# Patient Record
Sex: MALE | Race: WHITE | NOT HISPANIC OR LATINO | Employment: OTHER | URBAN - METROPOLITAN AREA
[De-identification: names, ages, dates, MRNs, and addresses within clinical notes are randomized per-mention and may not be internally consistent; named-entity substitution may affect disease eponyms.]

---

## 2023-08-08 DIAGNOSIS — I25.10 CORONARY ARTERY DISEASE INVOLVING NATIVE CORONARY ARTERY OF NATIVE HEART, UNSPECIFIED WHETHER ANGINA PRESENT: Primary | ICD-10-CM

## 2023-08-09 ENCOUNTER — HOSPITAL ENCOUNTER (INPATIENT)
Facility: HOSPITAL | Age: 73
LOS: 7 days | Discharge: NURSING, REHAB OR LONG-TERM CARE | DRG: 236 | End: 2023-08-17
Attending: THORACIC SURGERY (CARDIOTHORACIC VASCULAR SURGERY) | Admitting: THORACIC SURGERY (CARDIOTHORACIC VASCULAR SURGERY)
Payer: OTHER GOVERNMENT

## 2023-08-09 ENCOUNTER — HOSPITAL ENCOUNTER (OUTPATIENT)
Dept: CARDIOLOGY | Facility: HOSPITAL | Age: 73
Discharge: HOME | End: 2023-08-09
Payer: MEDICARE

## 2023-08-09 DIAGNOSIS — Z00.6 ENCOUNTER FOR EXAMINATION FOR NORMAL COMPARISON AND CONTROL IN CLINICAL RESEARCH PROGRAM: ICD-10-CM

## 2023-08-09 DIAGNOSIS — I25.118 CORONARY ARTERY DISEASE OF NATIVE ARTERY OF NATIVE HEART WITH STABLE ANGINA PECTORIS: Primary | ICD-10-CM

## 2023-08-10 ENCOUNTER — APPOINTMENT (OUTPATIENT)
Dept: CARDIOLOGY | Facility: HOSPITAL | Age: 73
DRG: 236 | End: 2023-08-10
Payer: OTHER GOVERNMENT

## 2023-08-10 ENCOUNTER — APPOINTMENT (OUTPATIENT)
Dept: GENERAL RADIOLOGY | Facility: HOSPITAL | Age: 73
DRG: 236 | End: 2023-08-10
Payer: OTHER GOVERNMENT

## 2023-08-10 ENCOUNTER — APPOINTMENT (OUTPATIENT)
Dept: PULMONOLOGY | Facility: HOSPITAL | Age: 73
DRG: 236 | End: 2023-08-10
Payer: OTHER GOVERNMENT

## 2023-08-10 PROBLEM — I25.10 CORONARY ARTERY DISEASE: Status: ACTIVE | Noted: 2023-08-10

## 2023-08-10 LAB
ABO GROUP BLD: NORMAL
ABO GROUP BLD: NORMAL
ALBUMIN SERPL-MCNC: 4.1 G/DL (ref 3.5–5.2)
ALBUMIN/GLOB SERPL: 1.6 G/DL
ALP SERPL-CCNC: 58 U/L (ref 39–117)
ALT SERPL W P-5'-P-CCNC: 40 U/L (ref 1–41)
AMPHET+METHAMPHET UR QL: NEGATIVE
AMPHETAMINES UR QL: NEGATIVE
ANION GAP SERPL CALCULATED.3IONS-SCNC: 9 MMOL/L (ref 5–15)
APTT PPP: 44.2 SECONDS (ref 60–90)
ASCENDING AORTA: 3 CM
AST SERPL-CCNC: 27 U/L (ref 1–40)
BARBITURATES UR QL SCN: NEGATIVE
BASOPHILS # BLD AUTO: 0.06 10*3/MM3 (ref 0–0.2)
BASOPHILS NFR BLD AUTO: 0.8 % (ref 0–1.5)
BENZODIAZ UR QL SCN: NEGATIVE
BH CV ECHO MEAS - AO MAX PG: 5.2 MMHG
BH CV ECHO MEAS - AO MEAN PG: 2.6 MMHG
BH CV ECHO MEAS - AO ROOT DIAM: 3.3 CM
BH CV ECHO MEAS - AO V2 MAX: 114.5 CM/SEC
BH CV ECHO MEAS - AO V2 VTI: 25.4 CM
BH CV ECHO MEAS - AVA(I,D): 3.2 CM2
BH CV ECHO MEAS - EDV(CUBED): 103.5 ML
BH CV ECHO MEAS - EDV(MOD-SP2): 43.8 ML
BH CV ECHO MEAS - EDV(MOD-SP4): 31.9 ML
BH CV ECHO MEAS - EF(MOD-BP): 62 %
BH CV ECHO MEAS - EF(MOD-SP2): 64 %
BH CV ECHO MEAS - EF(MOD-SP4): 64.8 %
BH CV ECHO MEAS - ESV(CUBED): 52.9 ML
BH CV ECHO MEAS - ESV(MOD-SP2): 15.8 ML
BH CV ECHO MEAS - ESV(MOD-SP4): 11.2 ML
BH CV ECHO MEAS - FS: 20 %
BH CV ECHO MEAS - IVS/LVPW: 1.06 CM
BH CV ECHO MEAS - IVSD: 1.15 CM
BH CV ECHO MEAS - LA DIMENSION: 3.9 CM
BH CV ECHO MEAS - LAT PEAK E' VEL: 10 CM/SEC
BH CV ECHO MEAS - LV DIASTOLIC VOL/BSA (35-75): 15.2 CM2
BH CV ECHO MEAS - LV MASS(C)D: 190.8 GRAMS
BH CV ECHO MEAS - LV MAX PG: 4.2 MMHG
BH CV ECHO MEAS - LV MEAN PG: 2.38 MMHG
BH CV ECHO MEAS - LV SYSTOLIC VOL/BSA (12-30): 5.3 CM2
BH CV ECHO MEAS - LV V1 MAX: 102.8 CM/SEC
BH CV ECHO MEAS - LV V1 VTI: 23 CM
BH CV ECHO MEAS - LVIDD: 4.7 CM
BH CV ECHO MEAS - LVIDS: 3.8 CM
BH CV ECHO MEAS - LVOT AREA: 3.5 CM2
BH CV ECHO MEAS - LVOT DIAM: 2.12 CM
BH CV ECHO MEAS - LVPWD: 1.08 CM
BH CV ECHO MEAS - MED PEAK E' VEL: 5 CM/SEC
BH CV ECHO MEAS - MV A MAX VEL: 47.6 CM/SEC
BH CV ECHO MEAS - MV DEC SLOPE: 178.4 CM/SEC2
BH CV ECHO MEAS - MV DEC TIME: 0.25 MSEC
BH CV ECHO MEAS - MV E MAX VEL: 44.9 CM/SEC
BH CV ECHO MEAS - MV E/A: 0.94
BH CV ECHO MEAS - MV MAX PG: 2.26 MMHG
BH CV ECHO MEAS - MV MEAN PG: 0.85 MMHG
BH CV ECHO MEAS - MV P1/2T: 55 MSEC
BH CV ECHO MEAS - MV V2 VTI: 21 CM
BH CV ECHO MEAS - MVA(VTI): 3.9 CM2
BH CV ECHO MEAS - PA ACC TIME: 0.16 SEC
BH CV ECHO MEAS - PA V2 MAX: 97.6 CM/SEC
BH CV ECHO MEAS - RAP SYSTOLE: 3 MMHG
BH CV ECHO MEAS - RVSP: 18.2 MMHG
BH CV ECHO MEAS - SI(MOD-SP2): 13.4 ML/M2
BH CV ECHO MEAS - SI(MOD-SP4): 9.9 ML/M2
BH CV ECHO MEAS - SV(LVOT): 81.3 ML
BH CV ECHO MEAS - SV(MOD-SP2): 28 ML
BH CV ECHO MEAS - SV(MOD-SP4): 20.7 ML
BH CV ECHO MEAS - TAPSE (>1.6): 2.6 CM
BH CV ECHO MEAS - TR MAX PG: 15.2 MMHG
BH CV ECHO MEAS - TR MAX VEL: 194.8 CM/SEC
BH CV ECHO MEASUREMENTS AVERAGE E/E' RATIO: 5.99
BH CV VAS BP RIGHT ARM: NORMAL MMHG
BH CV XLRA - RV BASE: 3.7 CM
BH CV XLRA - RV LENGTH: 6.5 CM
BH CV XLRA - RV MID: 2.9 CM
BH CV XLRA - TDI S': 15 CM/SEC
BH CV XLRA MEAS LEFT DIST CCA EDV: 13.7 CM/SEC
BH CV XLRA MEAS LEFT DIST CCA PSV: 85.8 CM/SEC
BH CV XLRA MEAS LEFT DIST ICA EDV: 26 CM/SEC
BH CV XLRA MEAS LEFT DIST ICA PSV: 95 CM/SEC
BH CV XLRA MEAS LEFT ICA/CCA RATIO: 1.32
BH CV XLRA MEAS LEFT MID CCA EDV: 17.2 CM/SEC
BH CV XLRA MEAS LEFT MID CCA PSV: 103.3 CM/SEC
BH CV XLRA MEAS LEFT MID ICA EDV: 26 CM/SEC
BH CV XLRA MEAS LEFT MID ICA PSV: 115.3 CM/SEC
BH CV XLRA MEAS LEFT PROX CCA EDV: 16.5 CM/SEC
BH CV XLRA MEAS LEFT PROX CCA PSV: 111.4 CM/SEC
BH CV XLRA MEAS LEFT PROX ECA EDV: 9.7 CM/SEC
BH CV XLRA MEAS LEFT PROX ECA PSV: 168.1 CM/SEC
BH CV XLRA MEAS LEFT PROX ICA EDV: 37.7 CM/SEC
BH CV XLRA MEAS LEFT PROX ICA PSV: 136.2 CM/SEC
BH CV XLRA MEAS LEFT PROX SCLA EDV: 0 CM/SEC
BH CV XLRA MEAS LEFT PROX SCLA PSV: 203.4 CM/SEC
BH CV XLRA MEAS LEFT VERTEBRAL A EDV: 9 CM/SEC
BH CV XLRA MEAS LEFT VERTEBRAL A PSV: 34 CM/SEC
BH CV XLRA MEAS RIGHT DIST CCA EDV: 18.3 CM/SEC
BH CV XLRA MEAS RIGHT DIST CCA PSV: 96.4 CM/SEC
BH CV XLRA MEAS RIGHT DIST ICA EDV: 26 CM/SEC
BH CV XLRA MEAS RIGHT DIST ICA PSV: 65.3 CM/SEC
BH CV XLRA MEAS RIGHT ICA/CCA RATIO: 0.85
BH CV XLRA MEAS RIGHT MID CCA EDV: 16.6 CM/SEC
BH CV XLRA MEAS RIGHT MID CCA PSV: 96.3 CM/SEC
BH CV XLRA MEAS RIGHT MID ICA EDV: 20.3 CM/SEC
BH CV XLRA MEAS RIGHT MID ICA PSV: 59.7 CM/SEC
BH CV XLRA MEAS RIGHT PROX CCA EDV: 14.3 CM/SEC
BH CV XLRA MEAS RIGHT PROX CCA PSV: 119.8 CM/SEC
BH CV XLRA MEAS RIGHT PROX ECA EDV: 10.3 CM/SEC
BH CV XLRA MEAS RIGHT PROX ECA PSV: 141.3 CM/SEC
BH CV XLRA MEAS RIGHT PROX ICA EDV: 21.4 CM/SEC
BH CV XLRA MEAS RIGHT PROX ICA PSV: 82.3 CM/SEC
BH CV XLRA MEAS RIGHT PROX SCLA EDV: 0 CM/SEC
BH CV XLRA MEAS RIGHT PROX SCLA PSV: 212.7 CM/SEC
BH CV XLRA MEAS RIGHT VERTEBRAL A EDV: 16.2 CM/SEC
BH CV XLRA MEAS RIGHT VERTEBRAL A PSV: 43.6 CM/SEC
BILIRUB SERPL-MCNC: 0.3 MG/DL (ref 0–1.2)
BILIRUB UR QL STRIP: NEGATIVE
BLD GP AB SCN SERPL QL: NEGATIVE
BUN SERPL-MCNC: 16 MG/DL (ref 8–23)
BUN/CREAT SERPL: 16.3 (ref 7–25)
BUPRENORPHINE SERPL-MCNC: NEGATIVE NG/ML
CALCIUM SPEC-SCNC: 9.6 MG/DL (ref 8.6–10.5)
CANNABINOIDS SERPL QL: NEGATIVE
CHLORIDE SERPL-SCNC: 102 MMOL/L (ref 98–107)
CHOLEST SERPL-MCNC: 192 MG/DL (ref 0–200)
CLARITY UR: CLEAR
CO2 SERPL-SCNC: 29 MMOL/L (ref 22–29)
COCAINE UR QL: NEGATIVE
COLOR UR: YELLOW
CREAT SERPL-MCNC: 0.98 MG/DL (ref 0.76–1.27)
DEPRECATED RDW RBC AUTO: 41.4 FL (ref 37–54)
EGFRCR SERPLBLD CKD-EPI 2021: 81.9 ML/MIN/1.73
EOSINOPHIL # BLD AUTO: 0.25 10*3/MM3 (ref 0–0.4)
EOSINOPHIL NFR BLD AUTO: 3.4 % (ref 0.3–6.2)
ERYTHROCYTE [DISTWIDTH] IN BLOOD BY AUTOMATED COUNT: 12.6 % (ref 12.3–15.4)
FENTANYL UR-MCNC: NEGATIVE NG/ML
GLOBULIN UR ELPH-MCNC: 2.6 GM/DL
GLUCOSE BLDC GLUCOMTR-MCNC: 119 MG/DL (ref 70–130)
GLUCOSE BLDC GLUCOMTR-MCNC: 146 MG/DL (ref 70–130)
GLUCOSE BLDC GLUCOMTR-MCNC: 162 MG/DL (ref 70–130)
GLUCOSE BLDC GLUCOMTR-MCNC: 172 MG/DL (ref 70–130)
GLUCOSE BLDC GLUCOMTR-MCNC: 183 MG/DL (ref 70–130)
GLUCOSE SERPL-MCNC: 171 MG/DL (ref 65–99)
GLUCOSE UR STRIP-MCNC: NEGATIVE MG/DL
HBA1C MFR BLD: 7.1 % (ref 4.8–5.6)
HCT VFR BLD AUTO: 39.7 % (ref 37.5–51)
HDLC SERPL-MCNC: 29 MG/DL (ref 40–60)
HGB BLD-MCNC: 14.1 G/DL (ref 13–17.7)
HGB UR QL STRIP.AUTO: NEGATIVE
IMM GRANULOCYTES # BLD AUTO: 0.04 10*3/MM3 (ref 0–0.05)
IMM GRANULOCYTES NFR BLD AUTO: 0.5 % (ref 0–0.5)
INR PPP: 1.06 (ref 0.89–1.12)
IVRT: 101 MSEC
KETONES UR QL STRIP: NEGATIVE
LDLC SERPL CALC-MCNC: 86 MG/DL (ref 0–100)
LDLC/HDLC SERPL: 2.35 {RATIO}
LEFT ATRIUM VOLUME INDEX: 18.8 ML/M2
LEUKOCYTE ESTERASE UR QL STRIP.AUTO: NEGATIVE
LV EF 2D ECHO EST: 70 %
LYMPHOCYTES # BLD AUTO: 2.45 10*3/MM3 (ref 0.7–3.1)
LYMPHOCYTES NFR BLD AUTO: 33.3 % (ref 19.6–45.3)
MCH RBC QN AUTO: 32.2 PG (ref 26.6–33)
MCHC RBC AUTO-ENTMCNC: 35.5 G/DL (ref 31.5–35.7)
MCV RBC AUTO: 90.6 FL (ref 79–97)
METHADONE UR QL SCN: NEGATIVE
MONOCYTES # BLD AUTO: 0.42 10*3/MM3 (ref 0.1–0.9)
MONOCYTES NFR BLD AUTO: 5.7 % (ref 5–12)
NEUTROPHILS NFR BLD AUTO: 4.13 10*3/MM3 (ref 1.7–7)
NEUTROPHILS NFR BLD AUTO: 56.3 % (ref 42.7–76)
NITRITE UR QL STRIP: NEGATIVE
NRBC BLD AUTO-RTO: 0 /100 WBC (ref 0–0.2)
OPIATES UR QL: POSITIVE
OXYCODONE UR QL SCN: NEGATIVE
PA ADP PRP-ACNC: 227 PRU
PCP UR QL SCN: NEGATIVE
PH UR STRIP.AUTO: 6 [PH] (ref 5–8)
PLATELET # BLD AUTO: 184 10*3/MM3 (ref 140–450)
PMV BLD AUTO: 9.7 FL (ref 6–12)
POTASSIUM SERPL-SCNC: 4.1 MMOL/L (ref 3.5–5.2)
PROPOXYPH UR QL: NEGATIVE
PROT SERPL-MCNC: 6.7 G/DL (ref 6–8.5)
PROT UR QL STRIP: NEGATIVE
PROTHROMBIN TIME: 13.9 SECONDS (ref 12.2–14.5)
QT INTERVAL: 454 MS
QTC INTERVAL: 426 MS
RBC # BLD AUTO: 4.38 10*6/MM3 (ref 4.14–5.8)
RH BLD: POSITIVE
RH BLD: POSITIVE
RIGHT ARM BP: NORMAL MMHG
SODIUM SERPL-SCNC: 140 MMOL/L (ref 136–145)
SP GR UR STRIP: 1.01 (ref 1–1.03)
T&S EXPIRATION DATE: NORMAL
TRICYCLICS UR QL SCN: NEGATIVE
TRIGL SERPL-MCNC: 474 MG/DL (ref 0–150)
UFH PPP CHRO-ACNC: 0.1 IU/ML (ref 0.3–0.7)
UFH PPP CHRO-ACNC: 0.18 IU/ML (ref 0.3–0.7)
UFH PPP CHRO-ACNC: 0.22 IU/ML (ref 0.3–0.7)
UROBILINOGEN UR QL STRIP: NORMAL
VLDLC SERPL-MCNC: 77 MG/DL (ref 5–40)
WBC NRBC COR # BLD: 7.35 10*3/MM3 (ref 3.4–10.8)

## 2023-08-10 PROCEDURE — 81003 URINALYSIS AUTO W/O SCOPE: CPT | Performed by: PHYSICIAN ASSISTANT

## 2023-08-10 PROCEDURE — 80061 LIPID PANEL: CPT | Performed by: PHYSICIAN ASSISTANT

## 2023-08-10 PROCEDURE — 93306 TTE W/DOPPLER COMPLETE: CPT

## 2023-08-10 PROCEDURE — 93880 EXTRACRANIAL BILAT STUDY: CPT

## 2023-08-10 PROCEDURE — 83036 HEMOGLOBIN GLYCOSYLATED A1C: CPT | Performed by: PHYSICIAN ASSISTANT

## 2023-08-10 PROCEDURE — 86923 COMPATIBILITY TEST ELECTRIC: CPT

## 2023-08-10 PROCEDURE — 86850 RBC ANTIBODY SCREEN: CPT | Performed by: PHYSICIAN ASSISTANT

## 2023-08-10 PROCEDURE — 85610 PROTHROMBIN TIME: CPT | Performed by: PHYSICIAN ASSISTANT

## 2023-08-10 PROCEDURE — 80307 DRUG TEST PRSMV CHEM ANLYZR: CPT | Performed by: PHYSICIAN ASSISTANT

## 2023-08-10 PROCEDURE — 99222 1ST HOSP IP/OBS MODERATE 55: CPT

## 2023-08-10 PROCEDURE — 25010000002 HEPARIN (PORCINE) 25000-0.45 UT/250ML-% SOLUTION: Performed by: PHYSICIAN ASSISTANT

## 2023-08-10 PROCEDURE — 85520 HEPARIN ASSAY: CPT | Performed by: PHYSICIAN ASSISTANT

## 2023-08-10 PROCEDURE — 85520 HEPARIN ASSAY: CPT

## 2023-08-10 PROCEDURE — 86901 BLOOD TYPING SEROLOGIC RH(D): CPT

## 2023-08-10 PROCEDURE — 85025 COMPLETE CBC W/AUTO DIFF WBC: CPT | Performed by: PHYSICIAN ASSISTANT

## 2023-08-10 PROCEDURE — 25010000002 NITROGLYCERIN 200 MCG/ML SOLUTION: Performed by: PHYSICIAN ASSISTANT

## 2023-08-10 PROCEDURE — 80053 COMPREHEN METABOLIC PANEL: CPT | Performed by: PHYSICIAN ASSISTANT

## 2023-08-10 PROCEDURE — 94010 BREATHING CAPACITY TEST: CPT | Performed by: INTERNAL MEDICINE

## 2023-08-10 PROCEDURE — 25010000002 HEPARIN (PORCINE) 25000-0.45 UT/250ML-% SOLUTION

## 2023-08-10 PROCEDURE — 93010 ELECTROCARDIOGRAM REPORT: CPT | Performed by: STUDENT IN AN ORGANIZED HEALTH CARE EDUCATION/TRAINING PROGRAM

## 2023-08-10 PROCEDURE — 86901 BLOOD TYPING SEROLOGIC RH(D): CPT | Performed by: PHYSICIAN ASSISTANT

## 2023-08-10 PROCEDURE — 86900 BLOOD TYPING SEROLOGIC ABO: CPT | Performed by: PHYSICIAN ASSISTANT

## 2023-08-10 PROCEDURE — 86900 BLOOD TYPING SEROLOGIC ABO: CPT

## 2023-08-10 PROCEDURE — 94010 BREATHING CAPACITY TEST: CPT

## 2023-08-10 PROCEDURE — 93005 ELECTROCARDIOGRAM TRACING: CPT | Performed by: PHYSICIAN ASSISTANT

## 2023-08-10 PROCEDURE — 93306 TTE W/DOPPLER COMPLETE: CPT | Performed by: INTERNAL MEDICINE

## 2023-08-10 PROCEDURE — 82948 REAGENT STRIP/BLOOD GLUCOSE: CPT

## 2023-08-10 PROCEDURE — 93880 EXTRACRANIAL BILAT STUDY: CPT | Performed by: INTERNAL MEDICINE

## 2023-08-10 PROCEDURE — 85730 THROMBOPLASTIN TIME PARTIAL: CPT | Performed by: PHYSICIAN ASSISTANT

## 2023-08-10 PROCEDURE — 85576 BLOOD PLATELET AGGREGATION: CPT | Performed by: PHYSICIAN ASSISTANT

## 2023-08-10 PROCEDURE — 71045 X-RAY EXAM CHEST 1 VIEW: CPT

## 2023-08-10 RX ORDER — ALLOPURINOL 100 MG/1
100 TABLET ORAL DAILY
COMMUNITY

## 2023-08-10 RX ORDER — SODIUM CHLORIDE 0.9 % (FLUSH) 0.9 %
10 SYRINGE (ML) INJECTION AS NEEDED
Status: DISCONTINUED | OUTPATIENT
Start: 2023-08-10 | End: 2023-08-17 | Stop reason: HOSPADM

## 2023-08-10 RX ORDER — SODIUM CHLORIDE 0.9 % (FLUSH) 0.9 %
10 SYRINGE (ML) INJECTION EVERY 12 HOURS SCHEDULED
Status: DISCONTINUED | OUTPATIENT
Start: 2023-08-10 | End: 2023-08-14

## 2023-08-10 RX ORDER — OMEPRAZOLE 20 MG/1
20 CAPSULE, DELAYED RELEASE ORAL DAILY
COMMUNITY
End: 2023-08-17 | Stop reason: HOSPADM

## 2023-08-10 RX ORDER — ASPIRIN 81 MG/1
81 TABLET ORAL DAILY
COMMUNITY

## 2023-08-10 RX ORDER — HEPARIN SODIUM 10000 [USP'U]/100ML
15 INJECTION, SOLUTION INTRAVENOUS
Status: DISPENSED | OUTPATIENT
Start: 2023-08-10 | End: 2023-08-10

## 2023-08-10 RX ORDER — GABAPENTIN 600 MG/1
600 TABLET ORAL 2 TIMES DAILY
COMMUNITY

## 2023-08-10 RX ORDER — NITROGLYCERIN 20 MG/100ML
10-50 INJECTION INTRAVENOUS
Status: DISCONTINUED | OUTPATIENT
Start: 2023-08-10 | End: 2023-08-14

## 2023-08-10 RX ORDER — HYDROCHLOROTHIAZIDE 12.5 MG/1
37.5 CAPSULE, GELATIN COATED ORAL DAILY
Status: DISCONTINUED | OUTPATIENT
Start: 2023-08-10 | End: 2023-08-11

## 2023-08-10 RX ORDER — TRIAMTERENE AND HYDROCHLOROTHIAZIDE 37.5; 25 MG/1; MG/1
1 CAPSULE ORAL EVERY MORNING
COMMUNITY
End: 2023-08-17 | Stop reason: HOSPADM

## 2023-08-10 RX ORDER — IPRATROPIUM BROMIDE AND ALBUTEROL SULFATE 2.5; .5 MG/3ML; MG/3ML
3 SOLUTION RESPIRATORY (INHALATION) EVERY 4 HOURS PRN
Status: DISCONTINUED | OUTPATIENT
Start: 2023-08-10 | End: 2023-08-17 | Stop reason: HOSPADM

## 2023-08-10 RX ORDER — SODIUM CHLORIDE 9 MG/ML
40 INJECTION, SOLUTION INTRAVENOUS AS NEEDED
Status: DISCONTINUED | OUTPATIENT
Start: 2023-08-10 | End: 2023-08-17 | Stop reason: HOSPADM

## 2023-08-10 RX ORDER — HYDROCHLOROTHIAZIDE 12.5 MG/1
37.5 CAPSULE, GELATIN COATED ORAL EVERY MORNING
Status: ON HOLD | COMMUNITY
End: 2023-08-10

## 2023-08-10 RX ORDER — CARVEDILOL 12.5 MG/1
12.5 TABLET ORAL 2 TIMES DAILY WITH MEALS
Status: DISCONTINUED | OUTPATIENT
Start: 2023-08-10 | End: 2023-08-11

## 2023-08-10 RX ORDER — CARVEDILOL 25 MG/1
25 TABLET ORAL 2 TIMES DAILY WITH MEALS
Status: ON HOLD | COMMUNITY
End: 2023-08-16 | Stop reason: SDUPTHER

## 2023-08-10 RX ORDER — CHLORHEXIDINE GLUCONATE 0.12 MG/ML
15 RINSE ORAL 2 TIMES DAILY
Status: COMPLETED | OUTPATIENT
Start: 2023-08-10 | End: 2023-08-11

## 2023-08-10 RX ORDER — GABAPENTIN 300 MG/1
300 CAPSULE ORAL EVERY 12 HOURS SCHEDULED
Status: DISCONTINUED | OUTPATIENT
Start: 2023-08-10 | End: 2023-08-17 | Stop reason: HOSPADM

## 2023-08-10 RX ORDER — PANTOPRAZOLE SODIUM 40 MG/1
40 TABLET, DELAYED RELEASE ORAL
Status: DISCONTINUED | OUTPATIENT
Start: 2023-08-10 | End: 2023-08-17 | Stop reason: HOSPADM

## 2023-08-10 RX ORDER — ASPIRIN 81 MG/1
81 TABLET ORAL ONCE
Status: COMPLETED | OUTPATIENT
Start: 2023-08-10 | End: 2023-08-10

## 2023-08-10 RX ORDER — CHLORAL HYDRATE 500 MG
2000 CAPSULE ORAL 2 TIMES DAILY WITH MEALS
COMMUNITY

## 2023-08-10 RX ORDER — CHLORHEXIDINE GLUCONATE 500 MG/1
CLOTH TOPICAL EVERY 12 HOURS PRN
Status: DISCONTINUED | OUTPATIENT
Start: 2023-08-10 | End: 2023-08-11

## 2023-08-10 RX ORDER — ISOSORBIDE MONONITRATE 30 MG/1
30 TABLET, EXTENDED RELEASE ORAL DAILY
COMMUNITY
End: 2023-08-17 | Stop reason: HOSPADM

## 2023-08-10 RX ORDER — ALLOPURINOL 100 MG/1
100 TABLET ORAL DAILY
Status: DISCONTINUED | OUTPATIENT
Start: 2023-08-10 | End: 2023-08-11

## 2023-08-10 RX ADMIN — ASPIRIN 81 MG: 81 TABLET, COATED ORAL at 21:09

## 2023-08-10 RX ADMIN — ALLOPURINOL 100 MG: 100 TABLET ORAL at 08:27

## 2023-08-10 RX ADMIN — GABAPENTIN 300 MG: 300 CAPSULE ORAL at 08:27

## 2023-08-10 RX ADMIN — PANTOPRAZOLE SODIUM 40 MG: 40 TABLET, DELAYED RELEASE ORAL at 05:42

## 2023-08-10 RX ADMIN — HYDROCHLOROTHIAZIDE 37.5 MG: 12.5 CAPSULE ORAL at 08:27

## 2023-08-10 RX ADMIN — Medication 10 ML: at 08:27

## 2023-08-10 RX ADMIN — HEPARIN SODIUM 11 UNITS/KG/HR: 10000 INJECTION, SOLUTION INTRAVENOUS at 01:26

## 2023-08-10 RX ADMIN — GABAPENTIN 300 MG: 300 CAPSULE ORAL at 21:09

## 2023-08-10 RX ADMIN — CHLORHEXIDINE GLUCONATE 15 ML: 1.2 SOLUTION ORAL at 21:09

## 2023-08-10 RX ADMIN — CARVEDILOL 12.5 MG: 12.5 TABLET, FILM COATED ORAL at 17:02

## 2023-08-10 RX ADMIN — MUPIROCIN 1 APPLICATION: 20 OINTMENT TOPICAL at 17:02

## 2023-08-10 RX ADMIN — CARVEDILOL 12.5 MG: 12.5 TABLET, FILM COATED ORAL at 08:27

## 2023-08-10 RX ADMIN — HEPARIN SODIUM 15 UNITS/KG/HR: 10000 INJECTION, SOLUTION INTRAVENOUS at 22:36

## 2023-08-10 RX ADMIN — NITROGLYCERIN 50 MCG/MIN: 20 INJECTION INTRAVENOUS at 01:29

## 2023-08-10 RX ADMIN — Medication 10 ML: at 01:31

## 2023-08-10 NOTE — PROGRESS NOTES
HEPARIN INFUSION  Dutch Arellano is a  73 y.o. male receiving heparin infusion.     Therapy for (VTE/Cardiac): Cardiac  Patient Weight: 89.5 kg  Initial Bolus (Y/N): No  Any Bolus (Y/N): No        Signs or Symptoms of Bleeding: No    Cardiac or Other (Not VTE)   Initial Bolus: 60 units/kg (Max 4,000 units)  Initial rate: 12 units/kg/hr (Max 1,000 units/hr)   Anti Xa Infusion Hold time Change infusion Dose (Units/kg/hr) Next Anti Xa or aPTT Level Due   < 0.11 None Increase by  3 Units/kg/hr 6 hours   0.11- 0.19 None Increase by  2 Units/kg/hr 6 hours   0.2 - 0.29 None Increase by  1 Units/kg/hr 6 hours   0.3 - 0.5 None No Change 6 hours (after 2 consecutive levels in range check qAM)   0.51 - 0.6 None Decrease by  1 Units/kg/hr 6 hours   0.61 - 0.8 30 Minutes Decrease by  2 Units/kg/hr 6 hours   0.81 - 1 60 Minutes Decrease by  3 Units/kg/hr 6 hours   >1 Hold  After Anti Xa less than 0.5 decrease previous rate by  4 Units/kg/hr  Every 2 hours until Anti Xa  less than 0.5 then when infusion restarts in 6 hours     Recommend Xa every 6 hours.              Date   Time   Anti-Xa Current Rate (Unit/kg/hr) Bolus   (Units) Rate Change   (Unit/kg/hr) New Rate (Unit/kg/hr) Next   Anti-Xa Comments  Pump Check Daily   8/10 0104 -- New  -- +11 11 0800 D/w JOHN Solis       --           --           --           --           --           --           --           --           --           --           --           --           --           --           --           --           --           --           --           --         Christy Sharif [facility]  8/10/2023  01:12 EDT

## 2023-08-10 NOTE — H&P
[facility] Cardiothoracic Surgery                            History & Physical    Name:  Dutch Arellano  MRN Number:  [id number]  Date of Admission:  2023    PCP: Provider, No Known    History of Present Illness:    Dutch Arellano is a 73 y.o. male with a history of former smoker (does vape), type 2 diabetes-A1c 7.1, liposarcoma s/p right AKA, HTN, CAD s/p 5 stents, and family history of heart disease in his father who  of MI at age 79.  Patient initially presented to [facility] on  for chest pain. He underwent cardiac catheterization which revealed multivessel coronary artery disease and was transferred to our facility on  for higher level of care and surgical revascularization.  He reports a 2-week history of chest pain and fatigue.  He denies any history of chest radiation, vein stripping or lasering procedures, or kidney disease.  He is currently chest pain-free.  On heparin and nitroglycerin drips.    Review of Systems:  Review of Systems   Constitutional: Positive for malaise/fatigue.   HENT: Negative.     Eyes: Negative.    Cardiovascular:  Positive for chest pain.   Respiratory: Negative.     Endocrine: Negative.    Hematologic/Lymphatic: Negative.    Skin: Negative.    Musculoskeletal: Negative.    Gastrointestinal: Negative.    Genitourinary: Negative.    Neurological: Negative.    Psychiatric/Behavioral: Negative.     Allergic/Immunologic: Negative.      Past Medical History:  History reviewed. No pertinent past medical history.    Past Surgical History:  History reviewed. No pertinent surgical history.    Family History:  History reviewed. No pertinent family history.    Social History:    Social History     Socioeconomic History    Marital status:    Tobacco Use    Smoking status: Never    Smokeless tobacco: Never   Vaping Use    Vaping Use: Every day   Substance and Sexual Activity    Alcohol use: Never    Drug use: Never       Allergies:  No Known  Allergies    Medications:      Current Facility-Administered Medications:     allopurinol (ZYLOPRIM) tablet 100 mg, 100 mg, Oral, Daily, Danica Honeycutt PA-C    aspirin EC tablet 81 mg, 81 mg, Oral, Once, Danica Honeycutt PA-C    carvedilol (COREG) tablet 12.5 mg, 12.5 mg, Oral, BID With Meals, Danica Honeycutt PA-C    chlorhexidine (PERIDEX) 0.12 % solution 15 mL, 15 mL, Mouth/Throat, BID, Danica Honeycutt PA-C    Chlorhexidine Gluconate Cloth 2 % pads, , Topical, Q12H PRN, Danica Honeycutt PA-C    gabapentin (NEURONTIN) capsule 300 mg, 300 mg, Oral, Q12H, Spenser Louise MD    heparin 31386 units/250 mL (100 units/mL) in 0.45 % NaCl infusion, 11 Units/kg/hr, Intravenous, Titrated, Danica Honeycutt PA-C, Last Rate: 9.84 mL/hr at 08/10/23 0126, 11 Units/kg/hr at 08/10/23 0126    hydroCHLOROthiazide (MICROZIDE) capsule 37.5 mg, 37.5 mg, Oral, Daily, Danica Honeycutt PA-C    ipratropium-albuterol (DUO-NEB) nebulizer solution 3 mL, 3 mL, Nebulization, Q4H PRN, Danica Honeycutt PA-C    mupirocin (BACTROBAN) 2 % nasal ointment 1 application , 1 application , Each Nare, BID, Danica Honeycutt PA-C    nitroglycerin (TRIDIL) 200 mcg/ml infusion, 10-50 mcg/min, Intravenous, Titrated, Danica Honeycutt PA-C, Last Rate: 15 mL/hr at 08/10/23 0129, 50 mcg/min at 08/10/23 0129    pantoprazole (PROTONIX) EC tablet 40 mg, 40 mg, Oral, Q AM, Danica Honeycutt PA-C, 40 mg at 08/10/23 0542    Pharmacy to Dose Heparin, , Does not apply, Continuous PRN, Danica Honeycutt PA-C    sodium chloride 0.9 % flush 10 mL, 10 mL, Intravenous, Q12H, [place]inka, Danica, PA-C, 10 mL at 08/10/23 0131    sodium chloride 0.9 % flush 10 mL, 10 mL, Intravenous, PRN, Yinka, Danica, PA-C    sodium chloride 0.9 % infusion 40 mL, 40 mL, Intravenous, PRN, [place], Danica, PA-C    Physical Exam:  Vital Signs:    Vitals:    08/10/23 0003 08/10/23 0300 08/10/23 0500 08/10/23 0544   BP: 143/90   103/64   BP Location: Left arm   Left arm   Patient Position: Lying   " Lying   Pulse: 68 59 58 69   Resp: 18   18   Temp: 98.2 øF (36.8 øC)   98.1 øF (36.7 øC)   TempSrc: Oral   Oral   SpO2: 92% 93% 91% 92%   Weight: 89.5 kg (197 lb 6.4 oz)      Height: 180.3 cm (71\")        Body mass index is 27.53 kg/mý.     Physical Exam  Constitutional:       Appearance: Normal appearance.   HENT:      Head: Normocephalic.      Mouth/Throat:      Pharynx: Oropharynx is clear.   Eyes:      Pupils: Pupils are equal, round, and reactive to light.   Cardiovascular:      Rate and Rhythm: Normal rate.      Pulses: Normal pulses.   Pulmonary:      Effort: Pulmonary effort is normal.   Abdominal:      General: Bowel sounds are normal.   Musculoskeletal:      Cervical back: Normal range of motion.      Right Lower Extremity: Right leg is amputated above knee.   Skin:     General: Skin is warm.   Neurological:      General: No focal deficit present.      Mental Status: He is alert and oriented to person, place, and time.   Psychiatric:         Mood and Affect: Mood normal.         Behavior: Behavior normal.       Labs/Imaging/Procedures:   Results from last 7 days   Lab Units 08/10/23  0104   SODIUM mmol/L 140   POTASSIUM mmol/L 4.1   CHLORIDE mmol/L 102   CO2 mmol/L 29.0   BUN mg/dL 16   CREATININE mg/dL 0.98   CALCIUM mg/dL 9.6   BILIRUBIN mg/dL 0.3   ALK PHOS U/L 58   ALT (SGPT) U/L 40   AST (SGOT) U/L 27   GLUCOSE mg/dL 171*         Results from last 7 days   Lab Units 08/10/23  0104   WBC 10*3/mm3 7.35   HEMOGLOBIN g/dL 14.1   HEMATOCRIT % 39.7   PLATELETS 10*3/mm3 184     Results from last 7 days   Lab Units 08/10/23  0104   INR  1.06   APTT seconds 44.2*         Results from last 7 days   Lab Units 08/10/23  0104   CHOLESTEROL mg/dL 192   TRIGLYCERIDES mg/dL 474*   HDL CHOL mg/dL 29*   LDL CHOL mg/dL 86     No radiology results for the last 30 days.       Assessment:    Coronary artery disease      Plan:  Preop for CABG tomorrow with Dr. Louise  NNiyapanne-marie after midnight    Joyce Paz, " JANIE  08/10/23  07:01 EDT

## 2023-08-10 NOTE — PLAN OF CARE
Goal Outcome Evaluation:  Plan of Care Reviewed With: patient, spouse, daughter, grandchild(junior)        Progress: no change          Patient's heparin gtt continued @ 15u/kg/hr and nitro gtt continued @ 25mcg/min currently.Pt did have brief episode of chest pain, 7/10 after sitting on edge of bed for breakfast. Pt's SBP did increase to 190s but now currently maintaining in low 100s-130s. No chest pain or dyspnea currently. NSR to Sinus rober (low 50s) on monitor with ST depression & T wave inversion. Consents obtained. Family at bedside and updated.

## 2023-08-10 NOTE — PLAN OF CARE
Goal Outcome Evaluation:  Plan of Care Reviewed With: patient           Outcome Evaluation: New admit from OSH  foe CABG on 8.11.23. Pt. alert and conscious, breathes on room , 2 L applied once pt fell asleep. Heparin drip and Nitro drip progressing. No chest pain. vs stable. NSR on tele. To be seen by CT sx today. NPO status maintained.

## 2023-08-10 NOTE — CASE MANAGEMENT/SOCIAL WORK
Discharge Planning Assessment  [facility]     Patient Name: Dutch Arellano  MRN: [id number]  Today's Date: 8/10/2023    Admit Date: 8/9/2023    Plan: Home with spouse   Discharge Needs Assessment       Row Name 08/10/23 0823       Living Environment    People in Home spouse    Name(s) of People in Home Julianna Arellano (spouse) [phone]    Current Living Arrangements home    Potentially Unsafe Housing Conditions none    Primary Care Provided by self    Provides Primary Care For no one    Family Caregiver if Needed spouse    Able to Return to Prior Arrangements yes       Resource/Environmental Concerns    Resource/Environmental Concerns none    Transportation Concerns none       Transition Planning    Patient/Family Anticipates Transition to home with family    Patient/Family Anticipated Services at Transition none    Transportation Anticipated family or friend will provide       Discharge Needs Assessment    Readmission Within the Last 30 Days no previous admission in last 30 days    Equipment Currently Used at Home walker, rolling;prosthesis    Concerns to be Addressed denies needs/concerns at this time    Anticipated Changes Related to Illness none    Equipment Needed After Discharge none                   Discharge Plan       Row Name 08/10/23 0824       Plan    Plan Home with spouse    Patient/Family in Agreement with Plan yes    Plan Comments Spoke with patient at bedside. Lives with Julianna Arellano (spouse) [phone] in [facility].. Is independent with ADL's. No problems with Medicare/VA or medications. Uses a prothesis and rolling walker at home. Has no advanced directives. PSP [facility] Dr. Ying [facility]. Plan is home with spouse. CM will continue to follow.    Final Discharge Disposition Code 01 - home or self-care                  Continued Care and Services - Admitted Since 8/9/2023    Coordination has not been started for this encounter.          Demographic Summary       Row Name 08/10/23 0823        General Information    Admission Type inpatient    Arrived From hospital    Referral Source admission list    Reason for Consult discharge planning    Preferred Language English       Contact Information    Permission Granted to Share Info With     Contact Information Obtained for                    Functional Status       Row Name 08/10/23 0823       Functional Status    Usual Activity Tolerance good    Current Activity Tolerance good       Functional Status, IADL    Medications independent    Meal Preparation independent    Housekeeping independent    Laundry independent    Shopping independent       Mental Status    General Appearance WDL WDL       Mental Status Summary    Recent Changes in Mental Status/Cognitive Functioning no changes       Employment/    Employment Status retired                   Psychosocial    No documentation.                  Abuse/Neglect    No documentation.                  Legal    No documentation.                  Substance Abuse    No documentation.                  Patient Forms    No documentation.                     Corey Dominique RN

## 2023-08-11 ENCOUNTER — ANESTHESIA EVENT (OUTPATIENT)
Dept: PERIOP | Facility: HOSPITAL | Age: 73
DRG: 236 | End: 2023-08-11
Payer: OTHER GOVERNMENT

## 2023-08-11 ENCOUNTER — APPOINTMENT (OUTPATIENT)
Dept: GENERAL RADIOLOGY | Facility: HOSPITAL | Age: 73
DRG: 236 | End: 2023-08-11
Payer: OTHER GOVERNMENT

## 2023-08-11 ENCOUNTER — ANESTHESIA (OUTPATIENT)
Dept: PERIOP | Facility: HOSPITAL | Age: 73
DRG: 236 | End: 2023-08-11
Payer: OTHER GOVERNMENT

## 2023-08-11 ENCOUNTER — ANESTHESIA EVENT CONVERTED (OUTPATIENT)
Dept: ANESTHESIOLOGY | Facility: HOSPITAL | Age: 73
DRG: 236 | End: 2023-08-11
Payer: OTHER GOVERNMENT

## 2023-08-11 ENCOUNTER — ANCILLARY PROCEDURE (OUTPATIENT)
Dept: PERIOP | Facility: HOSPITAL | Age: 73
DRG: 236 | End: 2023-08-11
Payer: OTHER GOVERNMENT

## 2023-08-11 PROBLEM — E11.9 T2DM (TYPE 2 DIABETES MELLITUS): Chronic | Status: ACTIVE | Noted: 2023-08-11

## 2023-08-11 PROBLEM — I10 HTN (HYPERTENSION): Status: ACTIVE | Noted: 2023-08-11

## 2023-08-11 PROBLEM — Z72.0 VAPES NICOTINE CONTAINING SUBSTANCE: Status: ACTIVE | Noted: 2023-08-11

## 2023-08-11 PROBLEM — Z72.0 VAPES NICOTINE CONTAINING SUBSTANCE: Chronic | Status: ACTIVE | Noted: 2023-08-11

## 2023-08-11 PROBLEM — K21.9 GERD (GASTROESOPHAGEAL REFLUX DISEASE): Status: ACTIVE | Noted: 2023-08-11

## 2023-08-11 PROBLEM — K21.9 GERD (GASTROESOPHAGEAL REFLUX DISEASE): Chronic | Status: ACTIVE | Noted: 2023-08-11

## 2023-08-11 PROBLEM — E11.9 T2DM (TYPE 2 DIABETES MELLITUS): Status: ACTIVE | Noted: 2023-08-11

## 2023-08-11 PROBLEM — Z87.891 FORMER SMOKER: Status: ACTIVE | Noted: 2023-08-11

## 2023-08-11 PROBLEM — I10 HTN (HYPERTENSION): Chronic | Status: ACTIVE | Noted: 2023-08-11

## 2023-08-11 LAB
ACT BLD: 131 SECONDS (ref 82–152)
ACT BLD: 137 SECONDS (ref 82–152)
ACT BLD: 582 SECONDS (ref 82–152)
ALBUMIN SERPL-MCNC: 4.5 G/DL (ref 3.5–5.2)
ALBUMIN SERPL-MCNC: 4.7 G/DL (ref 3.5–5.2)
ANION GAP SERPL CALCULATED.3IONS-SCNC: 13 MMOL/L (ref 5–15)
ANION GAP SERPL CALCULATED.3IONS-SCNC: 13 MMOL/L (ref 5–15)
ANION GAP SERPL CALCULATED.3IONS-SCNC: 14 MMOL/L (ref 5–15)
APTT PPP: 31.6 SECONDS (ref 22–39)
ARTERIAL PATENCY WRIST A: ABNORMAL
ARTERIAL PATENCY WRIST A: ABNORMAL
ATMOSPHERIC PRESS: ABNORMAL MM[HG]
ATMOSPHERIC PRESS: ABNORMAL MM[HG]
BASE EXCESS BLDA CALC-SCNC: -1.4 MMOL/L (ref 0–2)
BASE EXCESS BLDA CALC-SCNC: 0.6 MMOL/L (ref 0–2)
BDY SITE: ABNORMAL
BDY SITE: ABNORMAL
BODY TEMPERATURE: 37 C
BODY TEMPERATURE: 37 C
BUN SERPL-MCNC: 12 MG/DL (ref 8–23)
BUN SERPL-MCNC: 13 MG/DL (ref 8–23)
BUN SERPL-MCNC: 14 MG/DL (ref 8–23)
BUN/CREAT SERPL: 11.2 (ref 7–25)
BUN/CREAT SERPL: 13.1 (ref 7–25)
BUN/CREAT SERPL: 13.3 (ref 7–25)
CA-I SERPL ISE-MCNC: 1.38 MMOL/L (ref 1.12–1.32)
CALCIUM SPEC-SCNC: 9.3 MG/DL (ref 8.6–10.5)
CALCIUM SPEC-SCNC: 9.6 MG/DL (ref 8.6–10.5)
CALCIUM SPEC-SCNC: 9.8 MG/DL (ref 8.6–10.5)
CHLORIDE SERPL-SCNC: 101 MMOL/L (ref 98–107)
CHLORIDE SERPL-SCNC: 103 MMOL/L (ref 98–107)
CHLORIDE SERPL-SCNC: 104 MMOL/L (ref 98–107)
CO2 BLDA-SCNC: 25.3 MMOL/L (ref 22–33)
CO2 BLDA-SCNC: 26.4 MMOL/L (ref 22–33)
CO2 SERPL-SCNC: 23 MMOL/L (ref 22–29)
CO2 SERPL-SCNC: 25 MMOL/L (ref 22–29)
CO2 SERPL-SCNC: 26 MMOL/L (ref 22–29)
COHGB MFR BLD: 1.5 % (ref 0–2)
COHGB MFR BLD: 1.5 % (ref 0–2)
CREAT SERPL-MCNC: 0.98 MG/DL (ref 0.76–1.27)
CREAT SERPL-MCNC: 1.07 MG/DL (ref 0.76–1.27)
CREAT SERPL-MCNC: 1.07 MG/DL (ref 0.76–1.27)
DEPRECATED RDW RBC AUTO: 40.8 FL (ref 37–54)
DEPRECATED RDW RBC AUTO: 41.9 FL (ref 37–54)
DEPRECATED RDW RBC AUTO: 43.7 FL (ref 37–54)
EGFRCR SERPLBLD CKD-EPI 2021: 73.7 ML/MIN/1.73
EGFRCR SERPLBLD CKD-EPI 2021: 73.7 ML/MIN/1.73
EGFRCR SERPLBLD CKD-EPI 2021: 81.9 ML/MIN/1.73
EPAP: 0
EPAP: 0
ERYTHROCYTE [DISTWIDTH] IN BLOOD BY AUTOMATED COUNT: 12.5 % (ref 12.3–15.4)
ERYTHROCYTE [DISTWIDTH] IN BLOOD BY AUTOMATED COUNT: 12.9 % (ref 12.3–15.4)
ERYTHROCYTE [DISTWIDTH] IN BLOOD BY AUTOMATED COUNT: 13.2 % (ref 12.3–15.4)
GLUCOSE BLDC GLUCOMTR-MCNC: 131 MG/DL (ref 70–130)
GLUCOSE BLDC GLUCOMTR-MCNC: 131 MG/DL (ref 70–130)
GLUCOSE BLDC GLUCOMTR-MCNC: 143 MG/DL (ref 70–130)
GLUCOSE BLDC GLUCOMTR-MCNC: 148 MG/DL (ref 70–130)
GLUCOSE BLDC GLUCOMTR-MCNC: 148 MG/DL (ref 70–130)
GLUCOSE BLDC GLUCOMTR-MCNC: 153 MG/DL (ref 70–130)
GLUCOSE BLDC GLUCOMTR-MCNC: 171 MG/DL (ref 70–130)
GLUCOSE BLDC GLUCOMTR-MCNC: 172 MG/DL (ref 70–130)
GLUCOSE BLDC GLUCOMTR-MCNC: 172 MG/DL (ref 70–130)
GLUCOSE BLDC GLUCOMTR-MCNC: 175 MG/DL (ref 70–130)
GLUCOSE BLDC GLUCOMTR-MCNC: 179 MG/DL (ref 70–130)
GLUCOSE BLDC GLUCOMTR-MCNC: 183 MG/DL (ref 70–130)
GLUCOSE BLDC GLUCOMTR-MCNC: 190 MG/DL (ref 70–130)
GLUCOSE BLDC GLUCOMTR-MCNC: 195 MG/DL (ref 70–130)
GLUCOSE SERPL-MCNC: 136 MG/DL (ref 65–99)
GLUCOSE SERPL-MCNC: 142 MG/DL (ref 65–99)
GLUCOSE SERPL-MCNC: 146 MG/DL (ref 65–99)
HCO3 BLDA-SCNC: 24 MMOL/L (ref 20–26)
HCO3 BLDA-SCNC: 25.2 MMOL/L (ref 20–26)
HCT VFR BLD AUTO: 30.1 % (ref 37.5–51)
HCT VFR BLD AUTO: 31.4 % (ref 37.5–51)
HCT VFR BLD AUTO: 41.8 % (ref 37.5–51)
HCT VFR BLD CALC: 32 % (ref 38–51)
HCT VFR BLD CALC: 35.7 % (ref 38–51)
HGB BLD-MCNC: 10.7 G/DL (ref 13–17.7)
HGB BLD-MCNC: 11 G/DL (ref 13–17.7)
HGB BLD-MCNC: 14.8 G/DL (ref 13–17.7)
HGB BLDA-MCNC: 10.4 G/DL (ref 13.5–17.5)
HGB BLDA-MCNC: 11.7 G/DL (ref 13.5–17.5)
INHALED O2 CONCENTRATION: 100 %
INHALED O2 CONCENTRATION: 40 %
INR PPP: 1.5 (ref 0.89–1.12)
IPAP: 0
IPAP: 0
MAGNESIUM SERPL-MCNC: 2.3 MG/DL (ref 1.6–2.4)
MAGNESIUM SERPL-MCNC: 2.4 MG/DL (ref 1.6–2.4)
MCH RBC QN AUTO: 32 PG (ref 26.6–33)
MCH RBC QN AUTO: 32.2 PG (ref 26.6–33)
MCH RBC QN AUTO: 32.7 PG (ref 26.6–33)
MCHC RBC AUTO-ENTMCNC: 35 G/DL (ref 31.5–35.7)
MCHC RBC AUTO-ENTMCNC: 35.4 G/DL (ref 31.5–35.7)
MCHC RBC AUTO-ENTMCNC: 35.5 G/DL (ref 31.5–35.7)
MCV RBC AUTO: 91.1 FL (ref 79–97)
MCV RBC AUTO: 91.3 FL (ref 79–97)
MCV RBC AUTO: 92 FL (ref 79–97)
METHGB BLD QL: 0.6 % (ref 0–1.5)
METHGB BLD QL: 0.9 % (ref 0–1.5)
MODALITY: ABNORMAL
MODALITY: ABNORMAL
NOTE: ABNORMAL
NOTE: ABNORMAL
OXYHGB MFR BLDV: 95.6 % (ref 94–99)
OXYHGB MFR BLDV: 96 % (ref 94–99)
PA ADP PRP-ACNC: 194 PRU
PAW @ PEAK INSP FLOW SETTING VENT: 0 CMH2O
PAW @ PEAK INSP FLOW SETTING VENT: 0 CMH2O
PCO2 BLDA: 39.4 MM HG (ref 35–45)
PCO2 BLDA: 42.6 MM HG (ref 35–45)
PCO2 TEMP ADJ BLD: 39.4 MM HG (ref 35–48)
PCO2 TEMP ADJ BLD: 42.6 MM HG (ref 35–48)
PH BLDA: 7.36 PH UNITS (ref 7.35–7.45)
PH BLDA: 7.42 PH UNITS (ref 7.35–7.45)
PH, TEMP CORRECTED: 7.36 PH UNITS
PH, TEMP CORRECTED: 7.42 PH UNITS
PHOSPHATE SERPL-MCNC: 2.2 MG/DL (ref 2.5–4.5)
PHOSPHATE SERPL-MCNC: 2.3 MG/DL (ref 2.5–4.5)
PLATELET # BLD AUTO: 141 10*3/MM3 (ref 140–450)
PLATELET # BLD AUTO: 160 10*3/MM3 (ref 140–450)
PLATELET # BLD AUTO: 184 10*3/MM3 (ref 140–450)
PMV BLD AUTO: 9.6 FL (ref 6–12)
PMV BLD AUTO: 9.7 FL (ref 6–12)
PMV BLD AUTO: 9.9 FL (ref 6–12)
PO2 BLDA: 91.4 MM HG (ref 83–108)
PO2 BLDA: 93.6 MM HG (ref 83–108)
PO2 TEMP ADJ BLD: 91.4 MM HG (ref 83–108)
PO2 TEMP ADJ BLD: 93.6 MM HG (ref 83–108)
POTASSIUM SERPL-SCNC: 3.7 MMOL/L (ref 3.5–5.2)
POTASSIUM SERPL-SCNC: 3.8 MMOL/L (ref 3.5–5.2)
POTASSIUM SERPL-SCNC: 3.8 MMOL/L (ref 3.5–5.2)
POTASSIUM SERPL-SCNC: 4 MMOL/L (ref 3.5–5.2)
PROTHROMBIN TIME: 18.3 SECONDS (ref 12.2–14.5)
QT INTERVAL: 314 MS
QT INTERVAL: 442 MS
QTC INTERVAL: 373 MS
QTC INTERVAL: 497 MS
RBC # BLD AUTO: 3.27 10*6/MM3 (ref 4.14–5.8)
RBC # BLD AUTO: 3.44 10*6/MM3 (ref 4.14–5.8)
RBC # BLD AUTO: 4.59 10*6/MM3 (ref 4.14–5.8)
SODIUM SERPL-SCNC: 140 MMOL/L (ref 136–145)
SODIUM SERPL-SCNC: 140 MMOL/L (ref 136–145)
SODIUM SERPL-SCNC: 142 MMOL/L (ref 136–145)
TOTAL RATE: 0 BREATHS/MINUTE
TOTAL RATE: 0 BREATHS/MINUTE
WBC NRBC COR # BLD: 10.2 10*3/MM3 (ref 3.4–10.8)
WBC NRBC COR # BLD: 12.1 10*3/MM3 (ref 3.4–10.8)
WBC NRBC COR # BLD: 6.48 10*3/MM3 (ref 3.4–10.8)

## 2023-08-11 PROCEDURE — 71045 X-RAY EXAM CHEST 1 VIEW: CPT

## 2023-08-11 PROCEDURE — 94002 VENT MGMT INPAT INIT DAY: CPT

## 2023-08-11 PROCEDURE — 25010000002 ACETAMINOPHEN 10 MG/ML SOLUTION: Performed by: PHYSICIAN ASSISTANT

## 2023-08-11 PROCEDURE — 25010000002 ALBUMIN HUMAN 5% PER 50 ML: Performed by: PHYSICIAN ASSISTANT

## 2023-08-11 PROCEDURE — 85014 HEMATOCRIT: CPT

## 2023-08-11 PROCEDURE — 99024 POSTOP FOLLOW-UP VISIT: CPT | Performed by: THORACIC SURGERY (CARDIOTHORACIC VASCULAR SURGERY)

## 2023-08-11 PROCEDURE — 82947 ASSAY GLUCOSE BLOOD QUANT: CPT

## 2023-08-11 PROCEDURE — P9041 ALBUMIN (HUMAN),5%, 50ML: HCPCS | Performed by: ANESTHESIOLOGY

## 2023-08-11 PROCEDURE — 25010000002 NITROGLYCERIN 100-5 MCG/ML-% SOLUTION: Performed by: ANESTHESIOLOGY

## 2023-08-11 PROCEDURE — 82803 BLOOD GASES ANY COMBINATION: CPT

## 2023-08-11 PROCEDURE — 84132 ASSAY OF SERUM POTASSIUM: CPT | Performed by: THORACIC SURGERY (CARDIOTHORACIC VASCULAR SURGERY)

## 2023-08-11 PROCEDURE — 93010 ELECTROCARDIOGRAM REPORT: CPT | Performed by: INTERNAL MEDICINE

## 2023-08-11 PROCEDURE — P9041 ALBUMIN (HUMAN),5%, 50ML: HCPCS

## 2023-08-11 PROCEDURE — 93005 ELECTROCARDIOGRAM TRACING: CPT | Performed by: PHYSICIAN ASSISTANT

## 2023-08-11 PROCEDURE — 85027 COMPLETE CBC AUTOMATED: CPT | Performed by: PHYSICIAN ASSISTANT

## 2023-08-11 PROCEDURE — 82330 ASSAY OF CALCIUM: CPT

## 2023-08-11 PROCEDURE — 33519 CABG ARTERY-VEIN THREE: CPT | Performed by: THORACIC SURGERY (CARDIOTHORACIC VASCULAR SURGERY)

## 2023-08-11 PROCEDURE — 25010000002 FENTANYL CITRATE (PF) 50 MCG/ML SOLUTION: Performed by: ANESTHESIOLOGY

## 2023-08-11 PROCEDURE — 80069 RENAL FUNCTION PANEL: CPT | Performed by: PHYSICIAN ASSISTANT

## 2023-08-11 PROCEDURE — 25810000003 DEXTROSE 5 % WITH KCL 20 MEQ 20 MEQ/L SOLUTION: Performed by: PHYSICIAN ASSISTANT

## 2023-08-11 PROCEDURE — 83050 HGB METHEMOGLOBIN QUAN: CPT

## 2023-08-11 PROCEDURE — 82375 ASSAY CARBOXYHB QUANT: CPT

## 2023-08-11 PROCEDURE — 85347 COAGULATION TIME ACTIVATED: CPT

## 2023-08-11 PROCEDURE — 36430 TRANSFUSION BLD/BLD COMPNT: CPT

## 2023-08-11 PROCEDURE — 93318 ECHO TRANSESOPHAGEAL INTRAOP: CPT | Performed by: ANESTHESIOLOGY

## 2023-08-11 PROCEDURE — 25010000002 PROPOFOL 10 MG/ML EMULSION: Performed by: PHYSICIAN ASSISTANT

## 2023-08-11 PROCEDURE — 25010000002 NITROGLYCERIN 200 MCG/ML SOLUTION: Performed by: PHYSICIAN ASSISTANT

## 2023-08-11 PROCEDURE — 25010000002 HEPARIN (PORCINE) PER 1000 UNITS: Performed by: THORACIC SURGERY (CARDIOTHORACIC VASCULAR SURGERY)

## 2023-08-11 PROCEDURE — 25010000002 ACETAMINOPHEN 10 MG/ML SOLUTION: Performed by: ANESTHESIOLOGY

## 2023-08-11 PROCEDURE — 5A1221Z PERFORMANCE OF CARDIAC OUTPUT, CONTINUOUS: ICD-10-PCS | Performed by: THORACIC SURGERY (CARDIOTHORACIC VASCULAR SURGERY)

## 2023-08-11 PROCEDURE — 82805 BLOOD GASES W/O2 SATURATION: CPT

## 2023-08-11 PROCEDURE — P9016 RBC LEUKOCYTES REDUCED: HCPCS

## 2023-08-11 PROCEDURE — P9041 ALBUMIN (HUMAN),5%, 50ML: HCPCS | Performed by: PHYSICIAN ASSISTANT

## 2023-08-11 PROCEDURE — 84295 ASSAY OF SERUM SODIUM: CPT

## 2023-08-11 PROCEDURE — 33268 EXCL LAA OPN OTH PX ANY METH: CPT | Performed by: PHYSICIAN ASSISTANT

## 2023-08-11 PROCEDURE — 85730 THROMBOPLASTIN TIME PARTIAL: CPT | Performed by: PHYSICIAN ASSISTANT

## 2023-08-11 PROCEDURE — 25010000002 PROPOFOL 10 MG/ML EMULSION: Performed by: ANESTHESIOLOGY

## 2023-08-11 PROCEDURE — 25010000002 VANCOMYCIN 1 G RECONSTITUTED SOLUTION 1 EACH VIAL: Performed by: THORACIC SURGERY (CARDIOTHORACIC VASCULAR SURGERY)

## 2023-08-11 PROCEDURE — 80048 BASIC METABOLIC PNL TOTAL CA: CPT | Performed by: PHYSICIAN ASSISTANT

## 2023-08-11 PROCEDURE — 0 POTASSIUM CHLORIDE PER 2 MEQ: Performed by: THORACIC SURGERY (CARDIOTHORACIC VASCULAR SURGERY)

## 2023-08-11 PROCEDURE — 94799 UNLISTED PULMONARY SVC/PX: CPT

## 2023-08-11 PROCEDURE — 33268 EXCL LAA OPN OTH PX ANY METH: CPT | Performed by: THORACIC SURGERY (CARDIOTHORACIC VASCULAR SURGERY)

## 2023-08-11 PROCEDURE — 02100Z9 BYPASS CORONARY ARTERY, ONE ARTERY FROM LEFT INTERNAL MAMMARY, OPEN APPROACH: ICD-10-PCS | Performed by: THORACIC SURGERY (CARDIOTHORACIC VASCULAR SURGERY)

## 2023-08-11 PROCEDURE — P9035 PLATELET PHERES LEUKOREDUCED: HCPCS

## 2023-08-11 PROCEDURE — 25010000002 ALBUMIN HUMAN 5% PER 50 ML: Performed by: ANESTHESIOLOGY

## 2023-08-11 PROCEDURE — 84132 ASSAY OF SERUM POTASSIUM: CPT

## 2023-08-11 PROCEDURE — 99232 SBSQ HOSP IP/OBS MODERATE 35: CPT | Performed by: PHYSICIAN ASSISTANT

## 2023-08-11 PROCEDURE — 06BQ4ZZ EXCISION OF LEFT SAPHENOUS VEIN, PERCUTANEOUS ENDOSCOPIC APPROACH: ICD-10-PCS | Performed by: THORACIC SURGERY (CARDIOTHORACIC VASCULAR SURGERY)

## 2023-08-11 PROCEDURE — 25010000002 MORPHINE PER 10 MG: Performed by: THORACIC SURGERY (CARDIOTHORACIC VASCULAR SURGERY)

## 2023-08-11 PROCEDURE — 86900 BLOOD TYPING SEROLOGIC ABO: CPT

## 2023-08-11 PROCEDURE — P9100 PATHOGEN TEST FOR PLATELETS: HCPCS

## 2023-08-11 PROCEDURE — 02L70CK OCCLUSION OF LEFT ATRIAL APPENDAGE WITH EXTRALUMINAL DEVICE, OPEN APPROACH: ICD-10-PCS | Performed by: THORACIC SURGERY (CARDIOTHORACIC VASCULAR SURGERY)

## 2023-08-11 PROCEDURE — 33508 ENDOSCOPIC VEIN HARVEST: CPT | Performed by: PHYSICIAN ASSISTANT

## 2023-08-11 PROCEDURE — 99233 SBSQ HOSP IP/OBS HIGH 50: CPT | Performed by: INTERNAL MEDICINE

## 2023-08-11 PROCEDURE — 25010000002 CEFAZOLIN IN DEXTROSE 2000 MG/ 100 ML SOLUTION: Performed by: THORACIC SURGERY (CARDIOTHORACIC VASCULAR SURGERY)

## 2023-08-11 PROCEDURE — C1751 CATH, INF, PER/CENT/MIDLINE: HCPCS | Performed by: ANESTHESIOLOGY

## 2023-08-11 PROCEDURE — 33519 CABG ARTERY-VEIN THREE: CPT | Performed by: PHYSICIAN ASSISTANT

## 2023-08-11 PROCEDURE — C1894 INTRO/SHEATH, NON-LASER: HCPCS | Performed by: THORACIC SURGERY (CARDIOTHORACIC VASCULAR SURGERY)

## 2023-08-11 PROCEDURE — 25010000002 MORPHINE PER 10 MG: Performed by: PHYSICIAN ASSISTANT

## 2023-08-11 PROCEDURE — 25010000002 HEPARIN (PORCINE) PER 1000 UNITS: Performed by: ANESTHESIOLOGY

## 2023-08-11 PROCEDURE — 25010000002 PAPAVERINE PER 60 MG: Performed by: THORACIC SURGERY (CARDIOTHORACIC VASCULAR SURGERY)

## 2023-08-11 PROCEDURE — 25010000002 CEFAZOLIN PER 500 MG: Performed by: THORACIC SURGERY (CARDIOTHORACIC VASCULAR SURGERY)

## 2023-08-11 PROCEDURE — 33533 CABG ARTERIAL SINGLE: CPT | Performed by: THORACIC SURGERY (CARDIOTHORACIC VASCULAR SURGERY)

## 2023-08-11 PROCEDURE — 83735 ASSAY OF MAGNESIUM: CPT | Performed by: PHYSICIAN ASSISTANT

## 2023-08-11 PROCEDURE — 021209W BYPASS CORONARY ARTERY, THREE ARTERIES FROM AORTA WITH AUTOLOGOUS VENOUS TISSUE, OPEN APPROACH: ICD-10-PCS | Performed by: THORACIC SURGERY (CARDIOTHORACIC VASCULAR SURGERY)

## 2023-08-11 PROCEDURE — 33508 ENDOSCOPIC VEIN HARVEST: CPT | Performed by: THORACIC SURGERY (CARDIOTHORACIC VASCULAR SURGERY)

## 2023-08-11 PROCEDURE — 25010000002 GENTAMICIN PER 80 MG: Performed by: THORACIC SURGERY (CARDIOTHORACIC VASCULAR SURGERY)

## 2023-08-11 PROCEDURE — 85610 PROTHROMBIN TIME: CPT | Performed by: PHYSICIAN ASSISTANT

## 2023-08-11 PROCEDURE — 25010000002 PROTAMINE SULFATE PER 10 MG

## 2023-08-11 PROCEDURE — 33533 CABG ARTERIAL SINGLE: CPT | Performed by: PHYSICIAN ASSISTANT

## 2023-08-11 PROCEDURE — 85576 BLOOD PLATELET AGGREGATION: CPT | Performed by: PHYSICIAN ASSISTANT

## 2023-08-11 PROCEDURE — 82330 ASSAY OF CALCIUM: CPT | Performed by: PHYSICIAN ASSISTANT

## 2023-08-11 PROCEDURE — 25010000002 ALBUMIN HUMAN 5% PER 50 ML

## 2023-08-11 PROCEDURE — 82948 REAGENT STRIP/BLOOD GLUCOSE: CPT

## 2023-08-11 PROCEDURE — 25010000002 PROTAMINE SULFATE PER 10 MG: Performed by: ANESTHESIOLOGY

## 2023-08-11 DEVICE — TEMP PACING WIRE
Type: IMPLANTABLE DEVICE | Site: CHEST | Status: FUNCTIONAL
Brand: MYO/WIRE

## 2023-08-11 DEVICE — LIGACLIP MCA MULTIPLE CLIP APPLIERS, 20 SMALL CLIPS
Type: IMPLANTABLE DEVICE | Site: CHEST | Status: FUNCTIONAL
Brand: LIGACLIP

## 2023-08-11 DEVICE — ATRICLIP® FLEX GILINOV-COSGROVE LAA EXCLUSION SYSTEM 35
Type: IMPLANTABLE DEVICE | Site: HEART | Status: FUNCTIONAL
Brand: ATRICLIP™ LAA EXCLUSION SYSTEM

## 2023-08-11 DEVICE — SEALANT HEMO TACHOSIL FIBRIN PTCH 9.5X4.8CM: Type: IMPLANTABLE DEVICE | Site: CHEST | Status: FUNCTIONAL

## 2023-08-11 DEVICE — IMPLANTABLE DEVICE
Type: IMPLANTABLE DEVICE | Site: CHEST | Status: FUNCTIONAL
Brand: SURGIFOAM® ABSORBABLE GELATIN POWDER

## 2023-08-11 DEVICE — PROXIMATE RELOADABLE LINEAR STAPLER, 30MM
Type: IMPLANTABLE DEVICE | Site: CHEST | Status: FUNCTIONAL
Brand: PROXIMATE

## 2023-08-11 RX ORDER — ROCURONIUM BROMIDE 10 MG/ML
INJECTION, SOLUTION INTRAVENOUS AS NEEDED
Status: DISCONTINUED | OUTPATIENT
Start: 2023-08-11 | End: 2023-08-11 | Stop reason: SURG

## 2023-08-11 RX ORDER — NITROGLYCERIN 0.4 MG/1
0.4 TABLET SUBLINGUAL
Status: DISCONTINUED | OUTPATIENT
Start: 2023-08-11 | End: 2023-08-17 | Stop reason: HOSPADM

## 2023-08-11 RX ORDER — ATORVASTATIN CALCIUM 40 MG/1
40 TABLET, FILM COATED ORAL NIGHTLY
Status: DISCONTINUED | OUTPATIENT
Start: 2023-08-11 | End: 2023-08-17 | Stop reason: HOSPADM

## 2023-08-11 RX ORDER — ALBUMIN, HUMAN INJ 5% 5 %
250 SOLUTION INTRAVENOUS AS NEEDED
Status: DISCONTINUED | OUTPATIENT
Start: 2023-08-11 | End: 2023-08-17 | Stop reason: HOSPADM

## 2023-08-11 RX ORDER — NICARDIPINE HYDROCHLORIDE 2.5 MG/ML
INJECTION INTRAVENOUS AS NEEDED
Status: DISCONTINUED | OUTPATIENT
Start: 2023-08-11 | End: 2023-08-11 | Stop reason: SURG

## 2023-08-11 RX ORDER — NICOTINE POLACRILEX 4 MG
15 LOZENGE BUCCAL
Status: DISCONTINUED | OUTPATIENT
Start: 2023-08-11 | End: 2023-08-12

## 2023-08-11 RX ORDER — ASPIRIN 325 MG
325 TABLET, DELAYED RELEASE (ENTERIC COATED) ORAL DAILY
Status: DISCONTINUED | OUTPATIENT
Start: 2023-08-12 | End: 2023-08-14

## 2023-08-11 RX ORDER — METOPROLOL TARTRATE 5 MG/5ML
2.5 INJECTION INTRAVENOUS EVERY 6 HOURS SCHEDULED
Status: DISPENSED | OUTPATIENT
Start: 2023-08-11 | End: 2023-08-12

## 2023-08-11 RX ORDER — ALBUMIN, HUMAN INJ 5% 5 %
SOLUTION INTRAVENOUS
Status: COMPLETED
Start: 2023-08-11 | End: 2023-08-11

## 2023-08-11 RX ORDER — NALOXONE HCL 0.4 MG/ML
0.4 VIAL (ML) INJECTION
Status: DISCONTINUED | OUTPATIENT
Start: 2023-08-11 | End: 2023-08-16

## 2023-08-11 RX ORDER — NALOXONE HCL 0.4 MG/ML
0.4 VIAL (ML) INJECTION
Status: DISCONTINUED | OUTPATIENT
Start: 2023-08-11 | End: 2023-08-14

## 2023-08-11 RX ORDER — CEFAZOLIN SODIUM 2 G/100ML
2 INJECTION, SOLUTION INTRAVENOUS EVERY 8 HOURS
Status: COMPLETED | OUTPATIENT
Start: 2023-08-11 | End: 2023-08-13

## 2023-08-11 RX ORDER — MORPHINE SULFATE 2 MG/ML
2 INJECTION, SOLUTION INTRAMUSCULAR; INTRAVENOUS
Status: DISCONTINUED | OUTPATIENT
Start: 2023-08-11 | End: 2023-08-14

## 2023-08-11 RX ORDER — IBUPROFEN 600 MG/1
1 TABLET ORAL
Status: DISCONTINUED | OUTPATIENT
Start: 2023-08-11 | End: 2023-08-12

## 2023-08-11 RX ORDER — LABETALOL HYDROCHLORIDE 5 MG/ML
10 INJECTION, SOLUTION INTRAVENOUS
Status: DISPENSED | OUTPATIENT
Start: 2023-08-11 | End: 2023-08-11

## 2023-08-11 RX ORDER — CHLORHEXIDINE GLUCONATE 0.12 MG/ML
15 RINSE ORAL EVERY 12 HOURS SCHEDULED
Status: DISPENSED | OUTPATIENT
Start: 2023-08-11 | End: 2023-08-13

## 2023-08-11 RX ORDER — LABETALOL HYDROCHLORIDE 5 MG/ML
10 INJECTION, SOLUTION INTRAVENOUS
Status: ACTIVE | OUTPATIENT
Start: 2023-08-11 | End: 2023-08-11

## 2023-08-11 RX ORDER — MORPHINE SULFATE 2 MG/ML
2 INJECTION, SOLUTION INTRAMUSCULAR; INTRAVENOUS
Status: DISCONTINUED | OUTPATIENT
Start: 2023-08-11 | End: 2023-08-11

## 2023-08-11 RX ORDER — DOPAMINE HYDROCHLORIDE 160 MG/100ML
2-20 INJECTION, SOLUTION INTRAVENOUS CONTINUOUS PRN
Status: DISCONTINUED | OUTPATIENT
Start: 2023-08-11 | End: 2023-08-14

## 2023-08-11 RX ORDER — DEXTROSE MONOHYDRATE 25 G/50ML
10-50 INJECTION, SOLUTION INTRAVENOUS
Status: DISCONTINUED | OUTPATIENT
Start: 2023-08-11 | End: 2023-08-12

## 2023-08-11 RX ORDER — POTASSIUM CHLORIDE 29.8 MG/ML
20 INJECTION INTRAVENOUS ONCE
Status: COMPLETED | OUTPATIENT
Start: 2023-08-11 | End: 2023-08-11

## 2023-08-11 RX ORDER — ALBUMIN, HUMAN INJ 5% 5 %
SOLUTION INTRAVENOUS CONTINUOUS PRN
Status: DISCONTINUED | OUTPATIENT
Start: 2023-08-11 | End: 2023-08-11 | Stop reason: SURG

## 2023-08-11 RX ORDER — THROMBIN HUMAN AND FIBRINOGEN 2; 5.5 [USP'U]/1; MG/1
PATCH TOPICAL AS NEEDED
Status: DISCONTINUED | OUTPATIENT
Start: 2023-08-11 | End: 2023-08-11 | Stop reason: HOSPADM

## 2023-08-11 RX ORDER — ASPIRIN 325 MG
325 TABLET ORAL ONCE
Status: COMPLETED | OUTPATIENT
Start: 2023-08-11 | End: 2023-08-11

## 2023-08-11 RX ORDER — ALBUTEROL SULFATE 2.5 MG/3ML
2.5 SOLUTION RESPIRATORY (INHALATION) EVERY 4 HOURS PRN
Status: ACTIVE | OUTPATIENT
Start: 2023-08-11 | End: 2023-08-12

## 2023-08-11 RX ORDER — POTASSIUM CHLORIDE, DEXTROSE MONOHYDRATE 150; 5 MG/100ML; G/100ML
30 INJECTION, SOLUTION INTRAVENOUS CONTINUOUS
Status: DISCONTINUED | OUTPATIENT
Start: 2023-08-11 | End: 2023-08-14

## 2023-08-11 RX ORDER — MORPHINE SULFATE 2 MG/ML
2 INJECTION, SOLUTION INTRAMUSCULAR; INTRAVENOUS
Status: DISCONTINUED | OUTPATIENT
Start: 2023-08-11 | End: 2023-08-13

## 2023-08-11 RX ORDER — NOREPINEPHRINE BITARTRATE 0.03 MG/ML
.02-.3 INJECTION, SOLUTION INTRAVENOUS CONTINUOUS PRN
Status: DISCONTINUED | OUTPATIENT
Start: 2023-08-11 | End: 2023-08-14

## 2023-08-11 RX ORDER — OXYCODONE HYDROCHLORIDE 10 MG/1
10 TABLET ORAL EVERY 4 HOURS PRN
Status: DISCONTINUED | OUTPATIENT
Start: 2023-08-11 | End: 2023-08-16

## 2023-08-11 RX ORDER — PROTAMINE SULFATE 10 MG/ML
INJECTION, SOLUTION INTRAVENOUS AS NEEDED
Status: DISCONTINUED | OUTPATIENT
Start: 2023-08-11 | End: 2023-08-11 | Stop reason: SURG

## 2023-08-11 RX ORDER — ACETAMINOPHEN 325 MG/1
650 TABLET ORAL EVERY 8 HOURS SCHEDULED
Status: DISCONTINUED | OUTPATIENT
Start: 2023-08-12 | End: 2023-08-17 | Stop reason: HOSPADM

## 2023-08-11 RX ORDER — CEFAZOLIN SODIUM 2 G/100ML
2000 INJECTION, SOLUTION INTRAVENOUS ONCE
Status: COMPLETED | OUTPATIENT
Start: 2023-08-11 | End: 2023-08-11

## 2023-08-11 RX ORDER — ACETAMINOPHEN 10 MG/ML
INJECTION, SOLUTION INTRAVENOUS AS NEEDED
Status: DISCONTINUED | OUTPATIENT
Start: 2023-08-11 | End: 2023-08-11 | Stop reason: SURG

## 2023-08-11 RX ORDER — HEPARIN SODIUM 1000 [USP'U]/ML
INJECTION, SOLUTION INTRAVENOUS; SUBCUTANEOUS AS NEEDED
Status: DISCONTINUED | OUTPATIENT
Start: 2023-08-11 | End: 2023-08-11 | Stop reason: SURG

## 2023-08-11 RX ORDER — CARVEDILOL 12.5 MG/1
12.5 TABLET ORAL 2 TIMES DAILY WITH MEALS
Status: DISCONTINUED | OUTPATIENT
Start: 2023-08-12 | End: 2023-08-17 | Stop reason: HOSPADM

## 2023-08-11 RX ORDER — FENTANYL CITRATE 50 UG/ML
25 INJECTION, SOLUTION INTRAMUSCULAR; INTRAVENOUS
Status: DISCONTINUED | OUTPATIENT
Start: 2023-08-11 | End: 2023-08-12

## 2023-08-11 RX ORDER — SODIUM CHLORIDE 9 MG/ML
INJECTION, SOLUTION INTRAVENOUS AS NEEDED
Status: DISCONTINUED | OUTPATIENT
Start: 2023-08-11 | End: 2023-08-11 | Stop reason: HOSPADM

## 2023-08-11 RX ORDER — ACETAMINOPHEN 10 MG/ML
1000 INJECTION, SOLUTION INTRAVENOUS ONCE
Status: COMPLETED | OUTPATIENT
Start: 2023-08-11 | End: 2023-08-11

## 2023-08-11 RX ORDER — AMOXICILLIN 250 MG
2 CAPSULE ORAL 2 TIMES DAILY
Status: DISCONTINUED | OUTPATIENT
Start: 2023-08-11 | End: 2023-08-16

## 2023-08-11 RX ORDER — EPHEDRINE SULFATE 50 MG/ML
INJECTION, SOLUTION INTRAVENOUS AS NEEDED
Status: DISCONTINUED | OUTPATIENT
Start: 2023-08-11 | End: 2023-08-11 | Stop reason: SURG

## 2023-08-11 RX ORDER — MEPERIDINE HYDROCHLORIDE 50 MG/ML
25 INJECTION INTRAMUSCULAR; INTRAVENOUS; SUBCUTANEOUS EVERY 4 HOURS PRN
Status: ACTIVE | OUTPATIENT
Start: 2023-08-11 | End: 2023-08-11

## 2023-08-11 RX ORDER — LIDOCAINE HYDROCHLORIDE 20 MG/ML
INJECTION, SOLUTION INFILTRATION; PERINEURAL AS NEEDED
Status: DISCONTINUED | OUTPATIENT
Start: 2023-08-11 | End: 2023-08-11 | Stop reason: SURG

## 2023-08-11 RX ORDER — CEFAZOLIN SODIUM 2 G/100ML
2 INJECTION, SOLUTION INTRAVENOUS EVERY 8 HOURS
Status: DISCONTINUED | OUTPATIENT
Start: 2023-08-11 | End: 2023-08-11 | Stop reason: SDUPTHER

## 2023-08-11 RX ORDER — AMINOCAPROIC ACID 250 MG/ML
INJECTION, SOLUTION INTRAVENOUS AS NEEDED
Status: DISCONTINUED | OUTPATIENT
Start: 2023-08-11 | End: 2023-08-11 | Stop reason: SURG

## 2023-08-11 RX ORDER — NITROGLYCERIN 20 MG/100ML
5-200 INJECTION INTRAVENOUS CONTINUOUS PRN
Status: DISCONTINUED | OUTPATIENT
Start: 2023-08-11 | End: 2023-08-14

## 2023-08-11 RX ORDER — PROTAMINE SULFATE 10 MG/ML
50 INJECTION, SOLUTION INTRAVENOUS ONCE
Status: COMPLETED | OUTPATIENT
Start: 2023-08-11 | End: 2023-08-11

## 2023-08-11 RX ORDER — ONDANSETRON 2 MG/ML
4 INJECTION INTRAMUSCULAR; INTRAVENOUS EVERY 6 HOURS PRN
Status: DISCONTINUED | OUTPATIENT
Start: 2023-08-11 | End: 2023-08-17 | Stop reason: HOSPADM

## 2023-08-11 RX ORDER — NITROGLYCERIN 10 MG/100ML
INJECTION INTRAVENOUS CONTINUOUS PRN
Status: DISCONTINUED | OUTPATIENT
Start: 2023-08-11 | End: 2023-08-11 | Stop reason: SURG

## 2023-08-11 RX ORDER — ETOMIDATE 2 MG/ML
INJECTION INTRAVENOUS AS NEEDED
Status: DISCONTINUED | OUTPATIENT
Start: 2023-08-11 | End: 2023-08-11 | Stop reason: SURG

## 2023-08-11 RX ORDER — FENTANYL CITRATE 50 UG/ML
INJECTION, SOLUTION INTRAMUSCULAR; INTRAVENOUS AS NEEDED
Status: DISCONTINUED | OUTPATIENT
Start: 2023-08-11 | End: 2023-08-11 | Stop reason: SURG

## 2023-08-11 RX ORDER — PAPAVERINE HYDROCHLORIDE 30 MG/ML
INJECTION INTRAMUSCULAR; INTRAVENOUS AS NEEDED
Status: DISCONTINUED | OUTPATIENT
Start: 2023-08-11 | End: 2023-08-11 | Stop reason: HOSPADM

## 2023-08-11 RX ORDER — DOBUTAMINE HYDROCHLORIDE 100 MG/100ML
2-20 INJECTION INTRAVENOUS CONTINUOUS PRN
Status: DISCONTINUED | OUTPATIENT
Start: 2023-08-11 | End: 2023-08-14

## 2023-08-11 RX ORDER — HYDROCODONE BITARTRATE AND ACETAMINOPHEN 7.5; 325 MG/1; MG/1
1 TABLET ORAL EVERY 4 HOURS PRN
Status: DISCONTINUED | OUTPATIENT
Start: 2023-08-11 | End: 2023-08-17 | Stop reason: HOSPADM

## 2023-08-11 RX ORDER — PROTAMINE SULFATE 10 MG/ML
INJECTION, SOLUTION INTRAVENOUS
Status: COMPLETED
Start: 2023-08-11 | End: 2023-08-11

## 2023-08-11 RX ORDER — DEXMEDETOMIDINE HYDROCHLORIDE 4 UG/ML
.2-1.5 INJECTION, SOLUTION INTRAVENOUS CONTINUOUS PRN
Status: DISCONTINUED | OUTPATIENT
Start: 2023-08-11 | End: 2023-08-14

## 2023-08-11 RX ADMIN — ROCURONIUM BROMIDE 40 MG: 10 SOLUTION INTRAVENOUS at 08:50

## 2023-08-11 RX ADMIN — ALBUMIN (HUMAN) 250 ML: 12.5 INJECTION, SOLUTION INTRAVENOUS at 14:15

## 2023-08-11 RX ADMIN — EPHEDRINE SULFATE 10 MG: 50 INJECTION INTRAVENOUS at 07:55

## 2023-08-11 RX ADMIN — CEFAZOLIN SODIUM 2 G: 2 INJECTION, SOLUTION INTRAVENOUS at 07:20

## 2023-08-11 RX ADMIN — ACETAMINOPHEN 1000 MG: 10 INJECTION, SOLUTION INTRAVENOUS at 10:16

## 2023-08-11 RX ADMIN — ACETAMINOPHEN 1000 MG: 10 INJECTION INTRAVENOUS at 17:04

## 2023-08-11 RX ADMIN — OXYCODONE HYDROCHLORIDE 10 MG: 10 TABLET ORAL at 13:56

## 2023-08-11 RX ADMIN — NICARDIPINE HYDROCHLORIDE 1 MG: 25 INJECTION, SOLUTION INTRAVENOUS at 08:10

## 2023-08-11 RX ADMIN — PROPOFOL 25 MCG/KG/MIN: 10 INJECTION, EMULSION INTRAVENOUS at 10:40

## 2023-08-11 RX ADMIN — Medication 2 G: at 17:05

## 2023-08-11 RX ADMIN — NICARDIPINE HYDROCHLORIDE 1 MG: 25 INJECTION, SOLUTION INTRAVENOUS at 08:15

## 2023-08-11 RX ADMIN — FENTANYL CITRATE 250 MCG: 50 INJECTION, SOLUTION INTRAMUSCULAR; INTRAVENOUS at 08:01

## 2023-08-11 RX ADMIN — FENTANYL CITRATE 250 MCG: 50 INJECTION, SOLUTION INTRAMUSCULAR; INTRAVENOUS at 11:00

## 2023-08-11 RX ADMIN — ASPIRIN 325 MG: 325 TABLET ORAL at 13:43

## 2023-08-11 RX ADMIN — ETOMIDATE 16 MG: 20 INJECTION, SOLUTION INTRAVENOUS at 07:08

## 2023-08-11 RX ADMIN — PROPOFOL 50 MCG/KG/MIN: 10 INJECTION, EMULSION INTRAVENOUS at 13:38

## 2023-08-11 RX ADMIN — SENNOSIDES AND DOCUSATE SODIUM 2 TABLET: 50; 8.6 TABLET ORAL at 20:26

## 2023-08-11 RX ADMIN — Medication 10 ML: at 20:26

## 2023-08-11 RX ADMIN — POTASSIUM CHLORIDE AND DEXTROSE MONOHYDRATE 30 ML/HR: 150; 5 INJECTION, SOLUTION INTRAVENOUS at 11:28

## 2023-08-11 RX ADMIN — FENTANYL CITRATE 250 MCG: 50 INJECTION, SOLUTION INTRAMUSCULAR; INTRAVENOUS at 07:08

## 2023-08-11 RX ADMIN — NITROGLYCERIN 25 MCG/MIN: 10 INJECTION INTRAVENOUS at 07:55

## 2023-08-11 RX ADMIN — AMINOCAPROIC ACID 10 G: 250 INJECTION, SOLUTION INTRAVENOUS at 10:45

## 2023-08-11 RX ADMIN — ROCURONIUM BROMIDE 90 MG: 10 SOLUTION INTRAVENOUS at 07:08

## 2023-08-11 RX ADMIN — ALBUMIN (HUMAN) 250 ML: 12.5 INJECTION, SOLUTION INTRAVENOUS at 15:09

## 2023-08-11 RX ADMIN — ALBUMIN (HUMAN) 250 ML: 12.5 INJECTION, SOLUTION INTRAVENOUS at 11:45

## 2023-08-11 RX ADMIN — NICARDIPINE HYDROCHLORIDE 1 MG: 25 INJECTION, SOLUTION INTRAVENOUS at 08:05

## 2023-08-11 RX ADMIN — ATORVASTATIN CALCIUM 40 MG: 40 TABLET, FILM COATED ORAL at 20:26

## 2023-08-11 RX ADMIN — CEFAZOLIN SODIUM 2 G: 2 INJECTION, SOLUTION INTRAVENOUS at 10:26

## 2023-08-11 RX ADMIN — CHLORHEXIDINE GLUCONATE 15 ML: 1.2 SOLUTION ORAL at 20:24

## 2023-08-11 RX ADMIN — POTASSIUM CHLORIDE 20 MEQ: 29.8 INJECTION, SOLUTION INTRAVENOUS at 13:45

## 2023-08-11 RX ADMIN — LIDOCAINE HYDROCHLORIDE 100 MG: 20 INJECTION, SOLUTION INFILTRATION; PERINEURAL at 07:08

## 2023-08-11 RX ADMIN — Medication 10 MG: at 03:03

## 2023-08-11 RX ADMIN — NICARDIPINE HYDROCHLORIDE 1 MG: 25 INJECTION, SOLUTION INTRAVENOUS at 08:00

## 2023-08-11 RX ADMIN — OXYCODONE HYDROCHLORIDE 10 MG: 10 TABLET ORAL at 22:17

## 2023-08-11 RX ADMIN — MORPHINE SULFATE 4 MG: 4 INJECTION, SOLUTION INTRAMUSCULAR; INTRAVENOUS at 02:56

## 2023-08-11 RX ADMIN — EPHEDRINE SULFATE 10 MG: 50 INJECTION INTRAVENOUS at 07:25

## 2023-08-11 RX ADMIN — FENTANYL CITRATE 250 MCG: 50 INJECTION, SOLUTION INTRAMUSCULAR; INTRAVENOUS at 10:04

## 2023-08-11 RX ADMIN — EPHEDRINE SULFATE 5 MG: 50 INJECTION INTRAVENOUS at 10:10

## 2023-08-11 RX ADMIN — ALBUMIN (HUMAN): 12.5 INJECTION, SOLUTION INTRAVENOUS at 10:10

## 2023-08-11 RX ADMIN — POTASSIUM PHOSPHATE, MONOBASIC POTASSIUM PHOSPHATE, DIBASIC 15 MMOL: 224; 236 INJECTION, SOLUTION, CONCENTRATE INTRAVENOUS at 16:59

## 2023-08-11 RX ADMIN — AMINOCAPROIC ACID 10 G: 250 INJECTION, SOLUTION INTRAVENOUS at 08:00

## 2023-08-11 RX ADMIN — PROTAMINE SULFATE 50 MG: 10 INJECTION, SOLUTION INTRAVENOUS at 12:01

## 2023-08-11 RX ADMIN — MORPHINE SULFATE 2 MG: 2 INJECTION, SOLUTION INTRAMUSCULAR; INTRAVENOUS at 15:51

## 2023-08-11 RX ADMIN — GABAPENTIN 300 MG: 300 CAPSULE ORAL at 20:26

## 2023-08-11 RX ADMIN — HYDROCODONE BITARTRATE AND ACETAMINOPHEN 1 TABLET: 7.5; 325 TABLET ORAL at 20:24

## 2023-08-11 RX ADMIN — ROCURONIUM BROMIDE 30 MG: 10 SOLUTION INTRAVENOUS at 10:08

## 2023-08-11 RX ADMIN — PROTAMINE SULFATE 500 MG: 10 INJECTION, SOLUTION INTRAVENOUS at 10:10

## 2023-08-11 RX ADMIN — DEXMEDETOMIDINE HYDROCHLORIDE 0.2 MCG/KG/HR: 4 INJECTION, SOLUTION INTRAVENOUS at 13:56

## 2023-08-11 RX ADMIN — INSULIN HUMAN 3.5 UNITS/HR: 1 INJECTION, SOLUTION INTRAVENOUS at 08:30

## 2023-08-11 RX ADMIN — CHLORHEXIDINE GLUCONATE 15 ML: 1.2 SOLUTION ORAL at 13:43

## 2023-08-11 RX ADMIN — MUPIROCIN 1 APPLICATION: 20 OINTMENT TOPICAL at 05:19

## 2023-08-11 RX ADMIN — NICARDIPINE HYDROCHLORIDE 1 MG: 25 INJECTION, SOLUTION INTRAVENOUS at 07:45

## 2023-08-11 RX ADMIN — CHLORHEXIDINE GLUCONATE 15 ML: 1.2 SOLUTION ORAL at 05:19

## 2023-08-11 RX ADMIN — ROCURONIUM BROMIDE 40 MG: 10 SOLUTION INTRAVENOUS at 10:47

## 2023-08-11 RX ADMIN — HEPARIN SODIUM 32000 UNITS: 1000 INJECTION, SOLUTION INTRAVENOUS; SUBCUTANEOUS at 08:21

## 2023-08-11 RX ADMIN — PANTOPRAZOLE SODIUM 40 MG: 40 TABLET, DELAYED RELEASE ORAL at 05:18

## 2023-08-11 RX ADMIN — NITROGLYCERIN 20 MCG/MIN: 20 INJECTION INTRAVENOUS at 00:46

## 2023-08-11 NOTE — ANESTHESIA PREPROCEDURE EVALUATION
Anesthesia Evaluation     Patient summary reviewed and Nursing notes reviewed   NPO Solid Status: > 8 hours  NPO Liquid Status: > 2 hours           Airway   Mallampati: II  TM distance: >3 FB  Neck ROM: full  Possible difficult intubation  Dental      Pulmonary    (+) ,decreased breath sounds  Cardiovascular   Exercise tolerance: poor (<4 METS)    ECG reviewed  Patient on routine beta blocker and Beta blocker given within 24 hours of surgery  Rhythm: regular  Rate: normal    (+) hypertension well controlled 2 medications or greater, CAD    ROS comment: Interpretation Summary       ú  Left ventricular systolic function is normal. Estimated left ventricular EF = 70%  ú  The cardiac valves are anatomically and functionally normal.  ú  Estimated right ventricular systolic pressure from tricuspid regurgitation is normal (<35 mmHg).         Neuro/Psych  GI/Hepatic/Renal/Endo    (+) diabetes mellitus type 2 poorly controlled    Musculoskeletal     Abdominal   (+) obese    Abdomen: soft.   Substance History      OB/GYN          Other   arthritis,                     Anesthesia Plan    ASA 4     general     (CVL, A-LINE)  intravenous induction     Anesthetic plan, risks, benefits, and alternatives have been provided, discussed and informed consent has been obtained with: patient.    CODE STATUS:    Code Status (Patient has no pulse and is not breathing): CPR (Attempt to Resuscitate)  Medical Interventions (Patient has pulse or is breathing): Full Support

## 2023-08-11 NOTE — ANESTHESIA PROCEDURE NOTES
Airway  Urgency: elective    Date/Time: 8/11/2023 7:10 AM  Airway not difficult    General Information and Staff    Patient location during procedure: OR  Anesthesiologist: Jerry Bolaños MD    Indications and Patient Condition  Indications for airway management: airway protection    Preoxygenated: yes  MILS not maintained throughout  Mask difficulty assessment: 1 - vent by mask    Final Airway Details  Final airway type: endotracheal airway      Successful airway: ETT  Cuffed: yes   Successful intubation technique: direct laryngoscopy  Endotracheal tube insertion site: oral  Blade: Abril  Blade size: 3  ETT size (mm): 8.0  Cormack-Lehane Classification: grade I - full view of glottis  Placement verified by: chest auscultation and capnometry   Measured from: lips  ETT/EBT  to lips (cm): 20  Number of attempts at approach: 1  Assessment: lips, teeth, and gum same as pre-op and atraumatic intubation    Additional Comments  Negative epigastric sounds, Breath sound equal bilaterally with symmetric chest rise and fall

## 2023-08-11 NOTE — ANESTHESIA POSTPROCEDURE EVALUATION
Patient: Dutch Arellano    Procedure Summary       Date: 08/11/23 Room / Location:  [facility] [address] [place] [address] [facility]    Anesthesia Start: 0702 Anesthesia Stop: 1116    Procedures:       MEDIAN STERNOTOMY, CORONARY ARTERY BYPASS GRAFTING X UTILIZING LEFT INTERNAL MAMMARY ARTERY (Chest)      ENDOSCOPIC VEIN HARVEST OF LEFT GREATER SAPHENOUS VEIN, LEFT ATRIAL APPENDAGE LIGATION, TRANSESOPHAGEAL ECHOCARDIOGRAM PER ANESTHESIA Diagnosis:       Coronary artery disease involving native coronary artery of native heart, unspecified whether angina present      (Coronary artery disease involving native coronary artery of native heart, unspecified whether angina present [I25.10])    Surgeons: Spenser Louise MD Provider: Jerry Bolaños MD    Anesthesia Type: general ASA Status: 4            Anesthesia Type: general    Vitals  No vitals data found for the desired time range.          Post Anesthesia Care and Evaluation    Patient participation: complete - patient cannot participate  Level of consciousness: obtunded/minimal responses  Pain score: 0  Pain management: adequate    Airway patency: patent  Anesthetic complications: No anesthetic complications  PONV Status: none  Cardiovascular status: acceptable  Respiratory status: acceptable  Hydration status: acceptable  No anesthesia care post op

## 2023-08-11 NOTE — OP NOTE
"Operative Report    Preop Diagnosis: Coronary disease.  Questionable history of atrial fibrillation.  Previous coronary stents.  Previous leg amputation above the knee    Results of STS risk score discussed with patient and family    Postoperative Diagnosis: Same      Procedure: Coronary artery bypass grafting x 4 with endoscopic harvesting of the left great saphenous vein.  And left atrial appendage ligation with a size 35 atrial clip.  Left internal mammary artery to the left anterior descending.  Saphenous vein graft to the diagonal branch and LAD.  Saphenous vein graft to the obtuse marginal branch of the circumflex and saphenous vein graft to the right coronary artery        Surgeons: Spenser Louise MD      Assistant: Danica Honeycutt PA-C    The Assistant provided services of suctioning, irrigation and retraction.  Also, assisted in suture closure of parts of the skin incision.      Indication and description\"      patient referred to an axillary transferred to our facility with the above listed medical problems.  He understood that the surgery had with the risk of stroke bleed infection death renal failure and agreed to proceed.  The STS risk database was discussed with the patient.     Supine position sterile prep and drape.  General endotracheal anesthesia and antibiotics given.  The left great saphenous vein was harvested endoscopically and median sternotomy performed and the left internal mammary artery was harvested as a pedicle graft.  Patient was fully heparinized and cannulas placed in the ascending order and right atrial appendage patient begun on cardiopulmonary bypass.  Aorta crossclamped and antegrade cardioplegia given for saphenous vein graft sutured into side fashion to a 2 mm obtuse marginal branch circumflex and the second saphenous vein graft to 1-1/2 mm posterior descending branch of the right coronary which was the only graftable vessel on the inferior surface of the heart.  Third " saphenous vein graft sutured to a very small diffusely diseased 1-1/2 mm diagonal branch of the LAD and then the left internal mammary was anastomosed to the left anterior descending coronary.  3 proximal vein grafts were sutured to the ascending aorta.  Transesophageal echo demonstrated no clot in the left atrial appendage and it was ligated with a size 35 atrial clip.  Patient was weaned from bypass without difficulty and without the need for pressor agents.  Postoperative transesophageal echo demonstrated closure of left atrial appendage and 4 different views were recorded.  Sternum was closed with wire fascia and skin were to be closed with suture sponge and needle count was off by 1700 and a chest x-ray was taken which did not demonstrate any retained objects.  There was no apparent early complications and estimated blood loss 350 mL.              Please note that portions of this note were completed with a voice recognition program. Efforts were made to edit the dictations, but occasionally words are mistranscribed.

## 2023-08-11 NOTE — ADDENDUM NOTE
Addendum  created 08/11/23 1746 by Reggie Camilo MD    Clinical Note Signed, Intraprocedure Blocks edited, SmartForm saved      
Patent

## 2023-08-11 NOTE — ANESTHESIA PROCEDURE NOTES
Central Line      Patient reassessed immediately prior to procedure    Patient location during procedure: OR  Start time: 8/11/2023 7:14 AM  Stop Time:8/11/2023 7:22 AM  Indications: vascular access  Staff  Anesthesiologist: Jerry Bolaños MD  Preanesthetic Checklist  Completed: patient identified, IV checked, site marked, risks and benefits discussed, surgical consent, monitors and equipment checked, pre-op evaluation and timeout performed  Central Line Prep  Sterile Tech:cap, gloves, gown, mask and sterile barriers  Prep: chloraprep  Patient monitoring: blood pressure monitoring, continuous pulse oximetry and EKG  Central Line Procedure  Laterality:right  Location:internal jugular  Catheter Type:MAC  Catheter Size:9 Fr  Guidance:ultrasound guided and landmark technique  PROCEDURE NOTE/ULTRASOUND INTERPRETATION.  Using ultrasound guidance the potential vascular sites for insertion of the catheter were visualized to determine the patency of the vessel to be used for vascular access.  After selecting the appropriate site for insertion, the needle was visualized under ultrasound being inserted into the internal jugular vein, followed by ultrasound confirmation of wire and catheter placement. There were no abnormalities seen on ultrasound; an image was taken; and the patient tolerated the procedure with no complications. Images: still images not obtained  Assessment  Post procedure:biopatch applied, line sutured, occlusive dressing applied and secured with tape  Assessement:blood return through all ports, free fluid flow, chest x-ray ordered and Js Test  Complications:no  Patient Tolerance:patient tolerated the procedure well with no apparent complications

## 2023-08-11 NOTE — PROGRESS NOTES
CTS Progress Note       LOS: 1 day   Patient Care Team:  Provider, No Known as PCP - General    Chief Complaint: Coronary artery disease    Vital Signs:  Temp:  [97.6 øF (36.4 øC)-98.2 øF (36.8 øC)] 98.2 øF (36.8 øC)  Heart Rate:  [] 57  Resp:  [16-18] 18  BP: ()/() 112/66    Physical Exam:  Remittent chest pain     Results:     Results from last 7 days   Lab Units 08/11/23  0309   WBC 10*3/mm3 6.48   HEMOGLOBIN g/dL 14.8   HEMATOCRIT % 41.8   PLATELETS 10*3/mm3 184     Results from last 7 days   Lab Units 08/11/23  0309   SODIUM mmol/L 140   POTASSIUM mmol/L 4.0   CHLORIDE mmol/L 101   CO2 mmol/L 26.0   BUN mg/dL 13   CREATININE mg/dL 0.98   GLUCOSE mg/dL 146*   CALCIUM mg/dL 9.8           Imaging Results (Last 24 Hours)       Procedure Component Value Units Date/Time    XR Chest 1 View [[id number]] Collected: 08/10/23 0741     Updated: 08/10/23 0745    Narrative:      XR CHEST 1 VW    Date of Exam: 8/10/2023 3:55 AM EDT    Indication: preop  preop    Comparison: None available.    Findings:  The cardiomediastinal silhouette is within normal limits. Lungs are clear. No focal consolidation, pneumothorax, or significant pleural effusion. Osseous structures grossly intact. Advanced left glenohumeral osteoarthritis.      Impression:      Impression:  No acute process.      Electronically Signed: Shyam Dominguez    8/10/2023 7:42 AM EDT    Workstation ID: [id number]            Assessment      Coronary artery disease    Coronary artery disease involving native coronary artery of native heart        Plan   Had I have been notified about patient's chest pain at 4 AM we would have proceeded to surgery more urgently    Please note that portions of this note were completed with a voice recognition program. Efforts were made to edit the dictations, but occasionally words are mistranscribed.    Spenser Louise MD  08/11/23  06:41 EDT

## 2023-08-11 NOTE — ANESTHESIA PROCEDURE NOTES
Arterial Line      Patient reassessed immediately prior to procedure    Patient location during procedure: pre-op  Start time: 8/11/2023 6:35 AM  Stop Time:8/11/2023 6:45 AM       Line placed for hemodynamic monitoring.  Performed By   Anesthesiologist: Jerry Bolaños MD   Preanesthetic Checklist  Completed: patient identified, IV checked, site marked, risks and benefits discussed, surgical consent, monitors and equipment checked, pre-op evaluation and timeout performed  Arterial Line Prep    Sterile Tech: cap, gloves and sterile barriers  Prep: ChloraPrep  Patient monitoring: blood pressure monitoring, continuous pulse oximetry and EKG  Arterial Line Procedure   Laterality:left  Location:  radial artery  Catheter size: 20 G   Guidance: palpation technique  Number of attempts: 1  Successful placement: yes   Post Assessment   Dressing Type: line sutured, occlusive dressing applied, secured with tape and wrist guard applied.   Complications no  Circ/Move/Sens Assessment: normal and unchanged.   Patient Tolerance: patient tolerated the procedure well with no apparent complications

## 2023-08-11 NOTE — ANESTHESIA PROCEDURE NOTES
Intra-Op Anesthesia ELVIA    Procedure Performed: Intra-Op Anesthesia ELVIA       Start Time:        End Time:      Preanesthesia Checklist:  Patient identified, IV assessed, risks and benefits discussed, monitors and equipment assessed, procedure being performed at surgeon's request and anesthesia consent obtained.    General Procedure Information  Diagnostic Indications for Echo:  assessment of ascending aorta, assessment of surgical repair, defect repair evaluation and hemodynamic monitoring  Physician Requesting Echo: Spenser Louise MD  Location performed:  OR  Intubated  Bite block not placed  Heart visualized  Probe Insertion:  Easy  Probe Type:  Multiplane  Modalities:  Pulse wave Doppler, continuous wave Doppler, color flow mapping and 2D only    Echocardiographic and Doppler Measurements    Ventricles    Right Ventricle:  Cavity size normal.  Global function normal.    Left Ventricle:  Cavity size normal.  Global Function normal.  Ejection Fraction 65%.      Ventricular Regional Function:  1- Basal Anteroseptal:  normal  2- Basal Anterior:  normal  3- Basal Anterolateral:  normal  4- Basal Inferolateral:  normal  5- Basal Inferior:  normal  6- Basal Inferoseptal:  normal  7- Mid Anteroseptal:  normal  8- Mid Anterior:  normal  9- Mid Anterolateral:  normal  10- Mid Inferolateral:  normal  11- Mid Inferior:  normal  12- Mid Inferoseptal:  normal  13- Apical Anterior:  normal  14- Apical Lateral:  normal  15- Apical Inferior:  normal  16- Apical Septal:  normal  17- [place]:  normal      Valves    Aortic Valve:  Annulus normal.  Stenosis not present.  Regurgitation absent.  Leaflets normal.  Leaflet motions normal.      Mitral Valve:  Annulus normal.  Stenosis not present.  Regurgitation trace.  Leaflets normal.  Leaflet motions normal.      Tricuspid Valve:  Annulus normal.  Stenosis not present.  Regurgitation trace.  Leaflets normal.  Leaflet motions normal.    Other Valve Findings:       ANDREI 3.12 BY  PLANIMETRY    Aorta    Ascending Aorta:  Size normal.  Diameter 3.03 cm.  Dissection not present.  Plaque thickness less than 3 mm.  Mobile plaque not present.    Aortic Arch:  Size normal.  Diameter 2.28 cm.  Dissection not present.  Plaque thickness less than 3 mm.  Mobile plaque not present.    Descending Aorta:  Size normal.  Diameter 2.29 cm.  Dissection not present.  Plaque thickness less than 3 mm.  Mobile plaque not present.        Atria      Left Atrium:  Left atrial appendage normal.  Other Atria Findings:       NO CLOT IN THE PO  PO PWD 53.0    Septa        Ventricular Septum:  Intra-ventricular septum morphology normal.              Anesthesia Information  Performed Personally  Anesthesiologist:  Reggie Camilo MD

## 2023-08-11 NOTE — PLAN OF CARE
Goal Outcome Evaluation:  Plan of Care Reviewed With: patient, family  PT post-op to 2H at 1111h and extubated at 1535 to 3L NC.   Intermittent confusion but following all commands.    2L NC. MT output 1/2: 210 & 3/4: 300. Both atriums have fluctuation and air leaks.  NSR. LEVO gtt at .02mcg. 1L Albumin given.   Dysphagia screen needed.   [id number]=[id number]  99.1 Tmax.      Progress: improving

## 2023-08-11 NOTE — PLAN OF CARE
Goal Outcome Evaluation:           Progress: no change  Outcome Evaluation: Pt with increased chest pain after using urinal at EOB this AM. EKG with increased ST depression. Nitro gtt increased, given morphine and labetalol. Pain and HTN resolved post medications. On 4L NC. Plan for CABG today.

## 2023-08-11 NOTE — PROGRESS NOTES
STS   Procedure Type: Isolated CABG  Perioperative Outcome Estimate %  Operative Mortality 1.62%  Morbidity & Mortality 8.86%  Stroke 1.05%  Renal Failure 0.88%  Reoperation 2.58%  Prolonged Ventilation 5.69%  Deep Sternal Wound Infection 0.172%  Long Hospital Stay (>14 days) 3.92%  Short Hospital Stay (<6 days) 54.6%

## 2023-08-11 NOTE — NURSING NOTE
0230 patient using urinal at EOB under RN supervision. As RN assisted patient back to bed, patient clutched his chest and stated his pain was worse again. Nitro gtt titrated per orders, patient continued to c/o increased chest pain, appeared anxious, became hypertensive (SBP 200s) and tachycardic (110-120). Rapid response called, 12 lead EKG x2 obtained with significant increase in ST depression. Given 4 mg morphine, 10 mg labetalol per CT surgery with improvement in pain and vitals. Awaiting CABG this AM.

## 2023-08-11 NOTE — PROGRESS NOTES
Critical Care Note     LOS: 1 day   Patient Care Team:  Provider, No Known as PCP - General    Chief Complaint/Reason for visit:      CABG x4, August 11, 2023  Diabetes mellitus type 2  Hypertension  GERD  History of right AKA    Subjective     History of Present Illness:   Dutch Arellano is a 73 y.o. male who arrives to ICU from OR today s/p elective CABG per Dr. Louise.     Patient has a PMH of former smoker (currently vapes), HTN, T2DM, CAD s/p stenting, and GERD.     [facility] after presenting to the ED for evaluation of a 2-week history of chest pain and fatigue with workup c/w NSTEMI. He was placed on Heparin and NTG infusions and Cardiology was consulted / he underwent LHC ultimately demonstrating MV CAD (report not available) not amenable to PCI. TTE demonstrated preserved LVEF of 70% with normal LV diastolic function and no significant valvular disease. CTS here was contacted who accepted patient in transfer to [facility] on 8/9/23 for surgical revascularization consideration.     Patient was deemed an operative candidate and today underwent elective CABG per Dr. Louise. He was transferred to ICU for postoperative care and continued medical management.     Time spent: 6 minutes  Electronically signed by Megan Sanabria, KARINA, APRN, 08/11/23, 8:10 AM EDT.     Interval History:     Patient underwent CABG x4, left atrial appendage ligation, LIMA to LAD, saphenous vein to diagonal and LAD, saphenous vein to OM, saphenous vein to RCA today.  He received 2 units of packed red blood cells and surgery.  He is now in the ICU, sedated on propofol 50 mcg/kg/min.  Insulin drip is at 3 units/h and he is on nitroglycerin at 20 mcg/min.  His cardiac output is 7.7, cardiac index 3.7 and he is in sinus rhythm.  Current ventilator settings rate 14 tidal volume 750 PEEP 5 pressure port 10 and 60%.  ABG on 100%, pH 7.41, CO2 39, O2 93.  Hemoglobin on blood gas was 11.7.  He does  "actively vape.  Preoperative FEV1 was 3.05 L, 93%.  Chest tube output 450 mL, urine output 2.2 L today    Review of Systems:    All systems were reviewed and negative except as noted in subjective.    Medical history, surgical history, social history, family history reviewed    Objective     Intake/Output:    Intake/Output Summary (Last 24 hours) at 8/11/2023 1331  Last data filed at 8/11/2023 1300  Gross per 24 hour   Intake 2687.48 ml   Output 3945 ml   Net -1257.52 ml       Nutrition:  NPO Diet NPO Type: Strict NPO    Infusions:  dexmedetomidine, 0.2-1.5 mcg/kg/hr  dextrose 5 % with KCl 20 mEq, 30 mL/hr, Last Rate: 30 mL/hr (08/11/23 1128)  DOBUTamine, 2-20 mcg/kg/min  DOPamine, 2-20 mcg/kg/min  EPINEPHrine, 0.02-0.3 mcg/kg/min  insulin, 0-100 Units/hr, Last Rate: 3 Units/hr (08/11/23 1128)  niCARdipine, 5-15 mg/hr  niCARdipine, 5-15 mg/hr  nitroglycerin, 10-50 mcg/min, Last Rate: 30 mcg/min (08/11/23 0418)  nitroglycerin, 5-200 mcg/min, Last Rate: 20 mcg/min (08/11/23 1128)  norepinephrine, 0.02-0.3 mcg/kg/min  phenylephrine, 0.5-3 mcg/kg/min  propofol, 5-50 mcg/kg/min  propofol, 5-50 mcg/kg/min, Last Rate: 50 mcg/kg/min (08/11/23 1212)        Mechanical Ventilator Settings:            Resp Rate (Set): 14  Pressure Support (cm H2O): 10 cm H20  FiO2 (%): 60 %  PEEP/CPAP (cm H2O): 5 cm H20    Minute Ventilation (L/min) (Obs): 10.4 L/min  Resp Rate (Observed) Vent: 14  I:E Ratio (Set): 1:3.09  I:E Ratio (Obs): 1:3.1    PIP Observed (cm H2O): 22 cm H2O       Telemetry: Sinus rhythm             Vital Signs  Blood pressure 105/65, pulse 74, temperature 95.9 øF (35.5 øC), temperature source Bladder, resp. rate 14, height 180.3 cm (71\"), weight 89.4 kg (197 lb), SpO2 97 %.    Physical Exam:  General Appearance:  Older gentleman supine in bed sedated   Head:  Alopecia   Eyes:          Pupils are small and equal, conjunctiva pink   Ears:     Throat: Orally intubated   Neck: Right IJ line, trachea midline, no crepitus "   Back:      Lungs:   Sternotomy incision intact.  Mediastinal tubes with bloody drainage.  Breath sounds are bilateral without wheeze or rhonchi    Heart:  Regular rhythm, S1, S2 present, soft rub   Abdomen:   Hypoactive bowel sounds, soft   Rectal:   Deferred   Extremities: Left lower extremity Ace wrap, toes with good capillary refill, right leg AKA   Pulses:    Skin: Warm and dry without rash   Lymph nodes: No cervical adenopathy   Neurologic: Sedated      Results Review:     I reviewed the patient's new clinical results.   Results from last 7 days   Lab Units 08/11/23  1203 08/11/23  0309 08/10/23  0104   SODIUM mmol/L 140 140 140   POTASSIUM mmol/L 3.7 4.0 4.1   CHLORIDE mmol/L 103 101 102   CO2 mmol/L 23.0 26.0 29.0   BUN mg/dL 12 13 16   CREATININE mg/dL 1.07 0.98 0.98   CALCIUM mg/dL 9.6 9.8 9.6   BILIRUBIN mg/dL  --   --  0.3   ALK PHOS U/L  --   --  58   ALT (SGPT) U/L  --   --  40   AST (SGOT) U/L  --   --  27   GLUCOSE mg/dL 142* 146* 171*     Results from last 7 days   Lab Units 08/11/23  1126 08/11/23  0309 08/10/23  0104   WBC 10*3/mm3 10.20 6.48 7.35   HEMOGLOBIN g/dL 11.0* 14.8 14.1   HEMATOCRIT % 31.4* 41.8 39.7   PLATELETS 10*3/mm3 141 184 184     Results from last 7 days   Lab Units 08/11/23  1132   PH, ARTERIAL pH units 7.415   PO2 ART mm Hg 93.6   PCO2, ARTERIAL mm Hg 39.4   HCO3 ART mmol/L 25.2     No results found for: BLOODCX  No results found for: URINECX    I reviewed the patient's new imaging including images and reports.    Comparison: 8/10/2023.    Findings:  There are new sternal suture wires. There is a new left atrial appendage clip. ET tube is in place several centimeters above the [place]. NG tube is directed towards the left upper quadrant although tip is not well seen. There is a left chest tube  overlying the left upper lobe. There are 2 mediastinal drains. There is no identifiable pneumothorax. There is mild cardiomegaly. There is mild atelectasis in the lung bases, greatest on  the left. There is a right internal jugular sheath with the tip in  the SVC.   Impression:     Impression:  Postoperative changes with various tubes and catheters in place as noted. Mild bibasilar atelectasis, greatest on the left.      Electronically Signed: Mercy Toth MD   8/11/2023 12:03 PM EDT       All medications reviewed.   acetaminophen, 1,000 mg, Intravenous, Once  [START ON 8/12/2023] acetaminophen, 650 mg, Oral, Q8H  allopurinol, 100 mg, Oral, Daily  [START ON 8/12/2023] aspirin, 325 mg, Oral, Daily  aspirin, 325 mg, Oral, Once  atorvastatin, 40 mg, Oral, Nightly  [START ON 8/12/2023] carvedilol, 12.5 mg, Oral, BID With Meals  ceFAZolin, 2 g, Intravenous, Q8H  chlorhexidine, 15 mL, Mouth/Throat, Q12H  gabapentin, 300 mg, Oral, Q12H  hydroCHLOROthiazide, 37.5 mg, Oral, Daily  metoprolol tartrate, 2.5 mg, Intravenous, Q6H  pantoprazole, 40 mg, Oral, Q AM  pharmacy consult - [facility], , Does not apply, Daily  senna-docusate sodium, 2 tablet, Oral, BID  sodium chloride, 10 mL, Intravenous, Q12H          Assessment & Plan       MV CAD    T2DM    HTN (hypertension)    Former smoker    Vapes nicotine containing substance    GERD      73-year-old gentleman with diabetes, hypertension, nicotine addiction undergoing CABG x4 today.  He is now in the ICU in sinus rhythm with excellent cardiac output of 7.7.  He had 450 at his mediastinal drains and did receive 2 units of packed cells and surgery.  Initial hemoglobin in ICU was 11.  Preoperative echocardiogram revealed an EF of 70% and normal functioning cardiac valves.    His hemoglobin A1c is 7.1.  He is not on any diabetic medications according to his outpatient medication record.  Per protocol insulin drip is at 3 units/h     Lipid panel revealed triglycerides of 474 with a total cholesterol 192.  He was on omega-3 fatty acids as an outpatient.    Preoperative FEV1 was normal at 93% despite previous tobacco use and ongoing vaping.  No bronchospasm on examination.   FiO2 was weaned to 60% after initial blood gases and saturation remains 97%    PLAN:    Anticipate that he will wean per protocol  Monitor rhythm, mediastinal tube output, H&H, urine output  Replace electrolytes as needed  Insulin drip  Aspirin, statin, beta-blocker  I may hold his outpatient oral medications until he is extubated        VTE Prophylaxis: foot pumps    Stress Ulcer Prophylaxis: Protonix    Marni Gutierrez MD  Pulmonary and critical care    Time: Critical care 35 min  I personally provided care to this critically ill patient as documented above.  Critical care time does not include time spent on separately billed procedures.  None of my critical care time was concurrent with other critical care providers.

## 2023-08-11 NOTE — PROGRESS NOTES
"  [facility]  PROGRESS NOTE    Date of Admission: 8/9/2023  Date of Service: 08/11/23    Primary Care Physician: Provider, No Known    Chief Complaint: f/u HTN, HLD, MVCAD   Problem List:   MV CAD    T2DM    HTN (hypertension)    Former smoker    Vapes nicotine containing substance    GERD      Subjective      Patient seen post-op, sedated and intubated. Events noted. Spoke to nursing at bedside      Objective   Vitals: /64 (BP Location: Left arm, Patient Position: Lying)   Pulse 74   Temp 96.3 øF (35.7 øC) (Bladder)   Resp 14   Ht 180.3 cm (71\")   Wt 89.4 kg (197 lb)   SpO2 100%   BMI 27.48 kg/mý     Physical Exam:  GENERAL: Sedated, intubated   HEART: Regular rhythm, normal rate, loud pericardial rub   LUNGS: Intubated on mechanical ventilation. No wheezing, rales or rhonchi.  NEUROLOGIC: Sedated, intubated   EXTREMITIES: No edema noted. Right AKA, left LE wrap in place    Results:  Results from last 7 days   Lab Units 08/11/23  1126 08/11/23  0309 08/10/23  0104   WBC 10*3/mm3 10.20 6.48 7.35   HEMOGLOBIN g/dL 11.0* 14.8 14.1   HEMATOCRIT % 31.4* 41.8 39.7   PLATELETS 10*3/mm3 141 184 184     Results from last 7 days   Lab Units 08/11/23  1203 08/11/23  0309 08/10/23  0104   SODIUM mmol/L 140 140 140   POTASSIUM mmol/L 3.7 4.0 4.1   CHLORIDE mmol/L 103 101 102   CO2 mmol/L 23.0 26.0 29.0   BUN mg/dL 12 13 16   CREATININE mg/dL 1.07 0.98 0.98   GLUCOSE mg/dL 142* 146* 171*      Lab Results   Component Value Date    CHOL 192 08/10/2023    TRIG 474 (H) 08/10/2023    HDL 29 (L) 08/10/2023    LDL 86 08/10/2023    AST 27 08/10/2023    ALT 40 08/10/2023     Results from last 7 days   Lab Units 08/10/23  0104   HEMOGLOBIN A1C % 7.10*     Results from last 7 days   Lab Units 08/10/23  0104   CHOLESTEROL mg/dL 192   TRIGLYCERIDES mg/dL 474*   HDL CHOL mg/dL 29*   LDL CHOL mg/dL 86           Results from last 7 days   Lab Units 08/11/23  1126 08/10/23  0104   PROTIME " Seconds 18.3* 13.9   INR  1.50* 1.06   APTT seconds 31.6 44.2*       Intake/Output Summary (Last 24 hours) at 8/11/2023 1256  Last data filed at 8/11/2023 1129  Gross per 24 hour   Intake 2437.48 ml   Output 3275 ml   Net -837.52 ml       I personally reviewed the patient's EKG/Telemetry data    Radiology Data:   Echo 8/10/23:  Interpretation Summary         Left ventricular systolic function is normal. Estimated left ventricular EF = 70%    The cardiac valves are anatomically and functionally normal.    Estimated right ventricular systolic pressure from tricuspid regurgitation is normal (<35 mmHg).    CXR 8/11/23:  IMPRESSION:  Impression:  Postoperative changes with various tubes and catheters in place as noted. Mild bibasilar atelectasis, greatest on the left    Current Medications:  acetaminophen, 1,000 mg, Intravenous, Once  [START ON 8/12/2023] acetaminophen, 650 mg, Oral, Q8H  allopurinol, 100 mg, Oral, Daily  [START ON 8/12/2023] aspirin, 325 mg, Oral, Daily  aspirin, 325 mg, Oral, Once  atorvastatin, 40 mg, Oral, Nightly  [START ON 8/12/2023] carvedilol, 12.5 mg, Oral, BID With Meals  ceFAZolin, 2 g, Intravenous, Q8H  chlorhexidine, 15 mL, Mouth/Throat, Q12H  gabapentin, 300 mg, Oral, Q12H  hydroCHLOROthiazide, 37.5 mg, Oral, Daily  metoprolol tartrate, 2.5 mg, Intravenous, Q6H  pantoprazole, 40 mg, Oral, Q AM  pharmacy consult - [facility], , Does not apply, Daily  senna-docusate sodium, 2 tablet, Oral, BID  sodium chloride, 10 mL, Intravenous, Q12H      dexmedetomidine, 0.2-1.5 mcg/kg/hr  dextrose 5 % with KCl 20 mEq, 30 mL/hr, Last Rate: 30 mL/hr (08/11/23 1128)  DOBUTamine, 2-20 mcg/kg/min  DOPamine, 2-20 mcg/kg/min  EPINEPHrine, 0.02-0.3 mcg/kg/min  insulin, 0-100 Units/hr, Last Rate: 3 Units/hr (08/11/23 1128)  niCARdipine, 5-15 mg/hr  niCARdipine, 5-15 mg/hr  nitroglycerin, 10-50 mcg/min, Last Rate: 30 mcg/min (08/11/23 0418)  nitroglycerin, 5-200 mcg/min, Last Rate: 20 mcg/min (08/11/23  1128)  norepinephrine, 0.02-0.3 mcg/kg/min  phenylephrine, 0.5-3 mcg/kg/min  propofol, 5-50 mcg/kg/min  propofol, 5-50 mcg/kg/min, Last Rate: 50 mcg/kg/min (08/11/23 1212)           Assessment and Plan:   1. MVCAD  - normal EF pre-op  - CABG x4 with Dr. Louise 8/11 (LIMA-LAD, SVG to diagonal, OM and RCA)  - PO clip with #35 Atricure clip  - continue ASA, statin   - hemodynamically stable immediately post-op, EKG reviewed    2. Hypertension   - On Nitro gtt at 20mcg/kg/min immediately post-op   - resume BB when able   - consider ACE/ARB if needed as next agent     3. Hyperlipidemia   - LDL 86, Trig 474  - on Lipitor 40mg    4. DM2  - A1C 7.1%  - per intensivists        Electronically signed by Laureen Perez PA-C, 08/11/23, 1:02 PM EDT.

## 2023-08-12 ENCOUNTER — APPOINTMENT (OUTPATIENT)
Dept: GENERAL RADIOLOGY | Facility: HOSPITAL | Age: 73
DRG: 236 | End: 2023-08-12
Payer: OTHER GOVERNMENT

## 2023-08-12 LAB
ALBUMIN SERPL-MCNC: 4.7 G/DL (ref 3.5–5.2)
ALBUMIN/GLOB SERPL: 3.4 G/DL
ALP SERPL-CCNC: 39 U/L (ref 39–117)
ALT SERPL W P-5'-P-CCNC: 32 U/L (ref 1–41)
ANION GAP SERPL CALCULATED.3IONS-SCNC: 13 MMOL/L (ref 5–15)
AST SERPL-CCNC: 66 U/L (ref 1–40)
BASOPHILS # BLD AUTO: 0.03 10*3/MM3 (ref 0–0.2)
BASOPHILS NFR BLD AUTO: 0.3 % (ref 0–1.5)
BH BB BLOOD EXPIRATION DATE: NORMAL
BH BB BLOOD TYPE BARCODE: 6200
BH BB BLOOD TYPE BARCODE: 7300
BH BB BLOOD TYPE BARCODE: 7300
BH BB DISPENSE STATUS: NORMAL
BH BB PRODUCT CODE: NORMAL
BH BB UNIT NUMBER: NORMAL
BILIRUB SERPL-MCNC: 0.7 MG/DL (ref 0–1.2)
BUN SERPL-MCNC: 14 MG/DL (ref 8–23)
BUN/CREAT SERPL: 11.4 (ref 7–25)
CALCIUM SPEC-SCNC: 9.1 MG/DL (ref 8.6–10.5)
CHLORIDE SERPL-SCNC: 101 MMOL/L (ref 98–107)
CO2 SERPL-SCNC: 25 MMOL/L (ref 22–29)
CREAT SERPL-MCNC: 1.23 MG/DL (ref 0.76–1.27)
CROSSMATCH INTERPRETATION: NORMAL
CROSSMATCH INTERPRETATION: NORMAL
DEPRECATED RDW RBC AUTO: 46 FL (ref 37–54)
EGFRCR SERPLBLD CKD-EPI 2021: 62.4 ML/MIN/1.73
EOSINOPHIL # BLD AUTO: 0.03 10*3/MM3 (ref 0–0.4)
EOSINOPHIL NFR BLD AUTO: 0.3 % (ref 0.3–6.2)
ERYTHROCYTE [DISTWIDTH] IN BLOOD BY AUTOMATED COUNT: 13.8 % (ref 12.3–15.4)
GLOBULIN UR ELPH-MCNC: 1.4 GM/DL
GLUCOSE BLDC GLUCOMTR-MCNC: 137 MG/DL (ref 70–130)
GLUCOSE BLDC GLUCOMTR-MCNC: 148 MG/DL (ref 70–130)
GLUCOSE BLDC GLUCOMTR-MCNC: 161 MG/DL (ref 70–130)
GLUCOSE BLDC GLUCOMTR-MCNC: 162 MG/DL (ref 70–130)
GLUCOSE BLDC GLUCOMTR-MCNC: 162 MG/DL (ref 70–130)
GLUCOSE BLDC GLUCOMTR-MCNC: 166 MG/DL (ref 70–130)
GLUCOSE BLDC GLUCOMTR-MCNC: 166 MG/DL (ref 70–130)
GLUCOSE BLDC GLUCOMTR-MCNC: 171 MG/DL (ref 70–130)
GLUCOSE BLDC GLUCOMTR-MCNC: 173 MG/DL (ref 70–130)
GLUCOSE BLDC GLUCOMTR-MCNC: 176 MG/DL (ref 70–130)
GLUCOSE BLDC GLUCOMTR-MCNC: 178 MG/DL (ref 70–130)
GLUCOSE BLDC GLUCOMTR-MCNC: 184 MG/DL (ref 70–130)
GLUCOSE BLDC GLUCOMTR-MCNC: 215 MG/DL (ref 70–130)
GLUCOSE SERPL-MCNC: 183 MG/DL (ref 65–99)
HCT VFR BLD AUTO: 28.8 % (ref 37.5–51)
HGB BLD-MCNC: 10 G/DL (ref 13–17.7)
IMM GRANULOCYTES # BLD AUTO: 0.04 10*3/MM3 (ref 0–0.05)
IMM GRANULOCYTES NFR BLD AUTO: 0.4 % (ref 0–0.5)
INR PPP: 1.15 (ref 0.89–1.12)
LYMPHOCYTES # BLD AUTO: 0.79 10*3/MM3 (ref 0.7–3.1)
LYMPHOCYTES NFR BLD AUTO: 6.9 % (ref 19.6–45.3)
MAGNESIUM SERPL-MCNC: 2 MG/DL (ref 1.6–2.4)
MCH RBC QN AUTO: 31.9 PG (ref 26.6–33)
MCHC RBC AUTO-ENTMCNC: 34.7 G/DL (ref 31.5–35.7)
MCV RBC AUTO: 92 FL (ref 79–97)
MONOCYTES # BLD AUTO: 0.77 10*3/MM3 (ref 0.1–0.9)
MONOCYTES NFR BLD AUTO: 6.7 % (ref 5–12)
NEUTROPHILS NFR BLD AUTO: 85.4 % (ref 42.7–76)
NEUTROPHILS NFR BLD AUTO: 9.75 10*3/MM3 (ref 1.7–7)
NRBC BLD AUTO-RTO: 0 /100 WBC (ref 0–0.2)
PHOSPHATE SERPL-MCNC: 5.2 MG/DL (ref 2.5–4.5)
PLATELET # BLD AUTO: 160 10*3/MM3 (ref 140–450)
PMV BLD AUTO: 9.7 FL (ref 6–12)
POTASSIUM SERPL-SCNC: 4.2 MMOL/L (ref 3.5–5.2)
PROT SERPL-MCNC: 6.1 G/DL (ref 6–8.5)
PROTHROMBIN TIME: 14.8 SECONDS (ref 12.2–14.5)
QT INTERVAL: 374 MS
QTC INTERVAL: 479 MS
RBC # BLD AUTO: 3.13 10*6/MM3 (ref 4.14–5.8)
SODIUM SERPL-SCNC: 139 MMOL/L (ref 136–145)
UNIT  ABO: NORMAL
UNIT  RH: NORMAL
WBC NRBC COR # BLD: 11.41 10*3/MM3 (ref 3.4–10.8)

## 2023-08-12 PROCEDURE — 25010000002 CEFAZOLIN IN DEXTROSE 2-4 GM/100ML-% SOLUTION: Performed by: PHYSICIAN ASSISTANT

## 2023-08-12 PROCEDURE — 80053 COMPREHEN METABOLIC PANEL: CPT | Performed by: PHYSICIAN ASSISTANT

## 2023-08-12 PROCEDURE — 99233 SBSQ HOSP IP/OBS HIGH 50: CPT | Performed by: INTERNAL MEDICINE

## 2023-08-12 PROCEDURE — 93005 ELECTROCARDIOGRAM TRACING: CPT | Performed by: PHYSICIAN ASSISTANT

## 2023-08-12 PROCEDURE — 71045 X-RAY EXAM CHEST 1 VIEW: CPT

## 2023-08-12 PROCEDURE — 94640 AIRWAY INHALATION TREATMENT: CPT

## 2023-08-12 PROCEDURE — 84100 ASSAY OF PHOSPHORUS: CPT | Performed by: THORACIC SURGERY (CARDIOTHORACIC VASCULAR SURGERY)

## 2023-08-12 PROCEDURE — 25010000002 AMIODARONE IN DEXTROSE 5% 150-4.21 MG/100ML-% SOLUTION: Performed by: NURSE PRACTITIONER

## 2023-08-12 PROCEDURE — 25010000002 MORPHINE PER 10 MG: Performed by: THORACIC SURGERY (CARDIOTHORACIC VASCULAR SURGERY)

## 2023-08-12 PROCEDURE — 99232 SBSQ HOSP IP/OBS MODERATE 35: CPT

## 2023-08-12 PROCEDURE — 85610 PROTHROMBIN TIME: CPT | Performed by: PHYSICIAN ASSISTANT

## 2023-08-12 PROCEDURE — 94799 UNLISTED PULMONARY SVC/PX: CPT

## 2023-08-12 PROCEDURE — 93005 ELECTROCARDIOGRAM TRACING: CPT | Performed by: INTERNAL MEDICINE

## 2023-08-12 PROCEDURE — 25010000002 AMIODARONE IN DEXTROSE 5% 360-4.14 MG/200ML-% SOLUTION: Performed by: NURSE PRACTITIONER

## 2023-08-12 PROCEDURE — 63710000001 INSULIN DETEMIR PER 5 UNITS: Performed by: INTERNAL MEDICINE

## 2023-08-12 PROCEDURE — 93010 ELECTROCARDIOGRAM REPORT: CPT | Performed by: INTERNAL MEDICINE

## 2023-08-12 PROCEDURE — 83735 ASSAY OF MAGNESIUM: CPT | Performed by: PHYSICIAN ASSISTANT

## 2023-08-12 PROCEDURE — 63710000001 INSULIN LISPRO (HUMAN) PER 5 UNITS: Performed by: INTERNAL MEDICINE

## 2023-08-12 PROCEDURE — 82948 REAGENT STRIP/BLOOD GLUCOSE: CPT

## 2023-08-12 PROCEDURE — 85025 COMPLETE CBC W/AUTO DIFF WBC: CPT | Performed by: PHYSICIAN ASSISTANT

## 2023-08-12 PROCEDURE — 97162 PT EVAL MOD COMPLEX 30 MIN: CPT

## 2023-08-12 RX ORDER — AMIODARONE HYDROCHLORIDE 200 MG/1
200 TABLET ORAL DAILY
Status: DISCONTINUED | OUTPATIENT
Start: 2023-09-03 | End: 2023-08-17 | Stop reason: HOSPADM

## 2023-08-12 RX ORDER — AMIODARONE HYDROCHLORIDE 200 MG/1
200 TABLET ORAL EVERY 8 HOURS
Status: DISCONTINUED | OUTPATIENT
Start: 2023-08-14 | End: 2023-08-17 | Stop reason: HOSPADM

## 2023-08-12 RX ORDER — AMIODARONE HYDROCHLORIDE 200 MG/1
200 TABLET ORAL EVERY 12 HOURS
Status: DISCONTINUED | OUTPATIENT
Start: 2023-08-20 | End: 2023-08-17 | Stop reason: HOSPADM

## 2023-08-12 RX ORDER — IPRATROPIUM BROMIDE AND ALBUTEROL SULFATE 2.5; .5 MG/3ML; MG/3ML
3 SOLUTION RESPIRATORY (INHALATION)
Status: DISPENSED | OUTPATIENT
Start: 2023-08-12 | End: 2023-08-15

## 2023-08-12 RX ORDER — INSULIN LISPRO 100 [IU]/ML
3-14 INJECTION, SOLUTION INTRAVENOUS; SUBCUTANEOUS
Status: DISCONTINUED | OUTPATIENT
Start: 2023-08-12 | End: 2023-08-17 | Stop reason: HOSPADM

## 2023-08-12 RX ORDER — AMIODARONE HYDROCHLORIDE 200 MG/1
200 TABLET ORAL ONCE
Status: COMPLETED | OUTPATIENT
Start: 2023-08-13 | End: 2023-08-13

## 2023-08-12 RX ADMIN — SENNOSIDES AND DOCUSATE SODIUM 2 TABLET: 50; 8.6 TABLET ORAL at 20:03

## 2023-08-12 RX ADMIN — INSULIN DETEMIR 15 UNITS: 100 INJECTION, SOLUTION SUBCUTANEOUS at 13:26

## 2023-08-12 RX ADMIN — MORPHINE SULFATE 2 MG: 2 INJECTION, SOLUTION INTRAMUSCULAR; INTRAVENOUS at 02:49

## 2023-08-12 RX ADMIN — Medication 2 G: at 18:30

## 2023-08-12 RX ADMIN — GABAPENTIN 300 MG: 300 CAPSULE ORAL at 20:03

## 2023-08-12 RX ADMIN — Medication 2 G: at 02:02

## 2023-08-12 RX ADMIN — MORPHINE SULFATE 2 MG: 2 INJECTION, SOLUTION INTRAMUSCULAR; INTRAVENOUS at 00:07

## 2023-08-12 RX ADMIN — INSULIN LISPRO 5 UNITS: 100 INJECTION, SOLUTION INTRAVENOUS; SUBCUTANEOUS at 21:44

## 2023-08-12 RX ADMIN — IPRATROPIUM BROMIDE AND ALBUTEROL SULFATE 3 ML: 2.5; .5 SOLUTION RESPIRATORY (INHALATION) at 22:43

## 2023-08-12 RX ADMIN — METOPROLOL TARTRATE 2.5 MG: 5 INJECTION INTRAVENOUS at 00:07

## 2023-08-12 RX ADMIN — Medication 10 ML: at 22:37

## 2023-08-12 RX ADMIN — AMIODARONE HYDROCHLORIDE 150 MG: 1.5 INJECTION, SOLUTION INTRAVENOUS at 17:27

## 2023-08-12 RX ADMIN — OXYCODONE HYDROCHLORIDE 10 MG: 10 TABLET ORAL at 22:19

## 2023-08-12 RX ADMIN — AMIODARONE HYDROCHLORIDE 1 MG/MIN: 1.8 INJECTION, SOLUTION INTRAVENOUS at 17:28

## 2023-08-12 RX ADMIN — ASPIRIN 325 MG: 325 TABLET, COATED ORAL at 08:08

## 2023-08-12 RX ADMIN — ACETAMINOPHEN 650 MG: 325 TABLET ORAL at 20:03

## 2023-08-12 RX ADMIN — OXYCODONE HYDROCHLORIDE 10 MG: 10 TABLET ORAL at 02:02

## 2023-08-12 RX ADMIN — ATORVASTATIN CALCIUM 40 MG: 40 TABLET, FILM COATED ORAL at 20:03

## 2023-08-12 RX ADMIN — MORPHINE SULFATE 2 MG: 2 INJECTION, SOLUTION INTRAMUSCULAR; INTRAVENOUS at 07:20

## 2023-08-12 RX ADMIN — IPRATROPIUM BROMIDE AND ALBUTEROL SULFATE 3 ML: 2.5; .5 SOLUTION RESPIRATORY (INHALATION) at 16:23

## 2023-08-12 RX ADMIN — HYDROCODONE BITARTRATE AND ACETAMINOPHEN 1 TABLET: 7.5; 325 TABLET ORAL at 05:43

## 2023-08-12 RX ADMIN — IPRATROPIUM BROMIDE AND ALBUTEROL SULFATE 3 ML: 2.5; .5 SOLUTION RESPIRATORY (INHALATION) at 20:37

## 2023-08-12 RX ADMIN — CARVEDILOL 12.5 MG: 12.5 TABLET, FILM COATED ORAL at 08:08

## 2023-08-12 RX ADMIN — GABAPENTIN 300 MG: 300 CAPSULE ORAL at 08:08

## 2023-08-12 RX ADMIN — OXYCODONE HYDROCHLORIDE 10 MG: 10 TABLET ORAL at 08:08

## 2023-08-12 RX ADMIN — Medication 2 G: at 10:12

## 2023-08-12 RX ADMIN — METOPROLOL TARTRATE 2.5 MG: 5 INJECTION INTRAVENOUS at 06:06

## 2023-08-12 RX ADMIN — HYDROCODONE BITARTRATE AND ACETAMINOPHEN 1 TABLET: 7.5; 325 TABLET ORAL at 01:03

## 2023-08-12 RX ADMIN — SENNOSIDES AND DOCUSATE SODIUM 2 TABLET: 50; 8.6 TABLET ORAL at 08:08

## 2023-08-12 RX ADMIN — PANTOPRAZOLE SODIUM 40 MG: 40 TABLET, DELAYED RELEASE ORAL at 06:07

## 2023-08-12 RX ADMIN — HYDROCODONE BITARTRATE AND ACETAMINOPHEN 1 TABLET: 7.5; 325 TABLET ORAL at 10:12

## 2023-08-12 RX ADMIN — CHLORHEXIDINE GLUCONATE 15 ML: 1.2 SOLUTION ORAL at 20:03

## 2023-08-12 NOTE — THERAPY EVALUATION
Patient Name: Dutch Arellano  : 1950    MRN: [id number]                              Today's Date: 2023       Admit Date: 2023    Visit Dx: No diagnosis found.  Patient Active Problem List   Diagnosis    Coronary artery disease involving native coronary artery of native heart    MV CAD    T2DM    HTN (hypertension)    Former smoker    Vapes nicotine containing substance    GERD     Past Medical History:   Diagnosis Date    Coronary artery disease     Diabetes mellitus     Former smoker 2023    HTN (hypertension) 2023    Hypertension     Vapes nicotine containing substance 2023     Past Surgical History:   Procedure Laterality Date    LEFT HEART CATH        General Information       Row Name 23 1411          Physical Therapy Time and Intention    Document Type evaluation  -KG     Mode of Treatment physical therapy  -KG       Row Name 23 1411          General Information    Patient Profile Reviewed yes  -KG     Prior Level of Function independent:;all household mobility;gait;transfer;ADL's;dressing;bathing  -KG     Existing Precautions/Restrictions cardiac;fall;oxygen therapy device and L/min;sternal;other (see comments)  remote AKA with prosthesis; confusion  -KG     Barriers to Rehab medically complex;previous functional deficit;cognitive status  -KG       Row Name 23 1411          Living Environment    People in Home spouse  -KG       Row Name 23 1411          Home Main Entrance    Number of Stairs, Main Entrance none  ramp  -KG       Row Name 23 1411          Stairs Within Home, Primary    Number of Stairs, Within Home, Primary none  -KG       Row Name 23 1411          Cognition    Orientation Status (Cognition) oriented to;person;place  -KG       Row Name 23 141          Safety Issues, Functional Mobility    Safety Issues Affecting Function (Mobility) awareness of need for assistance;insight into deficits/self-awareness;safety precaution  awareness;safety precautions follow-through/compliance;sequencing abilities  -KG     Impairments Affecting Function (Mobility) balance;cognition;coordination;endurance/activity tolerance;postural/trunk control;pain;shortness of breath;strength  -KG     Cognitive Impairments, Mobility Safety/Performance awareness, need for assistance;insight into deficits/self-awareness;safety precaution awareness;safety precaution follow-through;sequencing abilities  -KG               User Key  (r) = Recorded By, (t) = Taken By, (c) = Cosigned By      Initials Name Provider Type    KG Chelsea Contreras, PT Physical Therapist                   Mobility       Row Name 08/12/23 1412          Bed Mobility    Comment, (Bed Mobility) UIC  -KG       Row Name 08/12/23 1412          Transfers    Comment, (Transfers) STS x2 from chair. Pt required increased cues for sequencing and safe hand placement to maintain sternal precautions. Pt non-compliant with sternal precautions despite cues. Limited by confusion and poor command following.  -KG       Row Name 08/12/23 1412          Sit-Stand Transfer    Sit-Stand [place] (Transfers) moderate assist (50% patient effort);2 person assist;verbal cues  -KG       Row Name 08/12/23 1412          Gait/Stairs (Locomotion)    [place] Level (Gait) moderate assist (50% patient effort);2 person assist;1 person to manage equipment;verbal cues  chair follow  -KG     Assistive Device (Gait) other (see comments)  B UE support on tripod monitor  -KG     Distance in Feet (Gait) 15  -KG     Deviations/Abnormal Patterns (Gait) bilateral deviations;eliezer decreased;stride length decreased  -KG     Bilateral Gait Deviations forward flexed posture;heel strike decreased  -KG     Right Sided Gait Deviations weight shift ability decreased  -KG     Comment, (Gait/Stairs) Pt demonstrated step to gait pattern with slow eliezer and decreased step length. Max cueing for upright posture with forward gaze. Pt with  decreased weight shifting onto R LE. Pt with c/o increased dizziness; returned to room in chair. Pt limited by confusion and decreased command following.  -KG               User Key  (r) = Recorded By, (t) = Taken By, (c) = Cosigned By      Initials Name Provider Type    Chelsea Coyne PT Physical Therapist                   Obj/Interventions       Row Name 08/12/23 1414          Range of Motion Comprehensive    General Range of Motion no range of motion deficits identified  -KG     Comment, General Range of Motion LLE WFL; R hip WFL  -KG       Row Name 08/12/23 1414          Strength Comprehensive (MMT)    Comment, General Manual Muscle Testing (MMT) Assessment LLE grossly 3+/5  -KG       Row Name 08/12/23 1414          Balance    Balance Assessment sitting static balance;standing static balance;standing dynamic balance  -KG     Static Sitting Balance contact guard  -KG     Position, Sitting Balance unsupported;sitting in chair  -KG     Static Standing Balance minimal assist;2-person assist  -KG     Dynamic Standing Balance moderate assist;2-person assist  -KG     Position/Device Used, Standing Balance supported  -KG       Row Name 08/12/23 1414          Sensory Assessment (Somatosensory)    Sensory Assessment (Somatosensory) LE sensation intact  -KG               User Key  (r) = Recorded By, (t) = Taken By, (c) = Cosigned By      Initials Name Provider Type    Chelsea Coyne PT Physical Therapist                   Goals/Plan       Row Name 08/12/23 1418          Bed Mobility Goal 1 (PT)    Activity/Assistive Device (Bed Mobility Goal 1, PT) sit to supine;supine to sit  -KG     [place] Level/Cues Needed (Bed Mobility Goal 1, PT) moderate assist (50-74% patient effort)  -KG     Time Frame (Bed Mobility Goal 1, PT) 2 weeks  -KG     Progress/Outcomes (Bed Mobility Goal 1, PT) goal ongoing  -KG       Row Name 08/12/23 1418          Transfer Goal 1 (PT)    Activity/Assistive Device (Transfer  Goal 1, PT) sit-to-stand/stand-to-sit;bed-to-chair/chair-to-bed  -KG     [place] Level/Cues Needed (Transfer Goal 1, PT) minimum assist (75% or more patient effort)  -KG     Time Frame (Transfer Goal 1, PT) 2 weeks  -KG     Progress/Outcome (Transfer Goal 1, PT) goal ongoing  -KG       Row Name 08/12/23 1418          Gait Training Goal 1 (PT)    Activity/Assistive Device (Gait Training Goal 1, PT) gait (walking locomotion);assistive device use;walker, rolling  -KG     [place] Level (Gait Training Goal 1, PT) minimum assist (75% or more patient effort)  -KG     Distance (Gait Training Goal 1, PT) 50 feet  -KG     Time Frame (Gait Training Goal 1, PT) 2 weeks  -KG     Progress/Outcome (Gait Training Goal 1, PT) goal ongoing  -KG       Row Name 08/12/23 1418          Therapy Assessment/Plan (PT)    Planned Therapy Interventions (PT) balance training;bed mobility training;gait training;strengthening;transfer training  -KG               User Key  (r) = Recorded By, (t) = Taken By, (c) = Cosigned By      Initials Name Provider Type    KG Chelsea Contreras N, PT Physical Therapist                   Clinical Impression       Row Name 08/12/23 1415          Pain    Additional Documentation Pain Scale: FACES Pre/Post-Treatment (Group)  -KG       Row Name 08/12/23 1415          Pain Scale: FACES Pre/Post-Treatment    Pain: FACES Scale, Pretreatment 6-->hurts even more  -KG     Posttreatment Pain Rating 8-->hurts whole lot  -KG     Pain Location incisional  -KG     Pain Location - chest  -KG       Row Name 08/12/23 1415          Plan of Care Review    Plan of Care Reviewed With patient  -KG     Outcome Evaluation PT initial evaluation completed for pt s/p CABG x4 presenting with generalized weakness, confusion, impaired balance and coordination, increased incisional pain, and decreased functional mobility. Pt ambulated 15ft with modA x2 +1 and B UE support on tripod monitor. Pt's decreased independence warrants PT  skilled care and D/C to SNF.  -KG       Row Name 08/12/23 1415          Therapy Assessment/Plan (PT)    Patient/Family Therapy Goals Statement (PT) return to PLOF  -KG     Rehab Potential (PT) good, to achieve stated therapy goals  -KG     Criteria for Skilled Interventions Met (PT) yes;skilled treatment is necessary  -KG     Therapy Frequency (PT) daily  -KG       Row Name 08/12/23 1415          Vital Signs    Pre Systolic BP Rehab 101  -KG     Pre Treatment Diastolic BP 70  -KG     Post Systolic BP Rehab 146  -KG     Post Treatment Diastolic BP 78  -KG     Pretreatment Heart Rate (beats/min) 94  -KG     Posttreatment Heart Rate (beats/min) 103  -KG     Pre SpO2 (%) 96  -KG     O2 Delivery Pre Treatment supplemental O2  -KG     Post SpO2 (%) 93  -KG     O2 Delivery Post Treatment supplemental O2  -KG     Pre Patient Position Sitting  -KG     Intra Patient Position Standing  -KG     Post Patient Position Sitting  -KG       Row Name 08/12/23 1415          Positioning and Restraints    Pre-Treatment Position sitting in chair/recliner  -KG     Post Treatment Position chair  -KG     In Chair reclined;call light within reach;encouraged to call for assist;with family/caregiver;with nsg;RUE elevated;LUE elevated;legs elevated  -KG               User Key  (r) = Recorded By, (t) = Taken By, (c) = Cosigned By      Initials Name Provider Type    KG Chelsea Contreras, PT Physical Therapist                   Outcome Measures       Row Name 08/12/23 1418          How much help from another person do you currently need...    Turning from your back to your side while in flat bed without using bedrails? 2  -KG     Moving from lying on back to sitting on the side of a flat bed without bedrails? 2  -KG     Moving to and from a bed to a chair (including a wheelchair)? 2  -KG     Standing up from a chair using your arms (e.g., wheelchair, bedside chair)? 2  -KG     Climbing 3-5 steps with a railing? 1  -KG     To walk in hospital  room? 2  -KG     AM-PAC 6 Clicks Score (PT) 11  -KG     Highest level of mobility 4 --> Transferred to chair/commode  -KG       Row Name 08/12/23 1418          Functional Assessment    Outcome Measure Options AM-PAC 6 Clicks Basic Mobility (PT)  -KG               User Key  (r) = Recorded By, (t) = Taken By, (c) = Cosigned By      Initials Name Provider Type    KG Chelsea Contreras, PT Physical Therapist                                 Physical Therapy Education       Title: PT OT SLP Therapies (In Progress)       Topic: Physical Therapy (In Progress)       Point: Mobility training (In Progress)       Learning Progress Summary             Patient Acceptance, E, NR by KG at 8/12/2023 0936                         Point: Home exercise program (Not Started)       Learner Progress:  Not documented in this visit.              Point: Body mechanics (In Progress)       Learning Progress Summary             Patient Acceptance, E, NR by KG at 8/12/2023 0936                         Point: Precautions (In Progress)       Learning Progress Summary             Patient Acceptance, E, NR by KG at 8/12/2023 0936                                         User Key       Initials Effective Dates Name Provider Type Discipline    KG 05/22/20 -  Chelsea Contreras PT Physical Therapist PT                  PT Recommendation and Plan  Planned Therapy Interventions (PT): balance training, bed mobility training, gait training, strengthening, transfer training  Plan of Care Reviewed With: patient  Outcome Evaluation: PT initial evaluation completed for pt s/p CABG x4 presenting with generalized weakness, confusion, impaired balance and coordination, increased incisional pain, and decreased functional mobility. Pt ambulated 15ft with modA x2 +1 and B UE support on tripod monitor. Pt's decreased independence warrants PT skilled care and D/C to SNF.     Time Calculation:   PT Evaluation Complexity  History, PT Evaluation Complexity: 3 or  more personal factors and/or comorbidities  Examination of Body Systems (PT Eval Complexity): total of 3 or more elements  Clinical Presentation (PT Evaluation Complexity): evolving  Clinical Decision Making (PT Evaluation Complexity): moderate complexity  Overall Complexity (PT Evaluation Complexity): moderate complexity     PT Charges       Row Name 08/12/23 0936             Time Calculation    Start Time 0936  -KG      PT Received On 08/12/23  -KG      PT Goal Re-Cert Due Date 08/22/23  -KG         Untimed Charges    PT Eval/Re-eval Minutes 50  -KG         Total Minutes    Untimed Charges Total Minutes 50  -KG       Total Minutes 50  -KG                User Key  (r) = Recorded By, (t) = Taken By, (c) = Cosigned By      Initials Name Provider Type    KG Chelsea Contreras, PT Physical Therapist                  Therapy Charges for Today       Code Description Service Date Service Provider Modifiers Qty    80642682002 HC PT EVAL MOD COMPLEXITY 4 8/12/2023 Chelsea Contreras, PT GP 1    80166273993 HC PT THER SUPP EA 15 MIN 8/12/2023 Chelsea Contreras, PT GP 3            PT G-Codes  Outcome Measure Options: AM-PAC 6 Clicks Basic Mobility (PT)  AM-PAC 6 Clicks Score (PT): 11  PT Discharge Summary  Anticipated Discharge Disposition (PT): skilled nursing facility    Elana Contreras PT  8/12/2023

## 2023-08-12 NOTE — PLAN OF CARE
Goal Outcome Evaluation:  Plan of Care Reviewed With: patient           Outcome Evaluation: PT initial evaluation completed for pt s/p CABG x4 presenting with generalized weakness, confusion, impaired balance and coordination, increased incisional pain, and decreased functional mobility. Pt ambulated 15ft with modA x2 +1 and B UE support on tripod monitor. Pt's decreased independence warrants PT skilled care and D/C to SNF.      Anticipated Discharge Disposition (PT): skilled nursing facility

## 2023-08-12 NOTE — PROGRESS NOTES
[facility] Cardiology  Progress       Reason for visit:    Follow-up HTN, HLD, MVCAD      Active Hospital Problems    Diagnosis  POA    T2DM [E11.9]  Yes    HTN (hypertension) [I10]  Yes    Former smoker [Z87.891]  Not Applicable    Vapes nicotine containing substance [Z72.0]  Yes    GERD [K21.9]  Yes    MV CAD [I25.10]  Yes     S/P multiple stents              Subjective:   Patient sitting up in chair this morning. Nitroglycerin infusion has been turned off. Denies chest pain, pressure, tightness, or shortness of breath.            Vital Sign Min/Max for last 24 hours  Temp  Min: 95.9 øF (35.5 øC)  Max: 100.2 øF (37.9 øC)   BP  Min: 92/55  Max: 128/78   Pulse  Min: 71  Max: 105   Resp  Min: 16  Max: 32   SpO2  Min: 91 %  Max: 100 %   Flow (L/min)  Min: 2  Max: 6      Intake/Output Summary (Last 24 hours) at 8/12/2023 1200  Last data filed at 8/12/2023 1012  Gross per 24 hour   Intake 2358.97 ml   Output 2670 ml   Net -311.03 ml           Physical Exam  Vitals and nursing note reviewed.   Cardiovascular:      Rate and Rhythm: Normal rate and regular rhythm.      Pulses: Normal pulses.      Heart sounds:     Friction rub present.   Pulmonary:      Effort: Pulmonary effort is normal.      Breath sounds: Decreased air movement present.   Chest:      Chest wall: No crepitus.      Comments: S/p CABG  Musculoskeletal:      Right lower leg: No edema.      Left lower leg: No edema.   Skin:     General: Skin is warm and dry.   Neurological:      Mental Status: He is alert.       Tele:  Sinus tachycardia    Results Review (reviewed the patient's recent labs in the electronic medical record):     EKG (8/12/2023):     Vent. Rate :  99 BPM     Atrial Rate :  99 BPM     P-R Int : 170 ms          QRS Dur : 104 ms      QT Int : 374 ms       P-R-T Axes :  40 -12 -14 degrees     QTc Int : 479 ms     Normal sinus rhythm  Incomplete right bundle branch block  Inferior infarct , age undetermined  Abnormal  ECG    CXR (8/12/2023):   Postsurgical chest with similar trace bilateral pleural effusions and bibasilar atelectasis after extubation.          Results from last 7 days   Lab Units 08/12/23  0219 08/11/23  1517 08/11/23  1203 08/11/23  0309 08/10/23  0104   SODIUM mmol/L 139 142 140 140 140   POTASSIUM mmol/L 4.2 3.8  3.8 3.7 4.0 4.1   CHLORIDE mmol/L 101 104 103 101 102   BUN mg/dL 14 14 12 13 16   CREATININE mg/dL 1.23 1.07 1.07 0.98 0.98   MAGNESIUM mg/dL 2.0 2.3 2.4  --   --          Results from last 7 days   Lab Units 08/12/23  0219 08/11/23  1517 08/11/23  1126   WBC 10*3/mm3 11.41* 12.10* 10.20   HEMOGLOBIN g/dL 10.0* 10.7* 11.0*   HEMATOCRIT % 28.8* 30.1* 31.4*   PLATELETS 10*3/mm3 160 160 141       Lab Results   Component Value Date    HGBA1C 7.10 (H) 08/10/2023       Lab Results   Component Value Date    CHOL 192 08/10/2023    TRIG 474 (H) 08/10/2023    HDL 29 (L) 08/10/2023    LDL 86 08/10/2023              1. MVCAD  - normal EF pre-op  - CABG x4 with Dr. Louise 8/11 (LIMA-LAD, SVG to diagonal, OM and RCA)  - PO clip with #35 Atricure clip  - continue ASA, statin        2. Hypertension   - On Nitro gtt immediately post-op. Stopped at 1100 8/12.  - BB has been resumed. BP well-controlled, currently 126/69   - consider ACE/ARB if needed as next agent      3. Hyperlipidemia   - LDL 86, Trig 474  - on Lipitor 40mg     4. DM2  - A1C 7.1%  - per intensivists             JANIE Solo    Electronically signed by JANIE Solo, 08/12/23, 12:00 PM EDT.

## 2023-08-12 NOTE — PROGRESS NOTES
Cardiothoracic Surgery Progress Note      POD # 1 s/p CABG x 4     LOS: 2 days      Subjective:  No complaints    Objective:  Vital Signs  Temp:  [99 øF (37.2 øC)-100.2 øF (37.9 øC)] 99 øF (37.2 øC)  Heart Rate:  [] 128  Resp:  [14-32] 16  BP: ()/(57-78) 111/68    Physical Exam:   General Appearance: alert, appears stated age and cooperative   Lungs: clear to auscultation, respirations regular, respirations even, and respirations unlabored   Heart: regular rhythm & normal rate, normal S1, S2, and no murmur, no gallop, no rub   Skin: Incision c/d/i     Results:    Results from last 7 days   Lab Units 08/12/23  0219   WBC 10*3/mm3 11.41*   HEMOGLOBIN g/dL 10.0*   HEMATOCRIT % 28.8*   PLATELETS 10*3/mm3 160     Results from last 7 days   Lab Units 08/12/23  0219   SODIUM mmol/L 139   POTASSIUM mmol/L 4.2   CHLORIDE mmol/L 101   CO2 mmol/L 25.0   BUN mg/dL 14   CREATININE mg/dL 1.23   GLUCOSE mg/dL 183*   CALCIUM mg/dL 9.1       Assessment:  POD # 1 s/p CABG x 4    Plan:  Continue chest tubes  Nitroglycerin drip weaned off  Ambulate  Pulmonary toilet  ASA, statin, BB    Rex Cade MD  08/12/23  19:33 EDT

## 2023-08-12 NOTE — PROGRESS NOTES
Critical Care Note     LOS: 2 days   Patient Care Team:  Provider, No Known as PCP - General    Chief Complaint/Reason for visit:      CABG x4, August 11, 2023  Diabetes mellitus type 2  Hypertension  GERD  History of right AKA    Subjective       Interval History:     Patient extubated at 1535 and remains on nasal cannula 6 L.  Low-grade temperature 100 degrees.  Chest tube drainage since 7 PM only 40 mL, total output yesterday 910 mL.  Urine output yesterday 4 L.    Review of Systems:    All systems were reviewed and negative except as noted in subjective.    Medical history, surgical history, social history, family history reviewed    Objective     Intake/Output:    Intake/Output Summary (Last 24 hours) at 8/12/2023 1203  Last data filed at 8/12/2023 1012  Gross per 24 hour   Intake 2358.97 ml   Output 2670 ml   Net -311.03 ml       Nutrition:  Diet: Liquid Diets; Clear Liquid; Texture: Regular Texture (IDDSI 7); Fluid Consistency: Thin (IDDSI 0)    Infusions:  dexmedetomidine, 0.2-1.5 mcg/kg/hr, Last Rate: Stopped (08/11/23 1509)  dextrose 5 % with KCl 20 mEq, 30 mL/hr, Last Rate: 30 mL/hr (08/11/23 1128)  DOBUTamine, 2-20 mcg/kg/min  DOPamine, 2-20 mcg/kg/min  EPINEPHrine, 0.02-0.3 mcg/kg/min  insulin, 0-100 Units/hr, Last Rate: 3.3 Units/hr (08/12/23 0800)  niCARdipine, 5-15 mg/hr  niCARdipine, 5-15 mg/hr  nitroglycerin, 10-50 mcg/min, Last Rate: 40 mcg/min (08/12/23 0110)  nitroglycerin, 5-200 mcg/min, Last Rate: Stopped (08/11/23 1704)  norepinephrine, 0.02-0.3 mcg/kg/min, Last Rate: Stopped (08/11/23 2036)  phenylephrine, 0.5-3 mcg/kg/min  propofol, 5-50 mcg/kg/min, Last Rate: Stopped (08/11/23 1415)        Mechanical Ventilator Settings:            Resp Rate (Set): 14  Pressure Support (cm H2O): 10 cm H20  FiO2 (%): 40 %  PEEP/CPAP (cm H2O): 5 cm H20    Minute Ventilation (L/min) (Obs): 4.48 L/min  Resp Rate (Observed) Vent: 17  I:E Ratio (Set): 1:3.09  I:E Ratio (Obs): 14.29:1    PIP Observed (cm H2O): 17  "cm H2O       Telemetry: Sinus rhythm             Vital Signs  Blood pressure 126/69, pulse 101, temperature 100 øF (37.8 øC), temperature source Bladder, resp. rate (!) 32, height 180.3 cm (71\"), weight 89.4 kg (197 lb), SpO2 92 %.    Physical Exam:  General Appearance:  Older gentleman upright in the chair, in no distress   Head:  Alopecia   Eyes:          Pupils equal, reactive, no jaundice, conjunctiva pink   Ears:     Throat: Oral mucosa moist   Neck: Right IJ line, trachea midline, no crepitus   Back:      Lungs:   Sternotomy incision intact.  Mediastinal tubes with bloody drainage.  Breath sounds are bilateral without wheeze or rhonchi    Heart:  Regular rhythm, S1, S2 present, soft rub   Abdomen:   active bowel sounds, soft   Rectal:   Deferred   Extremities: Left lower extremity Ace wrap, toes with good capillary refill, right leg AKA, prosthesis in place   Pulses:    Skin: Warm and dry without rash   Lymph nodes: No cervical adenopathy   Neurologic: Alert, oriented, speech fluent, face symmetric,  equal      Results Review:     I reviewed the patient's new clinical results.   Results from last 7 days   Lab Units 08/12/23 0219 08/11/23  1517 08/11/23  1203 08/11/23  0309 08/10/23  0104   SODIUM mmol/L 139 142 140   < > 140   POTASSIUM mmol/L 4.2 3.8  3.8 3.7   < > 4.1   CHLORIDE mmol/L 101 104 103   < > 102   CO2 mmol/L 25.0 25.0 23.0   < > 29.0   BUN mg/dL 14 14 12   < > 16   CREATININE mg/dL 1.23 1.07 1.07   < > 0.98   CALCIUM mg/dL 9.1 9.3 9.6   < > 9.6   BILIRUBIN mg/dL 0.7  --   --   --  0.3   ALK PHOS U/L 39  --   --   --  58   ALT (SGPT) U/L 32  --   --   --  40   AST (SGOT) U/L 66*  --   --   --  27   GLUCOSE mg/dL 183* 136* 142*   < > 171*    < > = values in this interval not displayed.     Results from last 7 days   Lab Units 08/12/23 0219 08/11/23  1517 08/11/23  1126   WBC 10*3/mm3 11.41* 12.10* 10.20   HEMOGLOBIN g/dL 10.0* 10.7* 11.0*   HEMATOCRIT % 28.8* 30.1* 31.4*   PLATELETS " 10*3/mm3 160 160 141     Results from last 7 days   Lab Units 08/11/23  1530   PH, ARTERIAL pH units 7.360   PO2 ART mm Hg 91.4   PCO2, ARTERIAL mm Hg 42.6   HCO3 ART mmol/L 24.0     No results found for: BLOODCX  No results found for: URINECX    I reviewed the patient's new imaging including images and reports.    Findings:  Sternotomy. Extubation and removal of nasogastric tube. Unchanged position of right IJ catheter, mediastinal drains, and chest tubes. Cardiomediastinal silhouette is unchanged. Low lung volumes with bibasilar atelectasis, similar. Similar trace bilateral   pleural fluid. No measurable pneumothorax. Bones are unchanged.   Impression:     Impression:  Postsurgical chest with similar trace bilateral pleural effusions and bibasilar atelectasis after extubation.    Electronically Signed: Bryan Silva MD   8/12/2023 8:10 AM EDT       All medications reviewed.   acetaminophen, 650 mg, Oral, Q8H  aspirin, 325 mg, Oral, Daily  atorvastatin, 40 mg, Oral, Nightly  carvedilol, 12.5 mg, Oral, BID With Meals  ceFAZolin, 2 g, Intravenous, Q8H  chlorhexidine, 15 mL, Mouth/Throat, Q12H  gabapentin, 300 mg, Oral, Q12H  pantoprazole, 40 mg, Oral, Q AM  pharmacy consult - [place], , Does not apply, Daily  senna-docusate sodium, 2 tablet, Oral, BID  sodium chloride, 10 mL, Intravenous, Q12H          Assessment & Plan       MV CAD    T2DM    HTN (hypertension)    Former smoker    Vapes nicotine containing substance    GERD      73-year-old gentleman with diabetes, hypertension, nicotine addiction undergoing CABG x4, left atrial appendage ligation, LIMA to LAD, saphenous vein to diagonal and LAD, saphenous vein to OM, saphenous vein to RCA.  He did receive 2 units of packed red cells and surgery.  Preoperative echocardiogram revealed an EF of 70% with normal valve function.  He extubated after surgery and is currently on 6 L.  This morning he did desaturate moving from the bed to the chair but quickly came up into the  90s.  He remains in sinus rhythm      His hemoglobin A1c is 7.1.  He is not on any diabetic medications according to his outpatient medication record.  Per protocol insulin drip is at 3.3 units/h.  Diabetes educator consulted    Lipid panel revealed triglycerides of 474 with a total cholesterol 192.  He was on omega-3 fatty acids as an outpatient.  He is currently on a statin.    Preoperative FEV1 was normal at 93% despite previous tobacco use and ongoing vaping.  No bronchospasm on examination.      PLAN:    Transition insulin to long-acting and sliding scale  Diabetes educator    Mobilize with physical therapy  Incentive spirometry  Restart nebulized bronchodilators for atelectasis    Aspirin, statin, carvedilol      VTE Prophylaxis: foot pumps    Stress Ulcer Prophylaxis: Protonix    Marni Gutierrez MD  Pulmonary and critical care    Time: Critical care 25 min  I personally provided care to this critically ill patient as documented above.  Critical care time does not include time spent on separately billed procedures.  None of my critical care time was concurrent with other critical care providers.

## 2023-08-13 ENCOUNTER — APPOINTMENT (OUTPATIENT)
Dept: GENERAL RADIOLOGY | Facility: HOSPITAL | Age: 73
DRG: 236 | End: 2023-08-13
Payer: OTHER GOVERNMENT

## 2023-08-13 LAB
ANION GAP SERPL CALCULATED.3IONS-SCNC: 12 MMOL/L (ref 5–15)
BH BB BLOOD EXPIRATION DATE: NORMAL
BH BB BLOOD TYPE BARCODE: 6200
BH BB DISPENSE STATUS: NORMAL
BH BB PRODUCT CODE: NORMAL
BH BB UNIT NUMBER: NORMAL
BUN SERPL-MCNC: 17 MG/DL (ref 8–23)
BUN/CREAT SERPL: 14.9 (ref 7–25)
CALCIUM SPEC-SCNC: 8.8 MG/DL (ref 8.6–10.5)
CHLORIDE SERPL-SCNC: 100 MMOL/L (ref 98–107)
CO2 SERPL-SCNC: 24 MMOL/L (ref 22–29)
CREAT SERPL-MCNC: 1.14 MG/DL (ref 0.76–1.27)
CROSSMATCH INTERPRETATION: NORMAL
DEPRECATED RDW RBC AUTO: 46.1 FL (ref 37–54)
EGFRCR SERPLBLD CKD-EPI 2021: 68.3 ML/MIN/1.73
ERYTHROCYTE [DISTWIDTH] IN BLOOD BY AUTOMATED COUNT: 13.6 % (ref 12.3–15.4)
GLUCOSE BLDC GLUCOMTR-MCNC: 154 MG/DL (ref 70–130)
GLUCOSE BLDC GLUCOMTR-MCNC: 156 MG/DL (ref 70–130)
GLUCOSE BLDC GLUCOMTR-MCNC: 167 MG/DL (ref 70–130)
GLUCOSE BLDC GLUCOMTR-MCNC: 173 MG/DL (ref 70–130)
GLUCOSE SERPL-MCNC: 158 MG/DL (ref 65–99)
HCT VFR BLD AUTO: 27.1 % (ref 37.5–51)
HGB BLD-MCNC: 9.4 G/DL (ref 13–17.7)
MCH RBC QN AUTO: 32.2 PG (ref 26.6–33)
MCHC RBC AUTO-ENTMCNC: 34.7 G/DL (ref 31.5–35.7)
MCV RBC AUTO: 92.8 FL (ref 79–97)
PLATELET # BLD AUTO: 155 10*3/MM3 (ref 140–450)
PMV BLD AUTO: 10 FL (ref 6–12)
POTASSIUM SERPL-SCNC: 3.8 MMOL/L (ref 3.5–5.2)
POTASSIUM SERPL-SCNC: 4 MMOL/L (ref 3.5–5.2)
RBC # BLD AUTO: 2.92 10*6/MM3 (ref 4.14–5.8)
SODIUM SERPL-SCNC: 136 MMOL/L (ref 136–145)
UNIT  ABO: NORMAL
UNIT  RH: NORMAL
WBC NRBC COR # BLD: 12.95 10*3/MM3 (ref 3.4–10.8)

## 2023-08-13 PROCEDURE — 93010 ELECTROCARDIOGRAM REPORT: CPT | Performed by: INTERNAL MEDICINE

## 2023-08-13 PROCEDURE — 94664 DEMO&/EVAL PT USE INHALER: CPT

## 2023-08-13 PROCEDURE — 71045 X-RAY EXAM CHEST 1 VIEW: CPT

## 2023-08-13 PROCEDURE — 25010000002 AMIODARONE IN DEXTROSE 5% 360-4.14 MG/200ML-% SOLUTION: Performed by: NURSE PRACTITIONER

## 2023-08-13 PROCEDURE — 63710000001 INSULIN LISPRO (HUMAN) PER 5 UNITS: Performed by: INTERNAL MEDICINE

## 2023-08-13 PROCEDURE — 0 POTASSIUM CHLORIDE PER 2 MEQ: Performed by: THORACIC SURGERY (CARDIOTHORACIC VASCULAR SURGERY)

## 2023-08-13 PROCEDURE — 25010000002 FUROSEMIDE PER 20 MG: Performed by: NURSE PRACTITIONER

## 2023-08-13 PROCEDURE — 94761 N-INVAS EAR/PLS OXIMETRY MLT: CPT

## 2023-08-13 PROCEDURE — 63710000001 INSULIN DETEMIR PER 5 UNITS: Performed by: INTERNAL MEDICINE

## 2023-08-13 PROCEDURE — 99233 SBSQ HOSP IP/OBS HIGH 50: CPT | Performed by: INTERNAL MEDICINE

## 2023-08-13 PROCEDURE — 99232 SBSQ HOSP IP/OBS MODERATE 35: CPT | Performed by: NURSE PRACTITIONER

## 2023-08-13 PROCEDURE — 82948 REAGENT STRIP/BLOOD GLUCOSE: CPT

## 2023-08-13 PROCEDURE — 84132 ASSAY OF SERUM POTASSIUM: CPT | Performed by: THORACIC SURGERY (CARDIOTHORACIC VASCULAR SURGERY)

## 2023-08-13 PROCEDURE — 80048 BASIC METABOLIC PNL TOTAL CA: CPT | Performed by: PHYSICIAN ASSISTANT

## 2023-08-13 PROCEDURE — 94799 UNLISTED PULMONARY SVC/PX: CPT

## 2023-08-13 PROCEDURE — 25010000002 CEFAZOLIN IN DEXTROSE 2-4 GM/100ML-% SOLUTION: Performed by: PHYSICIAN ASSISTANT

## 2023-08-13 PROCEDURE — 85027 COMPLETE CBC AUTOMATED: CPT | Performed by: PHYSICIAN ASSISTANT

## 2023-08-13 PROCEDURE — 93005 ELECTROCARDIOGRAM TRACING: CPT | Performed by: PHYSICIAN ASSISTANT

## 2023-08-13 RX ORDER — POTASSIUM CHLORIDE 29.8 MG/ML
20 INJECTION INTRAVENOUS ONCE
Status: COMPLETED | OUTPATIENT
Start: 2023-08-13 | End: 2023-08-13

## 2023-08-13 RX ORDER — FUROSEMIDE 10 MG/ML
40 INJECTION INTRAMUSCULAR; INTRAVENOUS ONCE
Status: COMPLETED | OUTPATIENT
Start: 2023-08-13 | End: 2023-08-13

## 2023-08-13 RX ORDER — BUMETANIDE 0.25 MG/ML
1 INJECTION INTRAMUSCULAR; INTRAVENOUS ONCE
Status: DISCONTINUED | OUTPATIENT
Start: 2023-08-13 | End: 2023-08-13

## 2023-08-13 RX ADMIN — IPRATROPIUM BROMIDE AND ALBUTEROL SULFATE 3 ML: 2.5; .5 SOLUTION RESPIRATORY (INHALATION) at 12:01

## 2023-08-13 RX ADMIN — GABAPENTIN 300 MG: 300 CAPSULE ORAL at 08:23

## 2023-08-13 RX ADMIN — Medication 2 G: at 02:05

## 2023-08-13 RX ADMIN — ACETAMINOPHEN 650 MG: 325 TABLET ORAL at 22:54

## 2023-08-13 RX ADMIN — INSULIN LISPRO 3 UNITS: 100 INJECTION, SOLUTION INTRAVENOUS; SUBCUTANEOUS at 17:24

## 2023-08-13 RX ADMIN — ATORVASTATIN CALCIUM 40 MG: 40 TABLET, FILM COATED ORAL at 20:26

## 2023-08-13 RX ADMIN — ASPIRIN 325 MG: 325 TABLET, COATED ORAL at 08:23

## 2023-08-13 RX ADMIN — PANTOPRAZOLE SODIUM 40 MG: 40 TABLET, DELAYED RELEASE ORAL at 06:50

## 2023-08-13 RX ADMIN — Medication 10 ML: at 22:55

## 2023-08-13 RX ADMIN — ACETAMINOPHEN 650 MG: 325 TABLET ORAL at 15:03

## 2023-08-13 RX ADMIN — INSULIN LISPRO 3 UNITS: 100 INJECTION, SOLUTION INTRAVENOUS; SUBCUTANEOUS at 20:26

## 2023-08-13 RX ADMIN — AMIODARONE HYDROCHLORIDE 0.5 MG/MIN: 1.8 INJECTION, SOLUTION INTRAVENOUS at 00:23

## 2023-08-13 RX ADMIN — IPRATROPIUM BROMIDE AND ALBUTEROL SULFATE 3 ML: 2.5; .5 SOLUTION RESPIRATORY (INHALATION) at 07:14

## 2023-08-13 RX ADMIN — CHLORHEXIDINE GLUCONATE 15 ML: 1.2 SOLUTION ORAL at 08:23

## 2023-08-13 RX ADMIN — SENNOSIDES AND DOCUSATE SODIUM 2 TABLET: 50; 8.6 TABLET ORAL at 20:26

## 2023-08-13 RX ADMIN — ACETAMINOPHEN 650 MG: 325 TABLET ORAL at 06:50

## 2023-08-13 RX ADMIN — CARVEDILOL 12.5 MG: 12.5 TABLET, FILM COATED ORAL at 08:23

## 2023-08-13 RX ADMIN — OXYCODONE HYDROCHLORIDE 10 MG: 10 TABLET ORAL at 11:37

## 2023-08-13 RX ADMIN — AMIODARONE HYDROCHLORIDE 200 MG: 200 TABLET ORAL at 17:25

## 2023-08-13 RX ADMIN — GABAPENTIN 300 MG: 300 CAPSULE ORAL at 20:26

## 2023-08-13 RX ADMIN — CARVEDILOL 12.5 MG: 12.5 TABLET, FILM COATED ORAL at 17:24

## 2023-08-13 RX ADMIN — INSULIN DETEMIR 15 UNITS: 100 INJECTION, SOLUTION SUBCUTANEOUS at 20:26

## 2023-08-13 RX ADMIN — IPRATROPIUM BROMIDE AND ALBUTEROL SULFATE 3 ML: 2.5; .5 SOLUTION RESPIRATORY (INHALATION) at 16:15

## 2023-08-13 RX ADMIN — IPRATROPIUM BROMIDE AND ALBUTEROL SULFATE 3 ML: 2.5; .5 SOLUTION RESPIRATORY (INHALATION) at 21:56

## 2023-08-13 RX ADMIN — INSULIN LISPRO 3 UNITS: 100 INJECTION, SOLUTION INTRAVENOUS; SUBCUTANEOUS at 08:23

## 2023-08-13 RX ADMIN — SENNOSIDES AND DOCUSATE SODIUM 2 TABLET: 50; 8.6 TABLET ORAL at 08:32

## 2023-08-13 RX ADMIN — POTASSIUM CHLORIDE 20 MEQ: 29.8 INJECTION, SOLUTION INTRAVENOUS at 04:28

## 2023-08-13 RX ADMIN — INSULIN LISPRO 3 UNITS: 100 INJECTION, SOLUTION INTRAVENOUS; SUBCUTANEOUS at 12:14

## 2023-08-13 RX ADMIN — FUROSEMIDE 40 MG: 10 INJECTION, SOLUTION INTRAMUSCULAR; INTRAVENOUS at 10:31

## 2023-08-13 RX ADMIN — AMIODARONE HYDROCHLORIDE 0.5 MG/MIN: 1.8 INJECTION, SOLUTION INTRAVENOUS at 12:14

## 2023-08-13 RX ADMIN — Medication 10 ML: at 08:32

## 2023-08-13 NOTE — PROGRESS NOTES
Critical Care Note     LOS: 3 days   Patient Care Team:  Provider, No Known as PCP - General    Chief Complaint/Reason for visit:      CABG x4, August 11, 2023  Diabetes mellitus type 2  Hypertension  GERD  History of right AKA    Subjective       Interval History:     He developed atrial fibrillation overnight and was placed on amiodarone.  He converted to sinus rhythm.  Urine output yesterday 905 mL.  Creatinine improved to 1.14.  He could only walk about 15 feet with therapy yesterday.  Chest tube at 280 mL out in the last 24 hours.    Review of Systems:    All systems were reviewed and negative except as noted in subjective.    Medical history, surgical history, social history, family history reviewed    Objective     Intake/Output:    Intake/Output Summary (Last 24 hours) at 8/13/2023 1038  Last data filed at 8/13/2023 0800  Gross per 24 hour   Intake 2008.56 ml   Output 1105 ml   Net 903.56 ml       Nutrition:  Diet: Regular/House Diet, Cardiac Diets; Healthy Heart (2-3 Na+); Texture: Regular Texture (IDDSI 7); Fluid Consistency: Thin (IDDSI 0)    Infusions:  amiodarone, 0.5 mg/min, Last Rate: 0.5 mg/min (08/13/23 0023)  dexmedetomidine, 0.2-1.5 mcg/kg/hr, Last Rate: Stopped (08/11/23 1509)  dextrose 5 % with KCl 20 mEq, 30 mL/hr, Last Rate: 30 mL/hr (08/11/23 1128)  DOBUTamine, 2-20 mcg/kg/min  DOPamine, 2-20 mcg/kg/min  niCARdipine, 5-15 mg/hr  niCARdipine, 5-15 mg/hr  nitroglycerin, 10-50 mcg/min, Last Rate: 40 mcg/min (08/12/23 0110)  nitroglycerin, 5-200 mcg/min, Last Rate: Stopped (08/11/23 1704)  norepinephrine, 0.02-0.3 mcg/kg/min, Last Rate: Stopped (08/13/23 0311)  phenylephrine, 0.5-3 mcg/kg/min        Mechanical Ventilator Settings:            Resp Rate (Set): 14  Pressure Support (cm H2O): 10 cm H20  FiO2 (%): 40 %  PEEP/CPAP (cm H2O): 5 cm H20    Minute Ventilation (L/min) (Obs): 4.48 L/min  Resp Rate (Observed) Vent: 17  I:E Ratio (Set): 1:3.09  I:E Ratio (Obs): 14.29:1    PIP Observed (cm H2O):  "17 cm H2O       Telemetry: Sinus rhythm             Vital Signs  Blood pressure 103/58, pulse 83, temperature 100 øF (37.8 øC), temperature source Bladder, resp. rate 20, height 180.3 cm (71\"), weight 89.4 kg (197 lb), SpO2 96 %.    Physical Exam:  General Appearance:  Older gentleman supine in bed sleeping   Head:  Alopecia   Eyes:             Ears:     Throat:    Neck: Right IJ line, trachea midline, no crepitus   Back:      Lungs:   Sternotomy incision intact.  Mediastinal tubes with bloody drainage.  Breath sounds are bilateral without wheeze or rhonchi    Heart:  Regular rhythm, S1, S2 present, soft rub   Abdomen:   active bowel sounds, soft   Rectal:   Deferred   Extremities: Left lower extremity Ace wrap, pitting edema left lower extremity.  Toes with good capillary refill, right leg AKA, prosthesis in place   Pulses:    Skin: Warm and dry without rash   Lymph nodes: No cervical adenopathy   Neurologic: Alert, oriented, speech fluent, face symmetric,  equal      Results Review:     I reviewed the patient's new clinical results.   Results from last 7 days   Lab Units 08/13/23  0920 08/13/23  0310 08/12/23  0219 08/11/23  1517 08/11/23  0309 08/10/23  0104   SODIUM mmol/L  --  136 139 142   < > 140   POTASSIUM mmol/L 4.0 3.8 4.2 3.8  3.8   < > 4.1   CHLORIDE mmol/L  --  100 101 104   < > 102   CO2 mmol/L  --  24.0 25.0 25.0   < > 29.0   BUN mg/dL  --  17 14 14   < > 16   CREATININE mg/dL  --  1.14 1.23 1.07   < > 0.98   CALCIUM mg/dL  --  8.8 9.1 9.3   < > 9.6   BILIRUBIN mg/dL  --   --  0.7  --   --  0.3   ALK PHOS U/L  --   --  39  --   --  58   ALT (SGPT) U/L  --   --  32  --   --  40   AST (SGOT) U/L  --   --  66*  --   --  27   GLUCOSE mg/dL  --  158* 183* 136*   < > 171*    < > = values in this interval not displayed.     Results from last 7 days   Lab Units 08/13/23  0310 08/12/23  0219 08/11/23  1517   WBC 10*3/mm3 12.95* 11.41* 12.10*   HEMOGLOBIN g/dL 9.4* 10.0* 10.7*   HEMATOCRIT % 27.1* 28.8* " 30.1*   PLATELETS 10*3/mm3 155 160 160     Results from last 7 days   Lab Units 08/11/23  1530   PH, ARTERIAL pH units 7.360   PO2 ART mm Hg 91.4   PCO2, ARTERIAL mm Hg 42.6   HCO3 ART mmol/L 24.0     No results found for: BLOODCX  No results found for: URINECX    I reviewed the patient's new imaging including images and reports.    Findings:  Postoperative changes are noted. The left chest tube and mediastinal drains are stable. Atelectasis is noted within the lung bases. The right IJ venous sheath is in place. A central line extends to the SVC. There is no evidence for pneumothorax.   Impression:     Impression:  1.Postoperative changes are noted. The left chest tube and mediastinal drains are stable in position.  2.Mild atelectasis within the lung bases.  3.Stable positioning of the right IJ venous sheath and central line.      Electronically Signed: Pako Ojeda   8/13/2023 8:32 AM EDT       All medications reviewed.   acetaminophen, 650 mg, Oral, Q8H  amiodarone, 200 mg, Oral, Once   Followed by  [START ON 8/14/2023] amiodarone, 200 mg, Oral, Q8H   Followed by  [START ON 8/20/2023] amiodarone, 200 mg, Oral, Q12H   Followed by  [START ON 9/3/2023] amiodarone, 200 mg, Oral, Daily  aspirin, 325 mg, Oral, Daily  atorvastatin, 40 mg, Oral, Nightly  carvedilol, 12.5 mg, Oral, BID With Meals  chlorhexidine, 15 mL, Mouth/Throat, Q12H  gabapentin, 300 mg, Oral, Q12H  insulin detemir, 15 Units, Subcutaneous, Nightly  insulin lispro, 3-14 Units, Subcutaneous, 4x Daily AC & at Bedtime  ipratropium-albuterol, 3 mL, Nebulization, 4x Daily - RT  pantoprazole, 40 mg, Oral, Q AM  pharmacy consult - [place], , Does not apply, Daily  senna-docusate sodium, 2 tablet, Oral, BID  sodium chloride, 10 mL, Intravenous, Q12H          Assessment & Plan       MV CAD    T2DM    HTN (hypertension)    Former smoker    Vapes nicotine containing substance    GERD      73-year-old gentleman with diabetes, hypertension, nicotine addiction  undergoing CABG x4, left atrial appendage ligation, LIMA to LAD, saphenous vein to diagonal and LAD, saphenous vein to OM, saphenous vein to RCA.  He did receive 2 units of packed red cells and surgery.  Preoperative echocardiogram revealed an EF of 70% with normal valve function.  He extubated after surgery and is currently on 4 L.  Chest tube output 280 mL with plans to keep for another 24 hours    He developed atrial fibrillation and was placed on amiodarone yesterday.  He has converted to sinus rhythm.      His hemoglobin A1c is 7.1.  He is not on any diabetic medications according to his outpatient medication record.  Blood glucoses are running 150-220    Lipid panel revealed triglycerides of 474 with a total cholesterol 192.  He was on omega-3 fatty acids as an outpatient.  He is currently on a statin.    Preoperative FEV1 was normal at 93% despite previous tobacco use and ongoing vaping.  No bronchospasm on examination.      PLAN:    Long-acting and sliding scale insulin  Diabetes educator consulted    Mobilize with physical therapy  Incentive spirometry   nebulized bronchodilators for atelectasis    Aspirin, statin, carvedilol  Amiodarone per protocol  Diuresis as renal function allows    VTE Prophylaxis: foot pumps    Stress Ulcer Prophylaxis: Protonix    Marni Gutierrez MD  Pulmonary and critical care    Time: Critical care 25 min  I personally provided care to this critically ill patient as documented above.  Critical care time does not include time spent on separately billed procedures.  None of my critical care time was concurrent with other critical care providers.

## 2023-08-13 NOTE — PROGRESS NOTES
Cardiothoracic Surgery Progress Note      POD # 2 s/p CABG x 4     LOS: 3 days      Subjective:  No complaints    Objective:  Vital Signs  Temp:  [99 øF (37.2 øC)-101.8 øF (38.8 øC)] 100 øF (37.8 øC)  Heart Rate:  [] 82  Resp:  [14-32] 17  BP: ()/(50-78) 111/69    Physical Exam:   General Appearance: alert, appears stated age and cooperative   Lungs: clear to auscultation, respirations regular, respirations even, and respirations unlabored   Heart: regular rhythm & normal rate, normal S1, S2, and no murmur, no gallop, no rub   Skin: Incision c/d/i   Chest tube drainage had 230 mL in 24 hours  Results:    Results from last 7 days   Lab Units 08/13/23  0310   WBC 10*3/mm3 12.95*   HEMOGLOBIN g/dL 9.4*   HEMATOCRIT % 27.1*   PLATELETS 10*3/mm3 155       Results from last 7 days   Lab Units 08/13/23  0310   SODIUM mmol/L 136   POTASSIUM mmol/L 3.8   CHLORIDE mmol/L 100   CO2 mmol/L 24.0   BUN mg/dL 17   CREATININE mg/dL 1.14   GLUCOSE mg/dL 158*   CALCIUM mg/dL 8.8         Assessment:  POD # 2 s/p CABG x 4 and PO clip with #35 AtriCure clip      Plan:  Discontinue chest tubes, wires  Amiodarone for A.fib - back in NSR  D/C coronado  D/C central line  Ambulate  Pulmonary toilet  ASA, statin, BB  Diabetes management    Rex Cade MD  08/13/23  07:56 EDT

## 2023-08-13 NOTE — PLAN OF CARE
Goal Outcome Evaluation:  Plan of Care Reviewed With: patient, spouse      Pt remained A&OX4. Pain controlled with oral pain meds. Ambulated 30ft with walker X2 assist. UOP adequate. FC and chest tubes removed. VSS except required increase of oxygen to 6LNC to keep sats greater than 92%. Amiodarone gtt continues until 1730. Remains in SR with rare PAC and PVC.

## 2023-08-13 NOTE — PROGRESS NOTES
"  [facility] at [facility]   Inpatient Progress Note       LOS: 3 days   Patient Care Team:  Provider, No Known as PCP - General    Chief Complaint:  follow-up dyslipidemia and HTN    Subjective     Interval History:     Patient up in chair.  Complains of typical pleuritic chest discomfort.  Was noted to have atrial fibrillation yesterday with amiodarone protocol initiated.  Now back in sinus rhythm.    Review of Systems:   Pertinent positives noted in history, exam, and assessment. Otherwise reviewed and negative.      Objective     Vitals:  Blood pressure 103/58, pulse 83, temperature 100 øF (37.8 øC), temperature source Bladder, resp. rate 20, height 180.3 cm (71\"), weight 89.4 kg (197 lb), SpO2 96 %.     Intake/Output Summary (Last 24 hours) at 8/13/2023 0954  Last data filed at 8/13/2023 0800  Gross per 24 hour   Intake 2108.56 ml   Output 1185 ml   Net 923.56 ml     Vitals reviewed.   Constitutional:       Appearance: Well-developed.   Neck:      Vascular: No JVD.      Trachea: No tracheal deviation.   Pulmonary:      Effort: Pulmonary effort is normal.      Breath sounds: Normal breath sounds.   Cardiovascular:      Normal rate. Regular rhythm.      Murmurs: There is no murmur.      Biphasic rub.   Edema:     Peripheral edema present.     Feet: 1+ edema of the right foot.  Neurological:      Mental Status: Alert and oriented to person, place, and time.          Results Review:     I reviewed the patient's new clinical results.    Results from last 7 days   Lab Units 08/13/23  0310   WBC 10*3/mm3 12.95*   HEMOGLOBIN g/dL 9.4*   HEMATOCRIT % 27.1*   PLATELETS 10*3/mm3 155     Results from last 7 days   Lab Units 08/13/23  0920 08/13/23  0310 08/12/23  0219   SODIUM mmol/L  --  136 139   POTASSIUM mmol/L 4.0 3.8 4.2   CHLORIDE mmol/L  --  100 101   CO2 mmol/L  --  24.0 25.0   BUN mg/dL  --  17 14   CREATININE mg/dL  --  1.14 1.23   CALCIUM mg/dL  --  8.8 9.1   BILIRUBIN mg/dL  --   --  0.7   ALK " PHOS U/L  --   --  39   ALT (SGPT) U/L  --   --  32   AST (SGOT) U/L  --   --  66*   GLUCOSE mg/dL  --  158* 183*     Results from last 7 days   Lab Units 08/13/23  0920 08/13/23  0310   SODIUM mmol/L  --  136   POTASSIUM mmol/L 4.0 3.8   CHLORIDE mmol/L  --  100   CO2 mmol/L  --  24.0   BUN mg/dL  --  17   CREATININE mg/dL  --  1.14   GLUCOSE mg/dL  --  158*   CALCIUM mg/dL  --  8.8     Results from last 7 days   Lab Units 08/12/23  0219 08/11/23  1126 08/10/23  0104   INR  1.15* 1.50* 1.06     No results found for: TROPONINT      Results from last 7 days   Lab Units 08/10/23  0104   CHOLESTEROL mg/dL 192   TRIGLYCERIDES mg/dL 474*   HDL CHOL mg/dL 29*   LDL CHOL mg/dL 86                 Tele: Sinus rhythm    Assessment:      MV CAD    T2DM    HTN (hypertension)    Former smoker    Vapes nicotine containing substance    GERD      1. MVCAD  - normal EF pre-op  - CABG x4 with Dr. Louise 8/11 (LIMA-LAD, SVG to diagonal, OM and RCA)  - PO clip with #35 Atricure clip  - continue ASA, statin        2. Hypertension   - on BB  - consider ACE/ARB if needed as next agent      3. Hyperlipidemia   - LDL 86, Trig 474  - on Lipitor 40mg     4. DM2  - A1C 7.1%  - per intensivists    5. Fever this AM, per CTS and Intensivist    6. PAF,   - amiodarone protocol initiated  - now in SR    Plan:  Continue current cardiac medications.  We will give Lasix 40 mg IV x1 today.  Defer anticoagulation at this point given brief atrial fibrillation and recent left atrial appendage closure.  Dr. Huggins to resume cardiac care tomorrow.    JANIE Tabares   Dictated utilizing Dragon dictation

## 2023-08-14 LAB
ACT BLD: 125 SECONDS (ref 82–152)
ACT BLD: 389 SECONDS (ref 82–152)
ACT BLD: 480 SECONDS (ref 82–152)
ACT BLD: 510 SECONDS (ref 82–152)
ANION GAP SERPL CALCULATED.3IONS-SCNC: 8 MMOL/L (ref 5–15)
BUN SERPL-MCNC: 22 MG/DL (ref 8–23)
BUN/CREAT SERPL: 19 (ref 7–25)
CALCIUM SPEC-SCNC: 9.2 MG/DL (ref 8.6–10.5)
CHLORIDE SERPL-SCNC: 98 MMOL/L (ref 98–107)
CO2 SERPL-SCNC: 28 MMOL/L (ref 22–29)
CREAT SERPL-MCNC: 1.16 MG/DL (ref 0.76–1.27)
DEPRECATED RDW RBC AUTO: 46.8 FL (ref 37–54)
EGFRCR SERPLBLD CKD-EPI 2021: 66.9 ML/MIN/1.73
ERYTHROCYTE [DISTWIDTH] IN BLOOD BY AUTOMATED COUNT: 13.5 % (ref 12.3–15.4)
GLUCOSE BLDC GLUCOMTR-MCNC: 137 MG/DL (ref 70–130)
GLUCOSE BLDC GLUCOMTR-MCNC: 145 MG/DL (ref 70–130)
GLUCOSE BLDC GLUCOMTR-MCNC: 149 MG/DL (ref 70–130)
GLUCOSE BLDC GLUCOMTR-MCNC: 159 MG/DL (ref 70–130)
GLUCOSE SERPL-MCNC: 141 MG/DL (ref 65–99)
HCT VFR BLD AUTO: 26.2 % (ref 37.5–51)
HGB BLD-MCNC: 8.6 G/DL (ref 13–17.7)
MCH RBC QN AUTO: 31.6 PG (ref 26.6–33)
MCHC RBC AUTO-ENTMCNC: 32.8 G/DL (ref 31.5–35.7)
MCV RBC AUTO: 96.3 FL (ref 79–97)
PLATELET # BLD AUTO: 126 10*3/MM3 (ref 140–450)
PMV BLD AUTO: 10.2 FL (ref 6–12)
POTASSIUM SERPL-SCNC: 3.8 MMOL/L (ref 3.5–5.2)
POTASSIUM SERPL-SCNC: 3.9 MMOL/L (ref 3.5–5.2)
QT INTERVAL: 406 MS
QT INTERVAL: 436 MS
QTC INTERVAL: 477 MS
QTC INTERVAL: 485 MS
RBC # BLD AUTO: 2.72 10*6/MM3 (ref 4.14–5.8)
SODIUM SERPL-SCNC: 134 MMOL/L (ref 136–145)
WBC NRBC COR # BLD: 10.07 10*3/MM3 (ref 3.4–10.8)

## 2023-08-14 PROCEDURE — 97530 THERAPEUTIC ACTIVITIES: CPT

## 2023-08-14 PROCEDURE — 0 POTASSIUM CHLORIDE PER 2 MEQ: Performed by: THORACIC SURGERY (CARDIOTHORACIC VASCULAR SURGERY)

## 2023-08-14 PROCEDURE — 84132 ASSAY OF SERUM POTASSIUM: CPT | Performed by: THORACIC SURGERY (CARDIOTHORACIC VASCULAR SURGERY)

## 2023-08-14 PROCEDURE — 63710000001 INSULIN LISPRO (HUMAN) PER 5 UNITS: Performed by: STUDENT IN AN ORGANIZED HEALTH CARE EDUCATION/TRAINING PROGRAM

## 2023-08-14 PROCEDURE — 82948 REAGENT STRIP/BLOOD GLUCOSE: CPT

## 2023-08-14 PROCEDURE — 85027 COMPLETE CBC AUTOMATED: CPT | Performed by: PHYSICIAN ASSISTANT

## 2023-08-14 PROCEDURE — 94799 UNLISTED PULMONARY SVC/PX: CPT

## 2023-08-14 PROCEDURE — 63710000001 INSULIN DETEMIR PER 5 UNITS: Performed by: STUDENT IN AN ORGANIZED HEALTH CARE EDUCATION/TRAINING PROGRAM

## 2023-08-14 PROCEDURE — 99232 SBSQ HOSP IP/OBS MODERATE 35: CPT | Performed by: PHYSICIAN ASSISTANT

## 2023-08-14 PROCEDURE — 99024 POSTOP FOLLOW-UP VISIT: CPT | Performed by: THORACIC SURGERY (CARDIOTHORACIC VASCULAR SURGERY)

## 2023-08-14 PROCEDURE — 93005 ELECTROCARDIOGRAM TRACING: CPT | Performed by: NURSE PRACTITIONER

## 2023-08-14 PROCEDURE — 94761 N-INVAS EAR/PLS OXIMETRY MLT: CPT

## 2023-08-14 PROCEDURE — 80048 BASIC METABOLIC PNL TOTAL CA: CPT | Performed by: PHYSICIAN ASSISTANT

## 2023-08-14 PROCEDURE — 94664 DEMO&/EVAL PT USE INHALER: CPT

## 2023-08-14 RX ORDER — MORPHINE SULFATE 2 MG/ML
2 INJECTION, SOLUTION INTRAMUSCULAR; INTRAVENOUS
Status: DISCONTINUED | OUTPATIENT
Start: 2023-08-14 | End: 2023-08-16

## 2023-08-14 RX ORDER — NALOXONE HCL 0.4 MG/ML
0.4 VIAL (ML) INJECTION
Status: DISCONTINUED | OUTPATIENT
Start: 2023-08-14 | End: 2023-08-16

## 2023-08-14 RX ORDER — CLOPIDOGREL BISULFATE 75 MG/1
75 TABLET ORAL DAILY
Status: DISCONTINUED | OUTPATIENT
Start: 2023-08-14 | End: 2023-08-17 | Stop reason: HOSPADM

## 2023-08-14 RX ORDER — BISACODYL 5 MG/1
5 TABLET, DELAYED RELEASE ORAL DAILY PRN
Status: DISCONTINUED | OUTPATIENT
Start: 2023-08-14 | End: 2023-08-17 | Stop reason: HOSPADM

## 2023-08-14 RX ORDER — AMOXICILLIN 250 MG
2 CAPSULE ORAL 2 TIMES DAILY
Status: DISCONTINUED | OUTPATIENT
Start: 2023-08-14 | End: 2023-08-17 | Stop reason: HOSPADM

## 2023-08-14 RX ORDER — POLYETHYLENE GLYCOL 3350 17 G/17G
17 POWDER, FOR SOLUTION ORAL DAILY PRN
Status: DISCONTINUED | OUTPATIENT
Start: 2023-08-14 | End: 2023-08-17 | Stop reason: HOSPADM

## 2023-08-14 RX ORDER — ASPIRIN 81 MG/1
81 TABLET ORAL DAILY
Status: DISCONTINUED | OUTPATIENT
Start: 2023-08-15 | End: 2023-08-17 | Stop reason: HOSPADM

## 2023-08-14 RX ORDER — POTASSIUM CHLORIDE 29.8 MG/ML
20 INJECTION INTRAVENOUS ONCE
Status: COMPLETED | OUTPATIENT
Start: 2023-08-14 | End: 2023-08-14

## 2023-08-14 RX ORDER — SODIUM CHLORIDE 0.9 % (FLUSH) 0.9 %
10 SYRINGE (ML) INJECTION EVERY 12 HOURS SCHEDULED
Status: DISCONTINUED | OUTPATIENT
Start: 2023-08-14 | End: 2023-08-17 | Stop reason: HOSPADM

## 2023-08-14 RX ORDER — BISACODYL 10 MG
10 SUPPOSITORY, RECTAL RECTAL DAILY PRN
Status: DISCONTINUED | OUTPATIENT
Start: 2023-08-14 | End: 2023-08-17 | Stop reason: HOSPADM

## 2023-08-14 RX ADMIN — CARVEDILOL 12.5 MG: 12.5 TABLET, FILM COATED ORAL at 08:37

## 2023-08-14 RX ADMIN — CLOPIDOGREL BISULFATE 75 MG: 75 TABLET ORAL at 08:48

## 2023-08-14 RX ADMIN — AMIODARONE HYDROCHLORIDE 200 MG: 200 TABLET ORAL at 03:42

## 2023-08-14 RX ADMIN — SENNOSIDES AND DOCUSATE SODIUM 2 TABLET: 50; 8.6 TABLET ORAL at 08:37

## 2023-08-14 RX ADMIN — ASPIRIN 325 MG: 325 TABLET, COATED ORAL at 08:37

## 2023-08-14 RX ADMIN — ACETAMINOPHEN 650 MG: 325 TABLET ORAL at 22:02

## 2023-08-14 RX ADMIN — GABAPENTIN 300 MG: 300 CAPSULE ORAL at 08:37

## 2023-08-14 RX ADMIN — Medication 10 ML: at 12:12

## 2023-08-14 RX ADMIN — ATORVASTATIN CALCIUM 40 MG: 40 TABLET, FILM COATED ORAL at 22:02

## 2023-08-14 RX ADMIN — POLYETHYLENE GLYCOL 3350 17 G: 17 POWDER, FOR SOLUTION ORAL at 12:11

## 2023-08-14 RX ADMIN — GABAPENTIN 300 MG: 300 CAPSULE ORAL at 22:02

## 2023-08-14 RX ADMIN — IPRATROPIUM BROMIDE AND ALBUTEROL SULFATE 3 ML: 2.5; .5 SOLUTION RESPIRATORY (INHALATION) at 07:45

## 2023-08-14 RX ADMIN — POTASSIUM CHLORIDE 20 MEQ: 29.8 INJECTION, SOLUTION INTRAVENOUS at 04:42

## 2023-08-14 RX ADMIN — PANTOPRAZOLE SODIUM 40 MG: 40 TABLET, DELAYED RELEASE ORAL at 06:59

## 2023-08-14 RX ADMIN — Medication 10 ML: at 22:06

## 2023-08-14 RX ADMIN — INSULIN DETEMIR 15 UNITS: 100 INJECTION, SOLUTION SUBCUTANEOUS at 22:01

## 2023-08-14 RX ADMIN — ACETAMINOPHEN 650 MG: 325 TABLET ORAL at 06:59

## 2023-08-14 RX ADMIN — ACETAMINOPHEN 650 MG: 325 TABLET ORAL at 14:30

## 2023-08-14 RX ADMIN — INSULIN LISPRO 3 UNITS: 100 INJECTION, SOLUTION INTRAVENOUS; SUBCUTANEOUS at 18:11

## 2023-08-14 RX ADMIN — AMIODARONE HYDROCHLORIDE 200 MG: 200 TABLET ORAL at 22:02

## 2023-08-14 RX ADMIN — HYDROCODONE BITARTRATE AND ACETAMINOPHEN 1 TABLET: 7.5; 325 TABLET ORAL at 03:42

## 2023-08-14 RX ADMIN — Medication 10 ML: at 08:38

## 2023-08-14 RX ADMIN — HYDROCODONE BITARTRATE AND ACETAMINOPHEN 1 TABLET: 7.5; 325 TABLET ORAL at 12:17

## 2023-08-14 RX ADMIN — IPRATROPIUM BROMIDE AND ALBUTEROL SULFATE 3 ML: 2.5; .5 SOLUTION RESPIRATORY (INHALATION) at 19:59

## 2023-08-14 RX ADMIN — CARVEDILOL 12.5 MG: 12.5 TABLET, FILM COATED ORAL at 18:12

## 2023-08-14 RX ADMIN — AMIODARONE HYDROCHLORIDE 200 MG: 200 TABLET ORAL at 12:11

## 2023-08-14 RX ADMIN — IPRATROPIUM BROMIDE AND ALBUTEROL SULFATE 3 ML: 2.5; .5 SOLUTION RESPIRATORY (INHALATION) at 16:22

## 2023-08-14 RX ADMIN — SENNOSIDES AND DOCUSATE SODIUM 2 TABLET: 50; 8.6 TABLET ORAL at 22:02

## 2023-08-14 NOTE — PROGRESS NOTES
CTS Progress Note       LOS: 4 days   Patient Care Team:  Provider, No Known as PCP - General    Chief Complaint: <principal problem not specified>    Vital Signs:  Temp:  [97.9 øF (36.6 øC)-100 øF (37.8 øC)] 97.9 øF (36.6 øC)  Heart Rate:  [69-87] 73  Resp:  [17-22] 20  BP: ()/(49-92) 125/64    Physical Exam:     Alert conversant  Results:     Results from last 7 days   Lab Units 08/14/23  0231   WBC 10*3/mm3 10.07   HEMOGLOBIN g/dL 8.6*   HEMATOCRIT % 26.2*   PLATELETS 10*3/mm3 126*     Results from last 7 days   Lab Units 08/14/23  0231   SODIUM mmol/L 134*   POTASSIUM mmol/L 3.8   CHLORIDE mmol/L 98   CO2 mmol/L 28.0   BUN mg/dL 22   CREATININE mg/dL 1.16   GLUCOSE mg/dL 141*   CALCIUM mg/dL 9.2           Imaging Results (Last 24 Hours)       Procedure Component Value Units Date/Time    XR Chest 1 View [[id number]] Collected: 08/13/23 0830     Updated: 08/13/23 0835    Narrative:      XR CHEST 1 VW    Date of Exam: 8/13/2023 4:01 AM EDT    Indication: Post-Op Heart Surgery    Comparison: 8/12/2023 at 0453 hours    Findings:  Postoperative changes are noted. The left chest tube and mediastinal drains are stable. Atelectasis is noted within the lung bases. The right IJ venous sheath is in place. A central line extends to the SVC. There is no evidence for pneumothorax.      Impression:      Impression:  1.Postoperative changes are noted. The left chest tube and mediastinal drains are stable in position.  2.Mild atelectasis within the lung bases.  3.Stable positioning of the right IJ venous sheath and central line.      Electronically Signed: Pako Ojeda    8/13/2023 8:32 AM EDT    Workstation ID: [id number]            Assessment      MV CAD    T2DM    HTN (hypertension)    Former smoker    Vapes nicotine containing substance    GERD        Plan   Transfer to telemetry  Plavix 75 mg p.o. today and daily thereafter    Please note that portions of this note were completed with a voice recognition program.  Efforts were made to edit the dictations, but occasionally words are mistranscribed.    Spenser Louise MD  08/14/23  07:46 EDT

## 2023-08-14 NOTE — PROGRESS NOTES
Order received for Phase II Cardiac Rehab. Patient unavailable at time of consult. Staff will follow up.

## 2023-08-14 NOTE — NURSING NOTE
Prior to central line removal, order for the removal of catheter was verified, patient was assessed, necessary materials were gathered and patient was educated regarding procedure .    Patient was positioned supine to ensure that the insertion site was at or below the level of the heart.    Hands were washed, clean gloves were applied and central line dressing was gently removed. Catheter exit site was not cultured.     A new pair of clean gloves were then applied. Insertion site was cleansed with 2% Chlorhexidine swab using a circular motion beginning at the insertion site and moving outward for 30 seconds and allowed to dry.     Clamp on line was present and clamped.     Patient was instructed to perform Valsalva maneuver as catheter was withdrawn.     The central line was grasped at the insertion site and slowly pulled outward parallel to the skin. Resistance was not met.    After central line was completely removed, a sterile 4x4 gauze pad was used to apply light pressure until bleeding stopped. At that time, petroleum-based gauze and a sterile occlusive dressing was applied to exit site.     Patient was instructed to keep dressing in place for at least 24 hours and to remain in a flat or reclining position for 30 minutes post-removal.     Catheter was inspected after removal and was intact. Tip of central line was not sent for culture. Patient tolerated procedure.    Prior to central line removal, order for the removal of catheter was verified, patient was assessed, necessary materials were gathered and patient was educated regarding procedure .    Patient was positioned supine to ensure that the insertion site was at or below the level of the heart.    Hands were washed, clean gloves were applied and central line dressing was gently removed. Catheter exit site was not cultured.     A new pair of clean gloves were then applied. Insertion site was cleansed with 2% Chlorhexidine swab using a circular motion  beginning at the insertion site and moving outward for 30 seconds and allowed to dry.     Clamp on line was present and clamped.     Patient was instructed to hold breath as catheter was withdrawn.     The central line was grasped at the insertion site and slowly pulled outward parallel to the skin. Resistance was not met.    After central line was completely removed, a sterile 4x4 gauze pad was used to apply light pressure until bleeding stopped. At that time, petroleum-based gauze and a sterile occlusive dressing was applied to exit site.     Patient was instructed to keep dressing in place for at least 24 hours and to remain in a flat or reclining position for 30 minutes post-removal.     Catheter was inspected after removal and was intact. Tip of central line was not sent for culture. Patient tolerated procedure.

## 2023-08-14 NOTE — CASE MANAGEMENT/SOCIAL WORK
Continued Stay Note  [facility]     Patient Name: Dutch Arellano  MRN: [id number]  Today's Date: 8/14/2023    Admit Date: 8/9/2023    Plan: Rehab   Discharge Plan       Row Name 08/14/23 1454       Plan    Plan Rehab    Patient/Family in Agreement with Plan yes    Plan Comments Pt transferred from unit to floor today. Ambulated w/PT today and increased to 60feet but w/mod assist X2 and frequest rest breaks. OT ordered. Per family would be ok w/rehab, but would like closer to home [place] Will f/u on Tuesday post OT for referral preferences.    Final Discharge Disposition Code 30 - still a patient                   Discharge Codes    No documentation.                 Expected Discharge Date and Time       Expected Discharge Date Expected Discharge Time    Aug 17, 2023               Manuel Humphreys RN

## 2023-08-14 NOTE — THERAPY TREATMENT NOTE
Patient Name: Dutch Arellano  : 1950    MRN: [id number]                              Today's Date: 2023       Admit Date: 2023    Visit Dx: No diagnosis found.  Patient Active Problem List   Diagnosis    Coronary artery disease involving native coronary artery of native heart    MV CAD    T2DM    HTN (hypertension)    Former smoker    Vapes nicotine containing substance    GERD     Past Medical History:   Diagnosis Date    Coronary artery disease     Diabetes mellitus     Former smoker 2023    HTN (hypertension) 2023    Hypertension     Vapes nicotine containing substance 2023     Past Surgical History:   Procedure Laterality Date    CORONARY ARTERY BYPASS GRAFT N/A 2023    Procedure: MEDIAN STERNOTOMY, CORONARY ARTERY BYPASS GRAFTING X 4 UTILIZING LEFT INTERNAL MAMMARY ARTERY, ENDOSCOPIC VEIN HARVEST OF LEFT GREATER SAPHENOUS VEIN, LEFT ATRIAL APPENDAGE LIGATION, TRANSESOPHAGEAL ECHOCARDIOGRAM PER ANESTHESIA;  Surgeon: Spenser Louise MD;  Location: [facility];  Service: Cardiothoracic;  Laterality: N/A;    LEFT HEART CATH        General Information       Row Name 23 1137          Physical Therapy Time and Intention    Document Type therapy note (daily note)  -KG     Mode of Treatment physical therapy  -KG       Row Name 23 1137          General Information    Existing Precautions/Restrictions cardiac;fall;oxygen therapy device and L/min;sternal;other (see comments)  remote R AKA with prosthesis; confusion  -KG     Barriers to Rehab medically complex;previous functional deficit;cognitive status  -KG       Row Name 23 1137          Cognition    Orientation Status (Cognition) oriented x 3  -KG       Row Name 23 1137          Safety Issues, Functional Mobility    Safety Issues Affecting Function (Mobility) ability to follow commands;awareness of need for assistance;insight into deficits/self-awareness;safety precaution awareness;safety precautions  follow-through/compliance;sequencing abilities  -KG     Impairments Affecting Function (Mobility) balance;cognition;coordination;endurance/activity tolerance;motor control;motor planning;pain;postural/trunk control;shortness of breath;strength  -KG     Cognitive Impairments, Mobility Safety/Performance attention;awareness, need for assistance;insight into deficits/self-awareness;safety precaution awareness;safety precaution follow-through;sequencing abilities  -KG               User Key  (r) = Recorded By, (t) = Taken By, (c) = Cosigned By      Initials Name Provider Type    KG Chelsea Contreras, PT Physical Therapist                   Mobility       Row Name 08/14/23 1138          Bed Mobility    Comment, (Bed Mobility) UIC  -KG       Row Name 08/14/23 1138          Transfers    Comment, (Transfers) VC's for sequencing and technique. Pt requires max cueing for safe hand placement to maintain sternal precautions.  -KG       Row Name 08/14/23 1138          Sit-Stand Transfer    Sit-Stand [place] (Transfers) moderate assist (50% patient effort);2 person assist;verbal cues  -KG       Row Name 08/14/23 1138          Gait/Stairs (Locomotion)    [place] Level (Gait) moderate assist (50% patient effort);2 person assist;1 person to manage equipment;verbal cues  chair follow for safety; pt progressed to periods of Paramjit x2  -KG     Assistive Device (Gait) walker, front-wheeled  -KG     Distance in Feet (Gait) 60  -KG     Deviations/Abnormal Patterns (Gait) bilateral deviations;eliezer decreased;stride length decreased  -KG     Bilateral Gait Deviations forward flexed posture;heel strike decreased  -KG     Left Sided Gait Deviations leans left  -KG     Right Sided Gait Deviations weight shift ability decreased  -KG     Comment, (Gait/Stairs) Pt demonstrated step to gait pattern with slow eliezer and decreased step length. Max cueing for upright posture and to keep RW close with feet inside middle. Pt with  decreased weight shifting onto R LE; significant lateral lean to the L. Pt required multiple standing rest breaks. Returned to room in chair. Limited by generalized weakness and fatigue.  -KG               User Key  (r) = Recorded By, (t) = Taken By, (c) = Cosigned By      Initials Name Provider Type    Chelsea Coyne, PT Physical Therapist                   Obj/Interventions       Row Name 08/14/23 1140          Balance    Balance Assessment sitting static balance;standing static balance;standing dynamic balance  -KG     Static Sitting Balance contact guard  -KG     Position, Sitting Balance unsupported;sitting in chair  -KG     Static Standing Balance minimal assist;2-person assist  -KG     Dynamic Standing Balance moderate assist;2-person assist  -KG     Position/Device Used, Standing Balance supported  -KG               User Key  (r) = Recorded By, (t) = Taken By, (c) = Cosigned By      Initials Name Provider Type    Chelsea Coyne, PT Physical Therapist                   Goals/Plan    No documentation.                  Clinical Impression       Row Name 08/14/23 1140          Pain    Additional Documentation Pain Scale: FACES Pre/Post-Treatment (Group)  -KG       Row Name 08/14/23 1140          Pain Scale: FACES Pre/Post-Treatment    Pain: FACES Scale, Pretreatment 4-->hurts little more  -KG     Posttreatment Pain Rating 4-->hurts little more  -KG     Pain Location incisional  -KG     Pain Location - chest  -KG       Row Name 08/14/23 1140          Plan of Care Review    Plan of Care Reviewed With patient  -KG     Progress improving  -KG     Outcome Evaluation Pt increased ambulation distance to 60ft with RW and modA x2 +1, chair follow for safety. Pt progressed to periods of Paramjit x2, but continues to require consistent cues for upright posture and proper management of AD. Pt with decreased weight shifting onto R LE and significant lateral lean to the L. Required multiple standing rest  breaks and returned to room in chair. Continue to recommend PT skilled care and D/C to SNF.  -KG       Row Name 08/14/23 1140          Vital Signs    Pre Systolic BP Rehab 124  -KG     Pre Treatment Diastolic BP 67  -KG     Pretreatment Heart Rate (beats/min) 73  -KG     Posttreatment Heart Rate (beats/min) 82  -KG     Pre SpO2 (%) 96  -KG     O2 Delivery Pre Treatment supplemental O2  -KG     Post SpO2 (%) 97  -KG     O2 Delivery Post Treatment supplemental O2  -KG     Pre Patient Position Sitting  -KG     Intra Patient Position Standing  -KG     Post Patient Position Sitting  -KG       Row Name 08/14/23 1140          Positioning and Restraints    Pre-Treatment Position sitting in chair/recliner  -KG     Post Treatment Position chair  -KG     In Chair reclined;call light within reach;encouraged to call for assist;with family/caregiver;with nsg;RUE elevated;LUE elevated;legs elevated  -KG               User Key  (r) = Recorded By, (t) = Taken By, (c) = Cosigned By      Initials Name Provider Type    KG Chelsea Contreras, PT Physical Therapist                   Outcome Measures       Row Name 08/14/23 1142          How much help from another person do you currently need...    Turning from your back to your side while in flat bed without using bedrails? 2  -KG     Moving from lying on back to sitting on the side of a flat bed without bedrails? 2  -KG     Moving to and from a bed to a chair (including a wheelchair)? 2  -KG     Standing up from a chair using your arms (e.g., wheelchair, bedside chair)? 2  -KG     Climbing 3-5 steps with a railing? 1  -KG     To walk in hospital room? 2  -KG     AM-PAC 6 Clicks Score (PT) 11  -KG     Highest level of mobility 4 --> Transferred to chair/commode  -KG       Row Name 08/14/23 1142          Functional Assessment    Outcome Measure Options AM-PAC 6 Clicks Basic Mobility (PT)  -KG               User Key  (r) = Recorded By, (t) = Taken By, (c) = Cosigned By      Initials  Name Provider Type    KG Chelsea Contreras PT Physical Therapist                                 Physical Therapy Education       Title: PT OT SLP Therapies (In Progress)       Topic: Physical Therapy (In Progress)       Point: Mobility training (In Progress)       Learning Progress Summary             Patient Acceptance, E, NR by KG at 8/14/2023 0848    Acceptance, E, NR by KG at 8/12/2023 0936                         Point: Home exercise program (In Progress)       Learning Progress Summary             Patient Acceptance, E, NR by KG at 8/14/2023 0848                         Point: Body mechanics (In Progress)       Learning Progress Summary             Patient Acceptance, E, NR by KG at 8/14/2023 0848    Acceptance, E, NR by KG at 8/12/2023 0936                         Point: Precautions (In Progress)       Learning Progress Summary             Patient Acceptance, E, NR by KG at 8/14/2023 0848    Acceptance, E, NR by KG at 8/12/2023 0936                                         User Key       Initials Effective Dates Name Provider Type Discipline    ANA 05/22/20 -  Chelsea Contreras PT Physical Therapist PT                  PT Recommendation and Plan  Planned Therapy Interventions (PT): balance training, bed mobility training, gait training, strengthening, transfer training  Plan of Care Reviewed With: patient  Progress: improving  Outcome Evaluation: Pt increased ambulation distance to 60ft with RW and modA x2 +1, chair follow for safety. Pt progressed to periods of Paramjit x2, but continues to require consistent cues for upright posture and proper management of AD. Pt with decreased weight shifting onto R LE and significant lateral lean to the L. Required multiple standing rest breaks and returned to room in chair. Continue to recommend PT skilled care and D/C to SNF.     Time Calculation:   PT Evaluation Complexity  History, PT Evaluation Complexity: 3 or more personal factors and/or  comorbidities  Examination of Body Systems (PT Eval Complexity): total of 3 or more elements  Clinical Presentation (PT Evaluation Complexity): evolving  Clinical Decision Making (PT Evaluation Complexity): moderate complexity  Overall Complexity (PT Evaluation Complexity): moderate complexity     PT Charges       Row Name 08/14/23 0848             Time Calculation    Start Time 0848  -KG      PT Received On 08/14/23  -KG      PT Goal Re-Cert Due Date 08/22/23  -KG         Time Calculation- PT    Total Timed Code Minutes- PT 24 minute(s)  -KG         Timed Charges    23865 - PT Therapeutic Activity Minutes 24  -KG         Total Minutes    Timed Charges Total Minutes 24  -KG       Total Minutes 24  -KG                User Key  (r) = Recorded By, (t) = Taken By, (c) = Cosigned By      Initials Name Provider Type    KG Chelsea Contreras, PT Physical Therapist                  Therapy Charges for Today       Code Description Service Date Service Provider Modifiers Qty    37327104155 HC PT THERAPEUTIC ACT EA 15 MIN 8/14/2023 Chelsea Contreras, PT GP 2            PT G-Codes  Outcome Measure Options: AM-PAC 6 Clicks Basic Mobility (PT)  AM-PAC 6 Clicks Score (PT): 11  PT Discharge Summary  Anticipated Discharge Disposition (PT): skilled nursing facility    Elana Contreras PT  8/14/2023

## 2023-08-14 NOTE — PLAN OF CARE
Goal Outcome Evaluation:  Plan of Care Reviewed With: patient, spouse        Progress: no change  Outcome Evaluation: Transferred from 2HICU today.Vital signs wnl. Oxygen level greater than 90% on 4 liters. No complaints of shortness of breath. Pain medication given as needed. Patient refused to walk this afternoon. Patient is alert and oriented x 4 but very hard of hearing. Will continue to monitor.

## 2023-08-14 NOTE — PLAN OF CARE
Goal Outcome Evaluation:  Plan of Care Reviewed With: patient        Progress: improving  Outcome Evaluation: Pt increased ambulation distance to 60ft with RW and modA x2 +1, chair follow for safety. Pt progressed to periods of Paramjit x2, but continues to require consistent cues for upright posture and proper management of AD. Pt with decreased weight shifting onto R LE and significant lateral lean to the L. Required multiple standing rest breaks and returned to room in chair. Continue to recommend PT skilled care and D/C to SNF.      Anticipated Discharge Disposition (PT): skilled nursing facility

## 2023-08-15 ENCOUNTER — APPOINTMENT (OUTPATIENT)
Dept: GENERAL RADIOLOGY | Facility: HOSPITAL | Age: 73
DRG: 236 | End: 2023-08-15
Payer: OTHER GOVERNMENT

## 2023-08-15 LAB
ANION GAP SERPL CALCULATED.3IONS-SCNC: 9 MMOL/L (ref 5–15)
BUN SERPL-MCNC: 22 MG/DL (ref 8–23)
BUN/CREAT SERPL: 24.7 (ref 7–25)
CALCIUM SPEC-SCNC: 8.7 MG/DL (ref 8.6–10.5)
CHLORIDE SERPL-SCNC: 100 MMOL/L (ref 98–107)
CO2 SERPL-SCNC: 28 MMOL/L (ref 22–29)
CREAT SERPL-MCNC: 0.89 MG/DL (ref 0.76–1.27)
EGFRCR SERPLBLD CKD-EPI 2021: 91.1 ML/MIN/1.73
GLUCOSE BLDC GLUCOMTR-MCNC: 108 MG/DL (ref 70–130)
GLUCOSE BLDC GLUCOMTR-MCNC: 122 MG/DL (ref 70–130)
GLUCOSE BLDC GLUCOMTR-MCNC: 152 MG/DL (ref 70–130)
GLUCOSE BLDC GLUCOMTR-MCNC: 155 MG/DL (ref 70–130)
GLUCOSE SERPL-MCNC: 112 MG/DL (ref 65–99)
HCT VFR BLD AUTO: 25.2 % (ref 37.5–51)
HGB BLD-MCNC: 8.6 G/DL (ref 13–17.7)
POTASSIUM SERPL-SCNC: 4.2 MMOL/L (ref 3.5–5.2)
QT INTERVAL: 344 MS
QT INTERVAL: 450 MS
QTC INTERVAL: 482 MS
QTC INTERVAL: 502 MS
SODIUM SERPL-SCNC: 137 MMOL/L (ref 136–145)

## 2023-08-15 PROCEDURE — 97165 OT EVAL LOW COMPLEX 30 MIN: CPT

## 2023-08-15 PROCEDURE — 80048 BASIC METABOLIC PNL TOTAL CA: CPT | Performed by: STUDENT IN AN ORGANIZED HEALTH CARE EDUCATION/TRAINING PROGRAM

## 2023-08-15 PROCEDURE — 94799 UNLISTED PULMONARY SVC/PX: CPT

## 2023-08-15 PROCEDURE — 99232 SBSQ HOSP IP/OBS MODERATE 35: CPT

## 2023-08-15 PROCEDURE — 82948 REAGENT STRIP/BLOOD GLUCOSE: CPT

## 2023-08-15 PROCEDURE — 97116 GAIT TRAINING THERAPY: CPT

## 2023-08-15 PROCEDURE — 93005 ELECTROCARDIOGRAM TRACING: CPT | Performed by: NURSE PRACTITIONER

## 2023-08-15 PROCEDURE — 99024 POSTOP FOLLOW-UP VISIT: CPT

## 2023-08-15 PROCEDURE — 71045 X-RAY EXAM CHEST 1 VIEW: CPT

## 2023-08-15 PROCEDURE — 85018 HEMOGLOBIN: CPT | Performed by: STUDENT IN AN ORGANIZED HEALTH CARE EDUCATION/TRAINING PROGRAM

## 2023-08-15 PROCEDURE — 63710000001 INSULIN LISPRO (HUMAN) PER 5 UNITS: Performed by: STUDENT IN AN ORGANIZED HEALTH CARE EDUCATION/TRAINING PROGRAM

## 2023-08-15 PROCEDURE — 63710000001 INSULIN DETEMIR PER 5 UNITS: Performed by: STUDENT IN AN ORGANIZED HEALTH CARE EDUCATION/TRAINING PROGRAM

## 2023-08-15 PROCEDURE — 94761 N-INVAS EAR/PLS OXIMETRY MLT: CPT

## 2023-08-15 PROCEDURE — 85014 HEMATOCRIT: CPT | Performed by: STUDENT IN AN ORGANIZED HEALTH CARE EDUCATION/TRAINING PROGRAM

## 2023-08-15 RX ADMIN — INSULIN DETEMIR 15 UNITS: 100 INJECTION, SOLUTION SUBCUTANEOUS at 21:32

## 2023-08-15 RX ADMIN — AMIODARONE HYDROCHLORIDE 200 MG: 200 TABLET ORAL at 18:26

## 2023-08-15 RX ADMIN — AMIODARONE HYDROCHLORIDE 200 MG: 200 TABLET ORAL at 10:21

## 2023-08-15 RX ADMIN — PANTOPRAZOLE SODIUM 40 MG: 40 TABLET, DELAYED RELEASE ORAL at 05:14

## 2023-08-15 RX ADMIN — ASPIRIN 81 MG: 81 TABLET, COATED ORAL at 08:55

## 2023-08-15 RX ADMIN — ATORVASTATIN CALCIUM 40 MG: 40 TABLET, FILM COATED ORAL at 21:33

## 2023-08-15 RX ADMIN — ACETAMINOPHEN 650 MG: 325 TABLET ORAL at 12:49

## 2023-08-15 RX ADMIN — SENNOSIDES AND DOCUSATE SODIUM 2 TABLET: 50; 8.6 TABLET ORAL at 08:55

## 2023-08-15 RX ADMIN — GABAPENTIN 300 MG: 300 CAPSULE ORAL at 21:33

## 2023-08-15 RX ADMIN — CARVEDILOL 12.5 MG: 12.5 TABLET, FILM COATED ORAL at 08:56

## 2023-08-15 RX ADMIN — CARVEDILOL 12.5 MG: 12.5 TABLET, FILM COATED ORAL at 18:25

## 2023-08-15 RX ADMIN — CLOPIDOGREL BISULFATE 75 MG: 75 TABLET ORAL at 08:56

## 2023-08-15 RX ADMIN — Medication 10 ML: at 21:33

## 2023-08-15 RX ADMIN — ACETAMINOPHEN 650 MG: 325 TABLET ORAL at 05:12

## 2023-08-15 RX ADMIN — GABAPENTIN 300 MG: 300 CAPSULE ORAL at 08:55

## 2023-08-15 RX ADMIN — ACETAMINOPHEN 650 MG: 325 TABLET ORAL at 21:32

## 2023-08-15 RX ADMIN — IPRATROPIUM BROMIDE AND ALBUTEROL SULFATE 3 ML: 2.5; .5 SOLUTION RESPIRATORY (INHALATION) at 07:15

## 2023-08-15 RX ADMIN — AMIODARONE HYDROCHLORIDE 200 MG: 200 TABLET ORAL at 05:12

## 2023-08-15 RX ADMIN — INSULIN LISPRO 3 UNITS: 100 INJECTION, SOLUTION INTRAVENOUS; SUBCUTANEOUS at 12:49

## 2023-08-15 NOTE — CASE MANAGEMENT/SOCIAL WORK
Continued Stay Note  [facility]     Patient Name: Dutch Arellano  MRN: [id number]  Today's Date: 8/15/2023    Admit Date: 8/9/2023    Plan: rehab   Discharge Plan       Row Name 08/15/23 1519       Plan    Plan rehab    Patient/Family in Agreement with Plan yes    Plan Comments I was contacted by Belle @ [facility], will take referral and requested for me to go ahead and fax to her, to present in am. She said I could fax OT notes once in later. I faxed referral to her @ [phone].    Final Discharge Disposition Code 62 - inpatient rehab facility      Row Name 08/15/23 1426       Plan    Plan Rehab    Patient/Family in Agreement with Plan yes    Plan Comments I spoke w/pt, spouse and daughter @ the bedside regarding d/c plan. Awaiting OT notes/recs for rehab referral. They verbalized that they would like pt to go to rehab, closer to home. THey verbalized interest in [facility] for acute rehab. I will go ahead and call Belle w/referral and update when OT notes are in Epic. Left  w/Belle @ [facility].    Final Discharge Disposition Code 62 - inpatient rehab facility                   Discharge Codes    No documentation.                 Expected Discharge Date and Time       Expected Discharge Date Expected Discharge Time    Aug 17, 2023               Manuel Humphreys RN

## 2023-08-15 NOTE — PROGRESS NOTES
[facility] at [facility]  PROGRESS NOTE    Dutch Arellano   [id number]   1950    LOS: 5 days .  Date of Admission: 8/9/2023  Date of Service: 08/15/23    Primary Care Physician: Provider, No Known    Chief Complaint: f/u HTN, HLD, brief post op Afib      Problem List:   MV CAD    T2DM    HTN (hypertension)    Former smoker    Vapes nicotine containing substance    GERD    Subjective      Pt sitting up in bed, eating breakfast. Family at bedside. Pt denies chest pain, palpitations, and shortness of breath. He is still on 2L NC but is using his IS frequently. Pt is having issues with his prosthetic leg and is interested in SNF placement.     ROS  All systems have been reviewed and are negative with the exception of those mentioned in the HPI and problem list above.     Objective     Vital Sign Min/Max for last 24 hours  Temp  Min: 97.6 øF (36.4 øC)  Max: 98 øF (36.7 øC)   BP  Min: 102/59  Max: 145/80   Pulse  Min: 68  Max: 76   Resp  Min: 16  Max: 18   SpO2  Min: 94 %  Max: 98 %   No data recorded   Weight  Min: 91.8 kg (202 lb 4.8 oz)  Max: 91.8 kg (202 lb 4.8 oz)     Physical Exam:  GENERAL: Alert, cooperative, in no acute distress.   HEENT: Normocephalic, no jugular venous distention  HEART: Regular rhythm, normal rate, and no murmurs, gallops, or rubs.   LUNGS: Clear to auscultation bilaterally. No wheezing, rales or rhonchi. On 2 L NC  NEUROLOGIC: No focal abnormalities involving strength or sensation are noted.   EXTREMITIES: No clubbing, cyanosis, or edema noted. Right BKA.    Results:  Results from last 7 days   Lab Units 08/15/23  0502 08/14/23  0231 08/13/23  0310 08/12/23  0219   WBC 10*3/mm3  --  10.07 12.95* 11.41*   HEMOGLOBIN g/dL 8.6* 8.6* 9.4* 10.0*   HEMATOCRIT % 25.2* 26.2* 27.1* 28.8*   PLATELETS 10*3/mm3  --  126* 155 160     Results from last 7 days   Lab Units 08/15/23  0502 08/14/23  0932 08/14/23  0231 08/13/23  0920 08/13/23  0310   SODIUM mmol/L 137  --  134*   --  136   POTASSIUM mmol/L 4.2 3.9 3.8   < > 3.8   CHLORIDE mmol/L 100  --  98  --  100   CO2 mmol/L 28.0  --  28.0  --  24.0   BUN mg/dL 22  --  22  --  17   CREATININE mg/dL 0.89  --  1.16  --  1.14   GLUCOSE mg/dL 112*  --  141*  --  158*    < > = values in this interval not displayed.      Lab Results   Component Value Date    CHOL 192 08/10/2023    TRIG 474 (H) 08/10/2023    HDL 29 (L) 08/10/2023    LDL 86 08/10/2023    AST 66 (H) 08/12/2023    ALT 32 08/12/2023     Results from last 7 days   Lab Units 08/10/23  0104   HEMOGLOBIN A1C % 7.10*     Results from last 7 days   Lab Units 08/10/23  0104   CHOLESTEROL mg/dL 192   TRIGLYCERIDES mg/dL 474*   HDL CHOL mg/dL 29*   LDL CHOL mg/dL 86           Results from last 7 days   Lab Units 08/12/23  0219 08/11/23  1126 08/10/23  0104   PROTIME Seconds 14.8* 18.3* 13.9   INR  1.15* 1.50* 1.06   APTT seconds  --  31.6 44.2*           Intake/Output Summary (Last 24 hours) at 8/15/2023 0842  Last data filed at 8/15/2023 0516  Gross per 24 hour   Intake --   Output 850 ml   Net -850 ml     EKG/TELE: I have personally reviewed the telemetry    Radiology Data:   No radiology results for the last day   Results for orders placed during the hospital encounter of 08/09/23    Adult Transthoracic Echocardiogram Complete    Interpretation Summary    Left ventricular systolic function is normal. Estimated left ventricular EF = 70%    The cardiac valves are anatomically and functionally normal.    Estimated right ventricular systolic pressure from tricuspid regurgitation is normal (<35 mmHg).       Current Medications:  acetaminophen, 650 mg, Oral, Q8H  amiodarone, 200 mg, Oral, Q8H   Followed by  [START ON 8/20/2023] amiodarone, 200 mg, Oral, Q12H   Followed by  [START ON 9/3/2023] amiodarone, 200 mg, Oral, Daily  aspirin, 81 mg, Oral, Daily  atorvastatin, 40 mg, Oral, Nightly  carvedilol, 12.5 mg, Oral, BID With Meals  clopidogrel, 75 mg, Oral, Daily  gabapentin, 300 mg, Oral,  Q12H  insulin detemir, 15 Units, Subcutaneous, Nightly  insulin lispro, 3-14 Units, Subcutaneous, 4x Daily AC & at Bedtime  ipratropium-albuterol, 3 mL, Nebulization, 4x Daily - RT  pantoprazole, 40 mg, Oral, Q AM  pharmacy consult - MTM, , Does not apply, Daily  senna-docusate sodium, 2 tablet, Oral, BID  senna-docusate sodium, 2 tablet, Oral, BID  sodium chloride, 10 mL, Intravenous, Q12H         Assessment and Plan:   1. MVCAD  - normal EF pre-op  - CABG x4 with Dr. Louise 8/11 (LIMA-LAD, SVG to diagonal, OM and RCA)  - PO clip with #35 Atricure clip  - continue ASA, statin      2. Hypertension   - on BB  - consider ACE/ARB if needed as next agent      3. Hyperlipidemia   - LDL 86, Trig 474  - on Lipitor 40mg    4. DM2  - A1C 7.1%  - per intensivists     5. PAF  - amiodarone protocol initiated  - EKG today reviewed, acceptable QTc, remains in NSR  - defer anticoagulation at this point given brief atrial fibrillation and recent left atrial appendage closure.    SNF placement per case management.     Electronically signed by JANIE Nix, 08/15/23, 8:42 AM EDT.     Please note that portions of this note were dictated utilizing Dragon dictation.

## 2023-08-15 NOTE — PLAN OF CARE
Goal Outcome Evaluation:           Progress: no change  Outcome Evaluation: Pt VSS, pain controlled by prn medication. Large BM today. Up with 2 and prosthesis. requiring 2L/NC to keep sats above 92%. Incisions dry and intact. Scant drainage intermittent from left lower leg incision. Will cont to monitor. Awaiting placement.

## 2023-08-15 NOTE — CASE MANAGEMENT/SOCIAL WORK
Continued Stay Note  [facility]     Patient Name: Dutch Arellano  MRN: [id number]  Today's Date: 8/15/2023    Admit Date: 8/9/2023    Plan: Rehab   Discharge Plan       Row Name 08/15/23 1426       Plan    Plan Rehab    Patient/Family in Agreement with Plan yes    Plan Comments I spoke w/pt, spouse and daughter @ the bedside regarding d/c plan. Awaiting OT notes/recs for rehab referral. They verbalized that they would like pt to go to rehab, closer to home. THey verbalized interest in [facility] for acute rehab. I will go ahead and call Belle w/referral and update when OT notes are in Epic. Left  w/Belle @ [facility].    Final Discharge Disposition Code 62 - inpatient rehab facility                   Discharge Codes    No documentation.                 Expected Discharge Date and Time       Expected Discharge Date Expected Discharge Time    Aug 17, 2023               Manuel Humphreys RN

## 2023-08-15 NOTE — PLAN OF CARE
Goal Outcome Evaluation:  Plan of Care Reviewed With: patient        Progress: no change  Outcome Evaluation: Physical therapy treatment complete. The patient continues to require repeated cues to maintain sternal precautions. Patient improved tolerance to mobility. Patient increased ambulation distance compared to previous treatment session, though continues to demonstrate decreased weight shifitng to R side. This may be due to isues with prosthetic LE as patient stated that kept slipping in leg, his seal was not correct. The patient continues to present below baseline for functional mobility. The patient would continue to benefit from skilled PT to address strength, balance and activity tolerance deficits. Continue to current PT POC      Anticipated Discharge Disposition (PT): inpatient rehabilitation facility

## 2023-08-15 NOTE — PLAN OF CARE
Goal Outcome Evaluation:  Plan of Care Reviewed With: patient        Progress: no change  Outcome Evaluation: Pt. presents below baseline with ADLs and functional mobility. Limited by impaired activity tolerance, generalized weakness, and balance. Skilled OT services warranted to promote return to PLOF. Recommend IPR at discharge.      Anticipated Discharge Disposition (OT): inpatient rehabilitation facility

## 2023-08-15 NOTE — DISCHARGE PLACEMENT REQUEST
"Dutch Barlow (73 y.o. Male) From Manuel Humphreys RN CM [id number]      Date of Birth   1950    Social Security Number       Address   [address] [facility] [address]    Home Phone   [phone]    MRN   [id number]       [facility]    Marital Status                               Admission Date   8/9/23    Admission Type   Elective    Admitting Provider   Spenser Louise MD    Attending Provider   Spenser Louise MD    Department, Room/Bed   [address], S486/1       Discharge Date       Discharge Disposition       Discharge Destination                                 Attending Provider: Spenser Louise MD    Allergies: No Known Allergies    Isolation: None   Infection: None   Code Status: CPR    Ht: 180.3 cm (71\")   Wt: 91.8 kg (202 lb 4.8 oz)    Admission Cmt: None   Principal Problem: None                  Active Insurance as of 8/9/2023       Primary Coverage       Payor Plan Insurance Group Employer/Plan Group    MEDICARE MEDICARE A & B        Payor Plan Address Payor Plan Phone Number Payor Plan Fax Number Effective Dates    [address] [phone]  11/1/2012 - None Entered    [facility] [id number]         Subscriber Name Subscriber Birth Date Member ID       DUTCH BARLOW 1950 [id number]               Secondary Coverage       Payor Plan Insurance Group Employer/Plan Group    [facility] [employer]        Payor Plan Address Payor Plan Phone Number Payor Plan Fax Number Effective Dates    [address] [phone]  8/7/2023 - None Entered    [facility] [id number]         Subscriber Name Subscriber Birth Date Member ID       DUTCH BARLOW 1950 [id number]                     Emergency Contacts        (Rel.) Home Phone Work Phone Mobile Phone    YENBRUNILDA (Spouse) [phone]                 History & Physical        Joyce Paz APRN at 08/10/23 0700       Attestation signed " by Spenser Louise MD at 23 0643    I have reviewed this documentation and agree.                       [facility] Cardiothoracic Surgery                            History & Physical    Name:  Dutch Arellano  MRN Number:  [id number]  Date of Admission:  2023    PCP: Provider, No Known    History of Present Illness:    Dutch Arellano is a 73 y.o. male with a history of former smoker (does vape), type 2 diabetes-A1c 7.1, liposarcoma s/p right AKA, HTN, CAD s/p 5 stents, and family history of heart disease in his father who  of MI at age 79.  Patient initially presented to [facility] on  for chest pain. He underwent cardiac catheterization which revealed multivessel coronary artery disease and was transferred to our facility on  for higher level of care and surgical revascularization.  He reports a 2-week history of chest pain and fatigue.  He denies any history of chest radiation, vein stripping or lasering procedures, or kidney disease.  He is currently chest pain-free.  On heparin and nitroglycerin drips.    Review of Systems:  Review of Systems   Constitutional: Positive for malaise/fatigue.   HENT: Negative.     Eyes: Negative.    Cardiovascular:  Positive for chest pain.   Respiratory: Negative.     Endocrine: Negative.    Hematologic/Lymphatic: Negative.    Skin: Negative.    Musculoskeletal: Negative.    Gastrointestinal: Negative.    Genitourinary: Negative.    Neurological: Negative.    Psychiatric/Behavioral: Negative.     Allergic/Immunologic: Negative.      Past Medical History:  History reviewed. No pertinent past medical history.    Past Surgical History:  History reviewed. No pertinent surgical history.    Family History:  History reviewed. No pertinent family history.    Social History:    Social History     Socioeconomic History    Marital status:    Tobacco Use    Smoking status: Never    Smokeless tobacco: Never   Vaping Use    Vaping Use: Every day    Substance and Sexual Activity    Alcohol use: Never    Drug use: Never       Allergies:  No Known Allergies    Medications:      Current Facility-Administered Medications:     allopurinol (ZYLOPRIM) tablet 100 mg, 100 mg, Oral, Daily, Danica Honeycutt PA-C    aspirin EC tablet 81 mg, 81 mg, Oral, Once, Danica Honeycutt PA-C    carvedilol (COREG) tablet 12.5 mg, 12.5 mg, Oral, BID With Meals, Danica Honeycutt PA-C    chlorhexidine (PERIDEX) 0.12 % solution 15 mL, 15 mL, Mouth/Throat, BID, Danica Honeycutt PA-C    Chlorhexidine Gluconate Cloth 2 % pads, , Topical, Q12H PRN, Danica Honeycutt PA-C    gabapentin (NEURONTIN) capsule 300 mg, 300 mg, Oral, Q12H, Spenser Louise MD    heparin 47028 units/250 mL (100 units/mL) in 0.45 % NaCl infusion, 11 Units/kg/hr, Intravenous, Titrated, Danica Honeycutt PA-C, Last Rate: 9.84 mL/hr at 08/10/23 0126, 11 Units/kg/hr at 08/10/23 0126    hydroCHLOROthiazide (MICROZIDE) capsule 37.5 mg, 37.5 mg, Oral, Daily, Danica Honeycutt PA-C    ipratropium-albuterol (DUO-NEB) nebulizer solution 3 mL, 3 mL, Nebulization, Q4H PRN, Danica Honeycutt PA-C    mupirocin (BACTROBAN) 2 % nasal ointment 1 application , 1 application , Each Nare, BID, Danica Honeycutt PA-C    nitroglycerin (TRIDIL) 200 mcg/ml infusion, 10-50 mcg/min, Intravenous, Titrated, Danica Honeycutt PA-C, Last Rate: 15 mL/hr at 08/10/23 0129, 50 mcg/min at 08/10/23 0129    pantoprazole (PROTONIX) EC tablet 40 mg, 40 mg, Oral, Q AM, Danica Honeycutt PA-C, 40 mg at 08/10/23 0542    Pharmacy to Dose Heparin, , Does not apply, Continuous PRN, Yinka, Danica, PA-C    sodium chloride 0.9 % flush 10 mL, 10 mL, Intravenous, Q12H, Marbella Honeycutti, PA-C, 10 mL at 08/10/23 0131    sodium chloride 0.9 % flush 10 mL, 10 mL, Intravenous, PRN, Yinka, Danica, PA-C    sodium chloride 0.9 % infusion 40 mL, 40 mL, Intravenous, PRN, Valencia, Danica, PA-C    Physical Exam:  Vital Signs:    Vitals:    08/10/23 0003 08/10/23 0300 08/10/23  "0500 08/10/23 0544   BP: 143/90   103/64   BP Location: Left arm   Left arm   Patient Position: Lying   Lying   Pulse: 68 59 58 69   Resp: 18   18   Temp: 98.2 øF (36.8 øC)   98.1 øF (36.7 øC)   TempSrc: Oral   Oral   SpO2: 92% 93% 91% 92%   Weight: 89.5 kg (197 lb 6.4 oz)      Height: 180.3 cm (71\")        Body mass index is 27.53 kg/mý.     Physical Exam  Constitutional:       Appearance: Normal appearance.   HENT:      Head: Normocephalic.      Mouth/Throat:      Pharynx: Oropharynx is clear.   Eyes:      Pupils: Pupils are equal, round, and reactive to light.   Cardiovascular:      Rate and Rhythm: Normal rate.      Pulses: Normal pulses.   Pulmonary:      Effort: Pulmonary effort is normal.   Abdominal:      General: Bowel sounds are normal.   Musculoskeletal:      Cervical back: Normal range of motion.      Right Lower Extremity: Right leg is amputated above knee.   Skin:     General: Skin is warm.   Neurological:      General: No focal deficit present.      Mental Status: He is alert and oriented to person, place, and time.   Psychiatric:         Mood and Affect: Mood normal.         Behavior: Behavior normal.       Labs/Imaging/Procedures:   Results from last 7 days   Lab Units 08/10/23  0104   SODIUM mmol/L 140   POTASSIUM mmol/L 4.1   CHLORIDE mmol/L 102   CO2 mmol/L 29.0   BUN mg/dL 16   CREATININE mg/dL 0.98   CALCIUM mg/dL 9.6   BILIRUBIN mg/dL 0.3   ALK PHOS U/L 58   ALT (SGPT) U/L 40   AST (SGOT) U/L 27   GLUCOSE mg/dL 171*         Results from last 7 days   Lab Units 08/10/23  0104   WBC 10*3/mm3 7.35   HEMOGLOBIN g/dL 14.1   HEMATOCRIT % 39.7   PLATELETS 10*3/mm3 184     Results from last 7 days   Lab Units 08/10/23  0104   INR  1.06   APTT seconds 44.2*         Results from last 7 days   Lab Units 08/10/23  0104   CHOLESTEROL mg/dL 192   TRIGLYCERIDES mg/dL 474*   HDL CHOL mg/dL 29*   LDL CHOL mg/dL 86     No radiology results for the last 30 days.       Assessment:    Coronary artery " "disease      Plan:  Preop for CABG tomorrow with Dr. Louise  N.p.o. after midnight    Joyce Paz, APRN  08/10/23  07:01 EDT        Electronically signed by Spenser Louise MD at 08/12/23 0696          Operative/Procedure Notes (most recent note)        Spenser Louise MD at 08/11/23 0492          Operative Report    Preop Diagnosis: Coronary disease.  Questionable history of atrial fibrillation.  Previous coronary stents.  Previous leg amputation above the knee    Results of STS risk score discussed with patient and family    Postoperative Diagnosis: Same      Procedure: Coronary artery bypass grafting x 4 with endoscopic harvesting of the left great saphenous vein.  And left atrial appendage ligation with a size 35 atrial clip.  Left internal mammary artery to the left anterior descending.  Saphenous vein graft to the diagonal branch and LAD.  Saphenous vein graft to the obtuse marginal branch of the circumflex and saphenous vein graft to the right coronary artery        Surgeons: Spenser Louise MD      Assistant: Danica Honeycutt PA-C    The Assistant provided services of suctioning, irrigation and retraction.  Also, assisted in suture closure of parts of the skin incision.      Indication and description\"      patient referred to an axillary transferred to our facility with the above listed medical problems.  He understood that the surgery had with the risk of stroke bleed infection death renal failure and agreed to proceed.  The STS risk database was discussed with the patient.     Supine position sterile prep and drape.  General endotracheal anesthesia and antibiotics given.  The left great saphenous vein was harvested endoscopically and median sternotomy performed and the left internal mammary artery was harvested as a pedicle graft.  Patient was fully heparinized and cannulas placed in the ascending order and right atrial appendage patient begun on cardiopulmonary bypass.  Aorta crossclamped " and antegrade cardioplegia given for saphenous vein graft sutured into side fashion to a 2 mm obtuse marginal branch circumflex and the second saphenous vein graft to 1-1/2 mm posterior descending branch of the right coronary which was the only graftable vessel on the inferior surface of the heart.  Third saphenous vein graft sutured to a very small diffusely diseased 1-1/2 mm diagonal branch of the LAD and then the left internal mammary was anastomosed to the left anterior descending coronary.  3 proximal vein grafts were sutured to the ascending aorta.  Transesophageal echo demonstrated no clot in the left atrial appendage and it was ligated with a size 35 atrial clip.  Patient was weaned from bypass without difficulty and without the need for pressor agents.  Postoperative transesophageal echo demonstrated closure of left atrial appendage and 4 different views were recorded.  Sternum was closed with wire fascia and skin were to be closed with suture sponge and needle count was off by 1700 and a chest x-ray was taken which did not demonstrate any retained objects.  There was no apparent early complications and estimated blood loss 350 mL.              Please note that portions of this note were completed with a voice recognition program. Efforts were made to edit the dictations, but occasionally words are mistranscribed.    Electronically signed by Spenser Louise MD at 08/11/23 1054          Physician Progress Notes (most recent note)        Christen Valentino APRN at 08/15/23 0842            [facility]  PROGRESS NOTE    Dutch Adan   [id number]   1950    LOS: 5 days .  Date of Admission: 8/9/2023  Date of Service: 08/15/23    Primary Care Physician: Provider, No Known    Chief Complaint: f/u HTN, HLD, brief post op Afib      Problem List:   MV CAD    T2DM    HTN (hypertension)    Former smoker    Vapes nicotine containing substance    GERD    Subjective      Pt  sitting up in bed, eating breakfast. Family at bedside. Pt denies chest pain, palpitations, and shortness of breath. He is still on 2L NC but is using his IS frequently. Pt is having issues with his prosthetic leg and is interested in SNF placement.     ROS  All systems have been reviewed and are negative with the exception of those mentioned in the HPI and problem list above.     Objective     Vital Sign Min/Max for last 24 hours  Temp  Min: 97.6 øF (36.4 øC)  Max: 98 øF (36.7 øC)   BP  Min: 102/59  Max: 145/80   Pulse  Min: 68  Max: 76   Resp  Min: 16  Max: 18   SpO2  Min: 94 %  Max: 98 %   No data recorded   Weight  Min: 91.8 kg (202 lb 4.8 oz)  Max: 91.8 kg (202 lb 4.8 oz)     Physical Exam:  GENERAL: Alert, cooperative, in no acute distress.   HEENT: Normocephalic, no jugular venous distention  HEART: Regular rhythm, normal rate, and no murmurs, gallops, or rubs.   LUNGS: Clear to auscultation bilaterally. No wheezing, rales or rhonchi. On 2 L NC  NEUROLOGIC: No focal abnormalities involving strength or sensation are noted.   EXTREMITIES: No clubbing, cyanosis, or edema noted. Right BKA.    Results:  Results from last 7 days   Lab Units 08/15/23  0502 08/14/23  0231 08/13/23  0310 08/12/23  0219   WBC 10*3/mm3  --  10.07 12.95* 11.41*   HEMOGLOBIN g/dL 8.6* 8.6* 9.4* 10.0*   HEMATOCRIT % 25.2* 26.2* 27.1* 28.8*   PLATELETS 10*3/mm3  --  126* 155 160     Results from last 7 days   Lab Units 08/15/23  0502 08/14/23  0932 08/14/23  0231 08/13/23  0920 08/13/23  0310   SODIUM mmol/L 137  --  134*  --  136   POTASSIUM mmol/L 4.2 3.9 3.8   < > 3.8   CHLORIDE mmol/L 100  --  98  --  100   CO2 mmol/L 28.0  --  28.0  --  24.0   BUN mg/dL 22  --  22  --  17   CREATININE mg/dL 0.89  --  1.16  --  1.14   GLUCOSE mg/dL 112*  --  141*  --  158*    < > = values in this interval not displayed.      Lab Results   Component Value Date    CHOL 192 08/10/2023    TRIG 474 (H) 08/10/2023    HDL 29 (L) 08/10/2023    LDL 86 08/10/2023     AST 66 (H) 08/12/2023    ALT 32 08/12/2023     Results from last 7 days   Lab Units 08/10/23  0104   HEMOGLOBIN A1C % 7.10*     Results from last 7 days   Lab Units 08/10/23  0104   CHOLESTEROL mg/dL 192   TRIGLYCERIDES mg/dL 474*   HDL CHOL mg/dL 29*   LDL CHOL mg/dL 86           Results from last 7 days   Lab Units 08/12/23  0219 08/11/23  1126 08/10/23  0104   PROTIME Seconds 14.8* 18.3* 13.9   INR  1.15* 1.50* 1.06   APTT seconds  --  31.6 44.2*           Intake/Output Summary (Last 24 hours) at 8/15/2023 0842  Last data filed at 8/15/2023 0516  Gross per 24 hour   Intake --   Output 850 ml   Net -850 ml     EKG/TELE: I have personally reviewed the telemetry    Radiology Data:   No radiology results for the last day   Results for orders placed during the hospital encounter of 08/09/23    Adult Transthoracic Echocardiogram Complete    Interpretation Summary    Left ventricular systolic function is normal. Estimated left ventricular EF = 70%    The cardiac valves are anatomically and functionally normal.    Estimated right ventricular systolic pressure from tricuspid regurgitation is normal (<35 mmHg).       Current Medications:  acetaminophen, 650 mg, Oral, Q8H  amiodarone, 200 mg, Oral, Q8H   Followed by  [START ON 8/20/2023] amiodarone, 200 mg, Oral, Q12H   Followed by  [START ON 9/3/2023] amiodarone, 200 mg, Oral, Daily  aspirin, 81 mg, Oral, Daily  atorvastatin, 40 mg, Oral, Nightly  carvedilol, 12.5 mg, Oral, BID With Meals  clopidogrel, 75 mg, Oral, Daily  gabapentin, 300 mg, Oral, Q12H  insulin detemir, 15 Units, Subcutaneous, Nightly  insulin lispro, 3-14 Units, Subcutaneous, 4x Daily AC & at Bedtime  ipratropium-albuterol, 3 mL, Nebulization, 4x Daily - RT  pantoprazole, 40 mg, Oral, Q AM  pharmacy consult - MTM, , Does not apply, Daily  senna-docusate sodium, 2 tablet, Oral, BID  senna-docusate sodium, 2 tablet, Oral, BID  sodium chloride, 10 mL, Intravenous, Q12H         Assessment and Plan:   1.  MVCAD  - normal EF pre-op  - CABG x4 with Dr. Louise  (LIMA-LAD, SVG to diagonal, OM and RCA)  - PO clip with #35 Atricure clip  - continue ASA, statin      2. Hypertension   - on BB  - consider ACE/ARB if needed as next agent      3. Hyperlipidemia   - LDL 86, Trig 474  - on Lipitor 40mg    4. DM2  - A1C 7.1%  - per intensivists     5. PAF  - amiodarone protocol initiated  - EKG today reviewed, acceptable QTc, remains in NSR  - defer anticoagulation at this point given brief atrial fibrillation and recent left atrial appendage closure.    SNF placement per case management.     Electronically signed by JANIE Nix, 08/15/23, 8:42 AM EDT.     Please note that portions of this note were dictated utilizing Dragon dictation.     Electronically signed by Christen Valentino APRN at 08/15/23 0936       Consult Notes (most recent note)    No notes of this type exist for this encounter.          Physical Therapy Notes (most recent note)        Chelsea Contreras, PT at 23 0848  Version 1 of 1         Patient Name: Dutch Arellano  : 1950    MRN: [id number]                              Today's Date: 2023       Admit Date: 2023    Visit Dx: No diagnosis found.  Patient Active Problem List   Diagnosis    Coronary artery disease involving native coronary artery of native heart    MV CAD    T2DM    HTN (hypertension)    Former smoker    Vapes nicotine containing substance    GERD     Past Medical History:   Diagnosis Date    Coronary artery disease     Diabetes mellitus     Former smoker 2023    HTN (hypertension) 2023    Hypertension     Vapes nicotine containing substance 2023     Past Surgical History:   Procedure Laterality Date    CORONARY ARTERY BYPASS GRAFT N/A 2023    Procedure: MEDIAN STERNOTOMY, CORONARY ARTERY BYPASS GRAFTING X 4 UTILIZING LEFT INTERNAL MAMMARY ARTERY, ENDOSCOPIC VEIN HARVEST OF LEFT GREATER SAPHENOUS VEIN, LEFT ATRIAL APPENDAGE LIGATION,  TRANSESOPHAGEAL ECHOCARDIOGRAM PER ANESTHESIA;  Surgeon: Spenser Louise MD;  Location: [facility];  Service: Cardiothoracic;  Laterality: N/A;    LEFT HEART CATH        General Information       Row Name 08/14/23 1137          Physical Therapy Time and Intention    Document Type therapy note (daily note)  -KG     Mode of Treatment physical therapy  -KG       Row Name 08/14/23 1137          General Information    Existing Precautions/Restrictions cardiac;fall;oxygen therapy device and L/min;sternal;other (see comments)  remote R AKA with prosthesis; confusion  -KG     Barriers to Rehab medically complex;previous functional deficit;cognitive status  -KG       Row Name 08/14/23 1137          Cognition    Orientation Status (Cognition) oriented x 3  -KG       Row Name 08/14/23 1137          Safety Issues, Functional Mobility    Safety Issues Affecting Function (Mobility) ability to follow commands;awareness of need for assistance;insight into deficits/self-awareness;safety precaution awareness;safety precautions follow-through/compliance;sequencing abilities  -KG     Impairments Affecting Function (Mobility) balance;cognition;coordination;endurance/activity tolerance;motor control;motor planning;pain;postural/trunk control;shortness of breath;strength  -KG     Cognitive Impairments, Mobility Safety/Performance attention;awareness, need for assistance;insight into deficits/self-awareness;safety precaution awareness;safety precaution follow-through;sequencing abilities  -KG               User Key  (r) = Recorded By, (t) = Taken By, (c) = Cosigned By      Initials Name Provider Type    KG Chelsea Contreras, PT Physical Therapist                   Mobility       Row Name 08/14/23 1138          Bed Mobility    Comment, (Bed Mobility) UIC  -KG       Row Name 08/14/23 1138          Transfers    Comment, (Transfers) VC's for sequencing and technique. Pt requires max cueing for safe hand placement to maintain sternal  precautions.  -KG       Row Name 08/14/23 1138          Sit-Stand Transfer    Sit-Stand [place] (Transfers) moderate assist (50% patient effort);2 person assist;verbal cues  -KG       Row Name 08/14/23 1138          Gait/Stairs (Locomotion)    [place] Level (Gait) moderate assist (50% patient effort);2 person assist;1 person to manage equipment;verbal cues  chair follow for safety; pt progressed to periods of Paramjit x2  -KG     Assistive Device (Gait) walker, front-wheeled  -KG     Distance in Feet (Gait) 60  -KG     Deviations/Abnormal Patterns (Gait) bilateral deviations;eliezer decreased;stride length decreased  -KG     Bilateral Gait Deviations forward flexed posture;heel strike decreased  -KG     Left Sided Gait Deviations leans left  -KG     Right Sided Gait Deviations weight shift ability decreased  -KG     Comment, (Gait/Stairs) Pt demonstrated step to gait pattern with slow eliezer and decreased step length. Max cueing for upright posture and to keep RW close with feet inside middle. Pt with decreased weight shifting onto R LE; significant lateral lean to the L. Pt required multiple standing rest breaks. Returned to room in chair. Limited by generalized weakness and fatigue.  -KG               User Key  (r) = Recorded By, (t) = Taken By, (c) = Cosigned By      Initials Name Provider Type    KG Chelsea Contreras, PT Physical Therapist                   Obj/Interventions       Row Name 08/14/23 1140          Balance    Balance Assessment sitting static balance;standing static balance;standing dynamic balance  -KG     Static Sitting Balance contact guard  -KG     Position, Sitting Balance unsupported;sitting in chair  -KG     Static Standing Balance minimal assist;2-person assist  -KG     Dynamic Standing Balance moderate assist;2-person assist  -KG     Position/Device Used, Standing Balance supported  -KG               User Key  (r) = Recorded By, (t) = Taken By, (c) = Cosigned By      Initials  Name Provider Type    KG Merealisonescobar Chelsea N, PT Physical Therapist                   Goals/Plan    No documentation.                  Clinical Impression       Row Name 08/14/23 1140          Pain    Additional Documentation Pain Scale: FACES Pre/Post-Treatment (Group)  -KG       Row Name 08/14/23 1140          Pain Scale: FACES Pre/Post-Treatment    Pain: FACES Scale, Pretreatment 4-->hurts little more  -KG     Posttreatment Pain Rating 4-->hurts little more  -KG     Pain Location incisional  -KG     Pain Location - chest  -KG       Row Name 08/14/23 1140          Plan of Care Review    Plan of Care Reviewed With patient  -KG     Progress improving  -KG     Outcome Evaluation Pt increased ambulation distance to 60ft with RW and modA x2 +1, chair follow for safety. Pt progressed to periods of Paramjit x2, but continues to require consistent cues for upright posture and proper management of AD. Pt with decreased weight shifting onto R LE and significant lateral lean to the L. Required multiple standing rest breaks and returned to room in chair. Continue to recommend PT skilled care and D/C to SNF.  -KG       Row Name 08/14/23 1140          Vital Signs    Pre Systolic BP Rehab 124  -KG     Pre Treatment Diastolic BP 67  -KG     Pretreatment Heart Rate (beats/min) 73  -KG     Posttreatment Heart Rate (beats/min) 82  -KG     Pre SpO2 (%) 96  -KG     O2 Delivery Pre Treatment supplemental O2  -KG     Post SpO2 (%) 97  -KG     O2 Delivery Post Treatment supplemental O2  -KG     Pre Patient Position Sitting  -KG     Intra Patient Position Standing  -KG     Post Patient Position Sitting  -KG       Row Name 08/14/23 1140          Positioning and Restraints    Pre-Treatment Position sitting in chair/recliner  -KG     Post Treatment Position chair  -KG     In Chair reclined;call light within reach;encouraged to call for assist;with family/caregiver;with nsg;RUE elevated;LUE elevated;legs elevated  -KG               User Key   (r) = Recorded By, (t) = Taken By, (c) = Cosigned By      Initials Name Provider Type    Chelsea Coyne PT Physical Therapist                   Outcome Measures       Row Name 08/14/23 1142          How much help from another person do you currently need...    Turning from your back to your side while in flat bed without using bedrails? 2  -KG     Moving from lying on back to sitting on the side of a flat bed without bedrails? 2  -KG     Moving to and from a bed to a chair (including a wheelchair)? 2  -KG     Standing up from a chair using your arms (e.g., wheelchair, bedside chair)? 2  -KG     Climbing 3-5 steps with a railing? 1  -KG     To walk in hospital room? 2  -KG     AM-PAC 6 Clicks Score (PT) 11  -KG     Highest level of mobility 4 --> Transferred to chair/commode  -KG       Row Name 08/14/23 1142          Functional Assessment    Outcome Measure Options AM-PAC 6 Clicks Basic Mobility (PT)  -KG               User Key  (r) = Recorded By, (t) = Taken By, (c) = Cosigned By      Initials Name Provider Type    Chelsea Coyne PT Physical Therapist                                 Physical Therapy Education       Title: PT OT SLP Therapies (In Progress)       Topic: Physical Therapy (In Progress)       Point: Mobility training (In Progress)       Learning Progress Summary             Patient Acceptance, E, NR by KG at 8/14/2023 0848    Acceptance, E, NR by KG at 8/12/2023 0936                         Point: Home exercise program (In Progress)       Learning Progress Summary             Patient Acceptance, E, NR by KG at 8/14/2023 0848                         Point: Body mechanics (In Progress)       Learning Progress Summary             Patient Acceptance, E, NR by KG at 8/14/2023 0848    Acceptance, E, NR by KG at 8/12/2023 0936                         Point: Precautions (In Progress)       Learning Progress Summary             Patient Acceptance, E, NR by KG at 8/14/2023 0848    Acceptance,  E, NR by KG at 8/12/2023 0936                                         User Key       Initials Effective Dates Name Provider Type Discipline    KG 05/22/20 -  Chelsea Contreras, PT Physical Therapist PT                  PT Recommendation and Plan  Planned Therapy Interventions (PT): balance training, bed mobility training, gait training, strengthening, transfer training  Plan of Care Reviewed With: patient  Progress: improving  Outcome Evaluation: Pt increased ambulation distance to 60ft with RW and modA x2 +1, chair follow for safety. Pt progressed to periods of Paramjit x2, but continues to require consistent cues for upright posture and proper management of AD. Pt with decreased weight shifting onto R LE and significant lateral lean to the L. Required multiple standing rest breaks and returned to room in chair. Continue to recommend PT skilled care and D/C to SNF.     Time Calculation:   PT Evaluation Complexity  History, PT Evaluation Complexity: 3 or more personal factors and/or comorbidities  Examination of Body Systems (PT Eval Complexity): total of 3 or more elements  Clinical Presentation (PT Evaluation Complexity): evolving  Clinical Decision Making (PT Evaluation Complexity): moderate complexity  Overall Complexity (PT Evaluation Complexity): moderate complexity     PT Charges       Row Name 08/14/23 0848             Time Calculation    Start Time 0848  -KG      PT Received On 08/14/23  -KG      PT Goal Re-Cert Due Date 08/22/23  -KG         Time Calculation- PT    Total Timed Code Minutes- PT 24 minute(s)  -KG         Timed Charges    77564 - PT Therapeutic Activity Minutes 24  -KG         Total Minutes    Timed Charges Total Minutes 24  -KG       Total Minutes 24  -KG                User Key  (r) = Recorded By, (t) = Taken By, (c) = Cosigned By      Initials Name Provider Type    KG Chelsea Contreras, PT Physical Therapist                  Therapy Charges for Today       Code Description Service Date  Service Provider Modifiers Qty    [id number]  PT THERAPEUTIC ACT EA 15 MIN 8/14/2023 Chelsea Contreras, PT GP 2            PT G-Codes  Outcome Measure Options: AM-PAC 6 Clicks Basic Mobility (PT)  AM-PAC 6 Clicks Score (PT): 11  PT Discharge Summary  Anticipated Discharge Disposition (PT): skilled nursing facility    Elana Contreras, NANDO  8/14/2023      Electronically signed by Chelsea Contreras, PT at 08/14/23 1143       Occupational Therapy Notes (most recent note)    No notes exist for this encounter.       Speech Language Pathology Notes (most recent note)    No notes exist for this encounter.

## 2023-08-15 NOTE — THERAPY EVALUATION
Patient Name: Dutch Arellano  : 1950    MRN: [id number]                              Today's Date: 8/15/2023       Admit Date: 2023    Visit Dx: No diagnosis found.  Patient Active Problem List   Diagnosis    Coronary artery disease involving native coronary artery of native heart    MV CAD    T2DM    HTN (hypertension)    Former smoker    Vapes nicotine containing substance    GERD     Past Medical History:   Diagnosis Date    Coronary artery disease     Diabetes mellitus     Former smoker 2023    HTN (hypertension) 2023    Hypertension     Vapes nicotine containing substance 2023     Past Surgical History:   Procedure Laterality Date    CORONARY ARTERY BYPASS GRAFT N/A 2023    Procedure: MEDIAN STERNOTOMY, CORONARY ARTERY BYPASS GRAFTING X 4 UTILIZING LEFT INTERNAL MAMMARY ARTERY, ENDOSCOPIC VEIN HARVEST OF LEFT GREATER SAPHENOUS VEIN, LEFT ATRIAL APPENDAGE LIGATION, TRANSESOPHAGEAL ECHOCARDIOGRAM PER ANESTHESIA;  Surgeon: Spenser Louise MD;  Location: [facility];  Service: Cardiothoracic;  Laterality: N/A;    LEFT HEART CATH        General Information       Row Name 08/15/23 1550          OT Time and Intention    Document Type evaluation  -     Mode of Treatment occupational therapy  -       Row Name 08/15/23 1557          General Information    Patient Profile Reviewed yes  -LC     Prior Level of Function independent:;all household mobility;transfer;ADL's  -LC     Existing Precautions/Restrictions cardiac;fall;oxygen therapy device and L/min;sternal;other (see comments)  Remote R AKA  -     Barriers to Rehab medically complex;previous functional deficit;cognitive status  -       Row Name 08/15/23 1555          Living Environment    People in Home spouse  -       Row Name 08/15/23 1556          Home Main Entrance    Number of Stairs, Main Entrance none  Ramp  -       Row Name 08/15/23 1553          Stairs Within Home, Primary    Number of Stairs, Within Home,  Primary none  -       Row Name 08/15/23 1550          Cognition    Orientation Status (Cognition) oriented x 3  -[facility] Name 08/15/23 1550          Safety Issues, Functional Mobility    Safety Issues Affecting Function (Mobility) awareness of need for assistance;insight into deficits/self-awareness;safety precaution awareness;safety precautions follow-through/compliance;sequencing abilities  -     Impairments Affecting Function (Mobility) balance;endurance/activity tolerance;pain;postural/trunk control;strength  -               User Key  (r) = Recorded By, (t) = Taken By, (c) = Cosigned By      Initials Name Provider Type     Delaney Camilo OT Occupational Therapist                     Mobility/ADL's       Row Name 08/15/23 1553          Bed Mobility    Bed Mobility scooting/bridging;supine-sit  -     Scooting/Bridging [place] (Bed Mobility) minimum assist (75% patient effort);verbal cues  -     Supine-Sit [place] (Bed Mobility) minimum assist (75% patient effort);verbal cues  -     Assistive Device (Bed Mobility) bed rails;head of bed elevated  -     Comment, (Bed Mobility) VC's to sequence and adhere to sternal precautions  -[facility] Name 08/15/23 1553          Transfers    Transfers sit-stand transfer  -     Comment, (Transfers) VC's for hand placement and sequencing.  -[facility] Name 08/15/23 1553          Sit-Stand Transfer    Sit-Stand [place] (Transfers) minimum assist (75% patient effort);verbal cues  -     Assistive Device (Sit-Stand Transfers) walker, front-wheeled  -[facility] Name 08/15/23 1553          Activities of Daily Living    BADL Assessment/Intervention lower body dressing;grooming  -[facility] Name 08/15/23 1553          Lower Body Dressing Assessment/Training    [place] Level (Lower Body Dressing) don;shoes/slippers;doff;minimum assist (75% patient effort)  -     Comment, (Lower Body Dressing) Pt. completed donning prosthetic with  increased time.  -       Row Name 08/15/23 1553          Grooming Assessment/Training    [place] Level (Grooming) grooming skills;set up  -               User Key  (r) = Recorded By, (t) = Taken By, (c) = Cosigned By      Initials Name Provider Type     Delaney Camilo OT Occupational Therapist                   Obj/Interventions       Row Name 08/15/23 1556          Sensory Assessment (Somatosensory)    Sensory Assessment (Somatosensory) UE sensation intact  -[facility] Name 08/15/23 1556          Vision Assessment/Intervention    Visual Impairment/Limitations corrective lenses for reading  -[facility] Name 08/15/23 1556          Range of Motion Comprehensive    General Range of Motion bilateral upper extremity ROM WFL  -[facility] Name 08/15/23 1556          Strength Comprehensive (MMT)    General Manual Muscle Testing (MMT) Assessment upper extremity strength deficits identified  -[facility] Name 08/15/23 1556          Upper Extremity (Manual Muscle Testing)    Upper Extremity: Manual Muscle Testing (MMT) left UE strength is WFL;right UE strength is WFL  -[facility] Name 08/15/23 1556          Motor Skills    Motor Skills functional endurance  -     Functional Endurance decreased activity tolerance  -[facility] Name 08/15/23 1556          Balance    Balance Assessment sitting static balance;sitting dynamic balance;standing static balance;standing dynamic balance  -     Static Sitting Balance contact guard  -     Dynamic Sitting Balance contact guard  -     Position, Sitting Balance unsupported;sitting edge of bed  -     Static Standing Balance minimal assist;verbal cues  -     Dynamic Standing Balance minimal assist;verbal cues  -     Position/Device Used, Standing Balance supported  -     Balance Interventions sitting;sit to stand;standing;supported;occupation based/functional task;weight shifting activity  -     Comment, Balance CGA for safety  -               User Key   (r) = Recorded By, (t) = Taken By, (c) = Cosigned By      Initials Name Provider Type    Delaney Flanagan OT Occupational Therapist                   Goals/Plan       Row Name 08/15/23 1601          Transfer Goal 1 (OT)    Activity/Assistive Device (Transfer Goal 1, OT) sit-to-stand/stand-to-sit;bed-to-chair/chair-to-bed  -     [place] Level/Cues Needed (Transfer Goal 1, OT) verbal cues required;standby assist  -     Time Frame (Transfer Goal 1, OT) long term goal (LTG);by discharge  -     Progress/Outcome (Transfer Goal 1, OT) goal ongoing  -       Row Name 08/15/23 1601          Dressing Goal 1 (OT)    Activity/Device (Dressing Goal 1, OT) lower body dressing  -     [place]/Cues Needed (Dressing Goal 1, OT) contact guard required;verbal cues required  -     Time Frame (Dressing Goal 1, OT) long term goal (LTG);by discharge  -     Progress/Outcome (Dressing Goal 1, OT) goal ongoing  -       Row Name 08/15/23 1601          Toileting Goal 1 (OT)    Activity/Device (Toileting Goal 1, OT) adjust/manage clothing;perform perineal hygiene;commode;grab bar/safety frame  -     [place] Level/Cues Needed (Toileting Goal 1, OT) contact guard required;verbal cues required  -     Time Frame (Toileting Goal 1, OT) long term goal (LTG);by discharge  -     Progress/Outcome (Toileting Goal 1, OT) goal ongoing  -               User Key  (r) = Recorded By, (t) = Taken By, (c) = Cosigned By      Initials Name Provider Type    Delaney Flanagan OT Occupational Therapist                   Clinical Impression       Row Name 08/15/23 1525          Pain Assessment    Pain Intervention(s) Repositioned;Ambulation/increased activity  -     Additional Documentation Pain Scale: FACES Pre/Post-Treatment (Group)  -       Row Name 08/15/23 1212          Pain Scale: FACES Pre/Post-Treatment    Pain: FACES Scale, Pretreatment 2-->hurts little bit  -     Posttreatment Pain Rating 2-->hurts little bit   -LC     Pain Location incisional  -     Pain Location - chest  -LC       Row Name 08/15/23 1557          Plan of Care Review    Plan of Care Reviewed With patient  -LC     Progress no change  -LC     Outcome Evaluation Pt. presents below baseline with ADLs and functional mobility. Limited by impaired activity tolerance, generalized weakness, and balance. Skilled OT services warranted to promote return to PLOF. Recommend IPR at discharge.  -       Row Name 08/15/23 1557          Therapy Assessment/Plan (OT)    Therapy Frequency (OT) daily  -LC       Row Name 08/15/23 0297          Therapy Plan Review/Discharge Plan (OT)    Anticipated Discharge Disposition (OT) inpatient rehabilitation facility  -       Row Name 08/15/23 1557          Vital Signs    Pre Systolic BP Rehab 124  -LC     Pre Treatment Diastolic BP 68  -LC     Pre SpO2 (%) 95  -LC     O2 Delivery Pre Treatment nasal cannula  -LC     O2 Delivery Intra Treatment nasal cannula  -     Post SpO2 (%) 96  -LC     Pre Patient Position Supine  -     Intra Patient Position Standing  -     Post Patient Position Sitting  -       Row Name 08/15/23 3878          Positioning and Restraints    Pre-Treatment Position in bed  -     Post Treatment Position chair  -LC     In Chair notified nsg;reclined;call light within reach;encouraged to call for assist;exit alarm on;waffle cushion;legs elevated;with family/caregiver  -               User Key  (r) = Recorded By, (t) = Taken By, (c) = Cosigned By      Initials Name Provider Type    LC Delaney Camilo, OT Occupational Therapist                   Outcome Measures       Row Name 08/15/23 8626          How much help from another is currently needed...    Putting on and taking off regular lower body clothing? 3  -LC     Bathing (including washing, rinsing, and drying) 2  -LC     Toileting (which includes using toilet bed pan or urinal) 2  -LC     Putting on and taking off regular upper body clothing 3  -LC      Taking care of personal grooming (such as brushing teeth) 3  -     Eating meals 4  -     AM-PAC 6 Clicks Score (OT) 17  -LC       Row Name 08/15/23 1427          How much help from another person do you currently need...    Turning from your back to your side while in flat bed without using bedrails? 3  -KW     Moving from lying on back to sitting on the side of a flat bed without bedrails? 3  -KW     Moving to and from a bed to a chair (including a wheelchair)? 3  -KW     Standing up from a chair using your arms (e.g., wheelchair, bedside chair)? 3  -KW     Climbing 3-5 steps with a railing? 2  -KW     To walk in hospital room? 3  -KW     AM-PAC 6 Clicks Score (PT) 17  -KW     Highest level of mobility 5 --> Static standing  -KW       Row Name 08/15/23 1605 08/15/23 1427       Functional Assessment    Outcome Measure Options AM-PAC 6 Clicks Daily Activity (OT)  - AM-PAC 6 Clicks Basic Mobility (PT)  -              User Key  (r) = Recorded By, (t) = Taken By, (c) = Cosigned By      Initials Name Provider Type    Delaney Flanagan, OT Occupational Therapist    KW Phylicia Bunch, PT Physical Therapist                    Occupational Therapy Education       Title: PT OT SLP Therapies (In Progress)       Topic: Occupational Therapy (In Progress)       Point: ADL training (In Progress)       Description:   Instruct learner(s) on proper safety adaptation and remediation techniques during self care or transfers.   Instruct in proper use of assistive devices.                  Learning Progress Summary             Patient Acceptance, E, NR by  at 8/15/2023 2835                         Point: Home exercise program (Not Started)       Description:   Instruct learner(s) on appropriate technique for monitoring, assisting and/or progressing therapeutic exercises/activities.                  Learner Progress:  Not documented in this visit.              Point: Precautions (In Progress)       Description:   Instruct  learner(s) on prescribed precautions during self-care and functional transfers.                  Learning Progress Summary             Patient Acceptance, E, NR by  at 8/15/2023 1505                         Point: Body mechanics (In Progress)       Description:   Instruct learner(s) on proper positioning and spine alignment during self-care, functional mobility activities and/or exercises.                  Learning Progress Summary             Patient Acceptance, E, NR by  at 8/15/2023 1505                                         User Key       Initials Effective Dates Name Provider Type Discipline     06/16/21 -  Delaney Camilo, [place] Occupational Therapist OT                  OT Recommendation and Plan  Therapy Frequency (OT): daily  Plan of Care Review  Plan of Care Reviewed With: patient  Progress: no change  Outcome Evaluation: Pt. presents below baseline with ADLs and functional mobility. Limited by impaired activity tolerance, generalized weakness, and balance. Skilled OT services warranted to promote return to PLOF. Recommend IPR at discharge.     Time Calculation:   Evaluation Complexity (OT)  Review Occupational Profile/Medical/Therapy History Complexity: brief/low complexity  Assessment, Occupational Performance/Identification of Deficit Complexity: 1-3 performance deficits  Clinical Decision Making Complexity (OT): problem focused assessment/low complexity  Overall Complexity of Evaluation (OT): low complexity     Time Calculation- OT       Row Name 08/15/23 1608 08/15/23 1427          Time Calculation- OT    OT Start Time 1505  - --     OT Received On 08/15/23  - --     OT Goal Re-Cert Due Date 08/25/23 - --        Timed Charges    26555 - Gait Training Minutes  -- 33  -KW        Untimed Charges    OT Eval/Re-eval Minutes 51  -LC --        Total Minutes    Timed Charges Total Minutes -- 33  -KW     Untimed Charges Total Minutes 51  -LC --      Total Minutes 51  -LC 33  -KW               User  Key  (r) = Recorded By, (t) = Taken By, (c) = Cosigned By      Initials Name Provider Type     Delaney Camilo, [place] Occupational Therapist    KW Phylicia Bunch PT Physical Therapist                  Therapy Charges for Today       Code Description Service Date Service Provider Modifiers Qty    [id number] HC OT EVAL LOW COMPLEXITY 4 8/15/2023 Delaney Camilo OT GO 1                 Delaney Camilo OT  8/15/2023

## 2023-08-15 NOTE — THERAPY TREATMENT NOTE
Patient Name: Dutch Arellano  : 1950    MRN: [id number]                              Today's Date: 8/15/2023       Admit Date: 2023    Visit Dx: No diagnosis found.  Patient Active Problem List   Diagnosis    Coronary artery disease involving native coronary artery of native heart    MV CAD    T2DM    HTN (hypertension)    Former smoker    Vapes nicotine containing substance    GERD     Past Medical History:   Diagnosis Date    Coronary artery disease     Diabetes mellitus     Former smoker 2023    HTN (hypertension) 2023    Hypertension     Vapes nicotine containing substance 2023     Past Surgical History:   Procedure Laterality Date    CORONARY ARTERY BYPASS GRAFT N/A 2023    Procedure: MEDIAN STERNOTOMY, CORONARY ARTERY BYPASS GRAFTING X 4 UTILIZING LEFT INTERNAL MAMMARY ARTERY, ENDOSCOPIC VEIN HARVEST OF LEFT GREATER SAPHENOUS VEIN, LEFT ATRIAL APPENDAGE LIGATION, TRANSESOPHAGEAL ECHOCARDIOGRAM PER ANESTHESIA;  Surgeon: Spenser Louise MD;  Location: [facility];  Service: Cardiothoracic;  Laterality: N/A;    LEFT HEART CATH        General Information       Row Name 08/15/23 1427          Physical Therapy Time and Intention    Document Type therapy note (daily note)  -KW     Mode of Treatment physical therapy  -       Row Name 08/15/23 1427          General Information    Patient Profile Reviewed yes  -KW     Existing Precautions/Restrictions cardiac;fall;oxygen therapy device and L/min;sternal;other (see comments)  remote R AKA with prosthesis; confusion  -       Row Name 08/15/23 1427          Safety Issues, Functional Mobility    Impairments Affecting Function (Mobility) balance;cognition;coordination;endurance/activity tolerance;motor control;motor planning;pain;postural/trunk control;shortness of breath;strength  -KW               User Key  (r) = Recorded By, (t) = Taken By, (c) = Cosigned By      Initials Name Provider Type    KW Phylicia Bunch PT Physical  Therapist                   Mobility       Row Name 08/15/23 1427          Bed Mobility    Bed Mobility supine-sit  -KW     Supine-Sit [place] (Bed Mobility) minimum assist (75% patient effort)  -KW     Assistive Device (Bed Mobility) bed rails;head of bed elevated  -KW       Row Name 08/15/23 1427          Transfers    Comment, (Transfers) patient required cues for sternal precautions  -KW       Row Name 08/15/23 1427          Sit-Stand Transfer    Sit-Stand [place] (Transfers) minimum assist (75% patient effort);verbal cues  -KW       [facility] Name 08/15/23 1427          Gait/Stairs (Locomotion)    [place] Level (Gait) 2 person assist;1 person to manage equipment;verbal cues;minimum assist (75% patient effort)  -KW     Assistive Device (Gait) walker, front-wheeled  -KW     Distance in Feet (Gait) 150  -KW     Deviations/Abnormal Patterns (Gait) bilateral deviations;eliezer decreased;stride length decreased  -KW     Bilateral Gait Deviations forward flexed posture;heel strike decreased  -KW     Left Sided Gait Deviations leans left  -KW     Right Sided Gait Deviations weight shift ability decreased  -KW               User Key  (r) = Recorded By, (t) = Taken By, (c) = Cosigned By      Initials Name Provider Type    KW Phylicia Bunch, PT Physical Therapist                   Obj/Interventions    No documentation.                  Goals/Plan    No documentation.                  Clinical Impression       [facility] Name 08/15/23 1427          Pain    Pretreatment Pain Rating 0/10 - no pain  -KW       Row Name 08/15/23 1427          Plan of Care Review    Plan of Care Reviewed With patient  -KW     Progress no change  -KW     Outcome Evaluation Physical therapy treatment complete. The patient continues to require repeated cues to maintain sternal precautions. Patient improved tolerance to mobility. Patient increased ambulation distance compared to previous treatment session, though continues to demonstrate  decreased weight shifitng to R side. This may be due to isues with prosthetic LE as patient stated that kept slipping in leg, his seal was not correct. The patient continues to present below baseline for functional mobility. The patient would continue to benefit from skilled PT to address strength, balance and activity tolerance deficits. Continue to current PT POC  -KW       Row Name 08/15/23 1427          Vital Signs    Pre Systolic BP Rehab 124  -KW     Pre Treatment Diastolic BP 68  -KW     Pretreatment Heart Rate (beats/min) 71  -KW     Pre SpO2 (%) 95  -KW       Row Name 08/15/23 1427          Positioning and Restraints    Pre-Treatment Position in bed  -KW     Post Treatment Position chair  -KW     In Chair reclined;call light within reach;encouraged to call for assist;exit alarm on;with family/caregiver;legs elevated  -KW               User Key  (r) = Recorded By, (t) = Taken By, (c) = Cosigned By      Initials Name Provider Type    Phylicia Welch PT Physical Therapist                   Outcome Measures       Row Name 08/15/23 1427          How much help from another person do you currently need...    Turning from your back to your side while in flat bed without using bedrails? 3  -KW     Moving from lying on back to sitting on the side of a flat bed without bedrails? 3  -KW     Moving to and from a bed to a chair (including a wheelchair)? 3  -KW     Standing up from a chair using your arms (e.g., wheelchair, bedside chair)? 3  -KW     Climbing 3-5 steps with a railing? 2  -KW     To walk in hospital room? 3  -KW     AM-PAC 6 Clicks Score (PT) 17  -KW     Highest level of mobility 5 --> Static standing  -KW       Row Name 08/15/23 1427          Functional Assessment    Outcome Measure Options AM-PAC 6 Clicks Basic Mobility (PT)  -KW               User Key  (r) = Recorded By, (t) = Taken By, (c) = Cosigned By      Initials Name Provider Type    Phylicia Welch PT Physical Therapist                                  Physical Therapy Education       Title: PT OT SLP Therapies (In Progress)       Topic: Physical Therapy (In Progress)       Point: Mobility training (In Progress)       Learning Progress Summary             Patient Acceptance, E, NR by KG at 8/14/2023 0848    Acceptance, E, NR by KG at 8/12/2023 0936                         Point: Home exercise program (In Progress)       Learning Progress Summary             Patient Acceptance, E, NR by KG at 8/14/2023 0848                         Point: Body mechanics (In Progress)       Learning Progress Summary             Patient Acceptance, E, NR by KG at 8/14/2023 0848    Acceptance, E, NR by KG at 8/12/2023 0936                         Point: Precautions (In Progress)       Learning Progress Summary             Patient Acceptance, E, NR by KG at 8/14/2023 0848    Acceptance, E, NR by KG at 8/12/2023 0936                                         User Key       Initials Effective Dates Name Provider Type Discipline    KG 05/22/20 -  Chelsea Contreras, PT Physical Therapist PT                  PT Recommendation and Plan     Plan of Care Reviewed With: patient  Progress: no change  Outcome Evaluation: Physical therapy treatment complete. The patient continues to require repeated cues to maintain sternal precautions. Patient improved tolerance to mobility. Patient increased ambulation distance compared to previous treatment session, though continues to demonstrate decreased weight shifitng to R side. This may be due to isues with prosthetic LE as patient stated that kept slipping in leg, his seal was not correct. The patient continues to present below baseline for functional mobility. The patient would continue to benefit from skilled PT to address strength, balance and activity tolerance deficits. Continue to current PT POC     Time Calculation:         PT Charges       Row Name 08/15/23 5088             Time Calculation    Start Time 1427  -KW      PT  Received On 08/15/23  -KW      PT Goal Re-Cert Due Date 08/22/23  -KW         Timed Charges    57413 - Gait Training Minutes  33  -KW         Total Minutes    Timed Charges Total Minutes 33  -KW       Total Minutes 33  -KW                User Key  (r) = Recorded By, (t) = Taken By, (c) = Cosigned By      Initials Name Provider Type    Phylicia Welch, NANDO Physical Therapist                  Therapy Charges for Today       Code Description Service Date Service Provider Modifiers Qty    52163982694 HC GAIT TRAINING EA 15 MIN 8/15/2023 Phylicia Bunch, NANDO GP 2            PT G-Codes  Outcome Measure Options: AM-PAC 6 Clicks Basic Mobility (PT)  AM-PAC 6 Clicks Score (PT): 17  PT Discharge Summary  Anticipated Discharge Disposition (PT): inpatient rehabilitation facility    Phylicia Bunch PT  8/15/2023

## 2023-08-15 NOTE — PROGRESS NOTES
[facility] Cardiothoracic Surgery In-Patient Progress Note    POD # 4 s/p CABG x4, LAAL     LOS: 5 days       Subjective  Transferred to telemetry floor yesterday.  Denies chest pain or shortness of breath.      Objective  Vital Signs  Temp:  [97.6 øF (36.4 øC)-98 øF (36.7 øC)] 97.9 øF (36.6 øC)  Heart Rate:  [68-76] 68  Resp:  [16-18] 16  BP: (102-145)/(54-80) 114/65        Physical Exam:   General Appearance: alert, appears stated age and cooperative   Lungs: Diminished bases bilaterally   Heart: regular rhythm & normal rate, normal S1, S2, no murmur, no gallop, no rub, and no click   Skin: Incision c/d/I, staples present to EVH site   Sternum: Stable      Results     Results from last 7 days   Lab Units 08/15/23  0502 08/14/23  0231   WBC 10*3/mm3  --  10.07   HEMOGLOBIN g/dL 8.6* 8.6*   HEMATOCRIT % 25.2* 26.2*   PLATELETS 10*3/mm3  --  126*     Results from last 7 days   Lab Units 08/15/23  0502   SODIUM mmol/L 137   POTASSIUM mmol/L 4.2   CHLORIDE mmol/L 100   CO2 mmol/L 28.0   BUN mg/dL 22   CREATININE mg/dL 0.89   GLUCOSE mg/dL 112*   CALCIUM mg/dL 8.7       Assessment  POD # 4 s/p CABG x4, LAAL    Plan   CBC, BMP, chest x-ray in the morning  Afib management per cardiology  Ambulate  Pulmonary Toilet: IS q1 hr while awake  ASA, Statin, BB, Plavix  Case management to work on SNF placement    Joyce Paz, JANIE  08/15/23  08:08 EDT

## 2023-08-15 NOTE — PROGRESS NOTES
Pt. Referred for Phase II Cardiac Rehab. Staff discussed benefits of exercise, program protocol, and educational material provided. Teach back verified.  Permission granted from patient for staff to fax referral information to outlying program at this time.  Staff faxed referral info to [facility].

## 2023-08-16 ENCOUNTER — APPOINTMENT (OUTPATIENT)
Dept: GENERAL RADIOLOGY | Facility: HOSPITAL | Age: 73
DRG: 236 | End: 2023-08-16
Payer: OTHER GOVERNMENT

## 2023-08-16 LAB
ANION GAP SERPL CALCULATED.3IONS-SCNC: 7 MMOL/L (ref 5–15)
BUN SERPL-MCNC: 16 MG/DL (ref 8–23)
BUN/CREAT SERPL: 21.9 (ref 7–25)
CALCIUM SPEC-SCNC: 8.8 MG/DL (ref 8.6–10.5)
CHLORIDE SERPL-SCNC: 101 MMOL/L (ref 98–107)
CO2 SERPL-SCNC: 28 MMOL/L (ref 22–29)
CREAT SERPL-MCNC: 0.73 MG/DL (ref 0.76–1.27)
DEPRECATED RDW RBC AUTO: 45.3 FL (ref 37–54)
EGFRCR SERPLBLD CKD-EPI 2021: 96.7 ML/MIN/1.73
ERYTHROCYTE [DISTWIDTH] IN BLOOD BY AUTOMATED COUNT: 13.5 % (ref 12.3–15.4)
GLUCOSE BLDC GLUCOMTR-MCNC: 131 MG/DL (ref 70–130)
GLUCOSE BLDC GLUCOMTR-MCNC: 134 MG/DL (ref 70–130)
GLUCOSE BLDC GLUCOMTR-MCNC: 138 MG/DL (ref 70–130)
GLUCOSE BLDC GLUCOMTR-MCNC: 150 MG/DL (ref 70–130)
GLUCOSE SERPL-MCNC: 115 MG/DL (ref 65–99)
HCT VFR BLD AUTO: 25.9 % (ref 37.5–51)
HGB BLD-MCNC: 8.8 G/DL (ref 13–17.7)
MCH RBC QN AUTO: 32.1 PG (ref 26.6–33)
MCHC RBC AUTO-ENTMCNC: 34 G/DL (ref 31.5–35.7)
MCV RBC AUTO: 94.5 FL (ref 79–97)
PLATELET # BLD AUTO: 225 10*3/MM3 (ref 140–450)
PMV BLD AUTO: 10.1 FL (ref 6–12)
POTASSIUM SERPL-SCNC: 3.8 MMOL/L (ref 3.5–5.2)
POTASSIUM SERPL-SCNC: 4 MMOL/L (ref 3.5–5.2)
RBC # BLD AUTO: 2.74 10*6/MM3 (ref 4.14–5.8)
SODIUM SERPL-SCNC: 136 MMOL/L (ref 136–145)
WBC NRBC COR # BLD: 7.48 10*3/MM3 (ref 3.4–10.8)

## 2023-08-16 PROCEDURE — 71045 X-RAY EXAM CHEST 1 VIEW: CPT

## 2023-08-16 PROCEDURE — 84132 ASSAY OF SERUM POTASSIUM: CPT | Performed by: PHYSICIAN ASSISTANT

## 2023-08-16 PROCEDURE — 99232 SBSQ HOSP IP/OBS MODERATE 35: CPT | Performed by: INTERNAL MEDICINE

## 2023-08-16 PROCEDURE — 80048 BASIC METABOLIC PNL TOTAL CA: CPT

## 2023-08-16 PROCEDURE — 99024 POSTOP FOLLOW-UP VISIT: CPT

## 2023-08-16 PROCEDURE — 85027 COMPLETE CBC AUTOMATED: CPT

## 2023-08-16 PROCEDURE — 63710000001 INSULIN DETEMIR PER 5 UNITS: Performed by: STUDENT IN AN ORGANIZED HEALTH CARE EDUCATION/TRAINING PROGRAM

## 2023-08-16 PROCEDURE — 82948 REAGENT STRIP/BLOOD GLUCOSE: CPT

## 2023-08-16 RX ORDER — FERROUS SULFATE 325(65) MG
325 TABLET ORAL
Status: DISCONTINUED | OUTPATIENT
Start: 2023-08-17 | End: 2023-08-17 | Stop reason: HOSPADM

## 2023-08-16 RX ORDER — POTASSIUM CHLORIDE 7.45 MG/ML
10 INJECTION INTRAVENOUS
Status: ACTIVE | OUTPATIENT
Start: 2023-08-16 | End: 2023-08-16

## 2023-08-16 RX ADMIN — Medication 10 ML: at 21:55

## 2023-08-16 RX ADMIN — SENNOSIDES AND DOCUSATE SODIUM 2 TABLET: 50; 8.6 TABLET ORAL at 09:20

## 2023-08-16 RX ADMIN — ATORVASTATIN CALCIUM 40 MG: 40 TABLET, FILM COATED ORAL at 21:54

## 2023-08-16 RX ADMIN — CLOPIDOGREL BISULFATE 75 MG: 75 TABLET ORAL at 09:20

## 2023-08-16 RX ADMIN — HYDROCODONE BITARTRATE AND ACETAMINOPHEN 1 TABLET: 7.5; 325 TABLET ORAL at 14:34

## 2023-08-16 RX ADMIN — CARVEDILOL 12.5 MG: 12.5 TABLET, FILM COATED ORAL at 18:18

## 2023-08-16 RX ADMIN — ACETAMINOPHEN 650 MG: 325 TABLET ORAL at 05:25

## 2023-08-16 RX ADMIN — PANTOPRAZOLE SODIUM 40 MG: 40 TABLET, DELAYED RELEASE ORAL at 05:25

## 2023-08-16 RX ADMIN — AMIODARONE HYDROCHLORIDE 200 MG: 200 TABLET ORAL at 14:34

## 2023-08-16 RX ADMIN — AMIODARONE HYDROCHLORIDE 200 MG: 200 TABLET ORAL at 05:25

## 2023-08-16 RX ADMIN — GABAPENTIN 300 MG: 300 CAPSULE ORAL at 09:20

## 2023-08-16 RX ADMIN — ASPIRIN 81 MG: 81 TABLET, COATED ORAL at 09:20

## 2023-08-16 RX ADMIN — GABAPENTIN 300 MG: 300 CAPSULE ORAL at 21:48

## 2023-08-16 RX ADMIN — ACETAMINOPHEN 650 MG: 325 TABLET ORAL at 21:49

## 2023-08-16 RX ADMIN — AMIODARONE HYDROCHLORIDE 200 MG: 200 TABLET ORAL at 21:48

## 2023-08-16 RX ADMIN — INSULIN DETEMIR 15 UNITS: 100 INJECTION, SOLUTION SUBCUTANEOUS at 21:54

## 2023-08-16 RX ADMIN — Medication 10 ML: at 09:21

## 2023-08-16 RX ADMIN — CARVEDILOL 12.5 MG: 12.5 TABLET, FILM COATED ORAL at 09:20

## 2023-08-16 NOTE — DISCHARGE PLACEMENT REQUEST
"Dutch Barlow (73 y.o. Male) From Manuel Humphreys RN CM [id number]      Date of Birth   1950    Social Security Number       Address   [address] [place] [id number]    Home Phone   [phone]    MRN   [id number]       [facility]    Marital Status                               Admission Date   8/9/23    Admission Type   Elective    Admitting Provider   Spenser Louise MD    Attending Provider   Spenser Louise MD    Department, Room/Bed   [address], S486/1       Discharge Date       Discharge Disposition       Discharge Destination                                 Attending Provider: Spenser Louise MD    Allergies: No Known Allergies    Isolation: None   Infection: None   Code Status: CPR    Ht: 180.3 cm (71\")   Wt: 91.4 kg (201 lb 8 oz)    Admission Cmt: None   Principal Problem: None                  Active Insurance as of 8/9/2023       Primary Coverage       Payor Plan Insurance Group Employer/Plan Group    MEDICARE MEDICARE A & B        Payor Plan Address Payor Plan Phone Number Payor Plan Fax Number Effective Dates    [address] [phone]  11/1/2012 - None Entered    [facility] [id number]         Subscriber Name Subscriber Birth Date Member ID       DUTCH BARLOW 1950 [id number]               Secondary Coverage       Payor Plan Insurance Group Employer/Plan Group    [facility] OPTUM        Payor Plan Address Payor Plan Phone Number Payor Plan Fax Number Effective Dates    [address] [phone]  8/7/2023 - None Entered    [facility] [address]         Subscriber Name Subscriber Birth Date Member ID       DUTCH BARLOW 1950 [id number]                     Emergency Contacts        (Rel.) Home Phone Work Phone Mobile Phone    BRUNILDA BARLOW (Spouse) [phone]                 Occupational Therapy Notes (most recent note)        Delaney Camilo, OT at 08/15/23 1505  "         Patient Name: Dutch Arellano  : 1950    MRN: [id number]                              Today's Date: 8/15/2023       Admit Date: 2023    Visit Dx: No diagnosis found.  Patient Active Problem List   Diagnosis    Coronary artery disease involving native coronary artery of native heart    MV CAD    T2DM    HTN (hypertension)    Former smoker    Vapes nicotine containing substance    GERD     Past Medical History:   Diagnosis Date    Coronary artery disease     Diabetes mellitus     Former smoker 2023    HTN (hypertension) 2023    Hypertension     Vapes nicotine containing substance 2023     Past Surgical History:   Procedure Laterality Date    CORONARY ARTERY BYPASS GRAFT N/A 2023    Procedure: MEDIAN STERNOTOMY, CORONARY ARTERY BYPASS GRAFTING X 4 UTILIZING LEFT INTERNAL MAMMARY ARTERY, ENDOSCOPIC VEIN HARVEST OF LEFT GREATER SAPHENOUS VEIN, LEFT ATRIAL APPENDAGE LIGATION, TRANSESOPHAGEAL ECHOCARDIOGRAM PER ANESTHESIA;  Surgeon: Spenser Louise MD;  [facility];  Service: Cardiothoracic;  Laterality: N/A;    LEFT HEART CATH        General Information       Row Name 08/15/23 1550          OT Time and Intention    Document Type evaluation  -     Mode of Treatment occupational therapy  -       Row Name 08/15/23 1555          General Information    Patient Profile Reviewed yes  -LC     Prior Level of Function independent:;all household mobility;transfer;ADL's  -LC     Existing Precautions/Restrictions cardiac;fall;oxygen therapy device and L/min;sternal;other (see comments)  Remote R AKA  -     Barriers to Rehab medically complex;previous functional deficit;cognitive status  -LC       Row Name 08/15/23 1554          Living Environment    People in Home spouse  -       Row Name 08/15/23 1557          Home Main Entrance    Number of Stairs, Main Entrance none  Ramp  -       Row Name 08/15/23 1551          Stairs Within Home, Primary    Number of Stairs, Within  Home, Primary none  -[facility] Name 08/15/23 1550          Cognition    Orientation Status (Cognition) oriented x 3  -[facility] Name 08/15/23 1550          Safety Issues, Functional Mobility    Safety Issues Affecting Function (Mobility) awareness of need for assistance;insight into deficits/self-awareness;safety precaution awareness;safety precautions follow-through/compliance;sequencing abilities  -     Impairments Affecting Function (Mobility) balance;endurance/activity tolerance;pain;postural/trunk control;strength  -               User Key  (r) = Recorded By, (t) = Taken By, (c) = Cosigned By      Initials Name Provider Type     Delaney Camilo OT Occupational Therapist                     Mobility/ADL's       [facility] Name 08/15/23 1553          Bed Mobility    Bed Mobility scooting/bridging;supine-sit  -     Scooting/Bridging [place] (Bed Mobility) minimum assist (75% patient effort);verbal cues  -     Supine-Sit [place] (Bed Mobility) minimum assist (75% patient effort);verbal cues  -     Assistive Device (Bed Mobility) bed rails;head of bed elevated  -     Comment, (Bed Mobility) VC's to sequence and adhere to sternal precautions  -[facility] Name 08/15/23 1553          Transfers    Transfers sit-stand transfer  -     Comment, (Transfers) VC's for hand placement and sequencing.  -[facility] Name 08/15/23 1553          Sit-Stand Transfer    Sit-Stand [place] (Transfers) minimum assist (75% patient effort);verbal cues  -     Assistive Device (Sit-Stand Transfers) walker, front-wheeled  -[facility] Name 08/15/23 1553          Activities of Daily Living    BADL Assessment/Intervention lower body dressing;grooming  -[facility] Name 08/15/23 1553          Lower Body Dressing Assessment/Training    [place] Level (Lower Body Dressing) don;shoes/slippers;doff;minimum assist (75% patient effort)  -     Comment, (Lower Body Dressing) Pt. completed donning prosthetic  with increased time.  -       Row Name 08/15/23 1553          Grooming Assessment/Training    [place] Level (Grooming) grooming skills;set up  -               User Key  (r) = Recorded By, (t) = Taken By, (c) = Cosigned By      Initials Name Provider Type     Delaney Camilo OT Occupational Therapist                   Obj/Interventions       Row Name 08/15/23 1556          Sensory Assessment (Somatosensory)    Sensory Assessment (Somatosensory) UE sensation intact  -[facility] Name 08/15/23 1556          Vision Assessment/Intervention    Visual Impairment/Limitations corrective lenses for reading  -       Row Name 08/15/23 1556          Range of Motion Comprehensive    General Range of Motion bilateral upper extremity ROM WFL  -[facility] Name 08/15/23 1556          Strength Comprehensive (MMT)    General Manual Muscle Testing (MMT) Assessment upper extremity strength deficits identified  -[facility] Name 08/15/23 1556          Upper Extremity (Manual Muscle Testing)    Upper Extremity: Manual Muscle Testing (MMT) left UE strength is WFL;right UE strength is WFL  -[facility] Name 08/15/23 1556          Motor Skills    Motor Skills functional endurance  -     Functional Endurance decreased activity tolerance  -[facility] Name 08/15/23 1556          Balance    Balance Assessment sitting static balance;sitting dynamic balance;standing static balance;standing dynamic balance  -     Static Sitting Balance contact guard  -     Dynamic Sitting Balance contact guard  -     Position, Sitting Balance unsupported;sitting edge of bed  -     Static Standing Balance minimal assist;verbal cues  -     Dynamic Standing Balance minimal assist;verbal cues  -     Position/Device Used, Standing Balance supported  -     Balance Interventions sitting;sit to stand;standing;supported;occupation based/functional task;weight shifting activity  -     Comment, Balance CGA for safety  -                User Key  (r) = Recorded By, (t) = Taken By, (c) = Cosigned By      Initials Name Provider Type     Delaney Camilo OT Occupational Therapist                   Goals/Plan       Row Name 08/15/23 1601          Transfer Goal 1 (OT)    Activity/Assistive Device (Transfer Goal 1, OT) sit-to-stand/stand-to-sit;bed-to-chair/chair-to-bed  -     [place] Level/Cues Needed (Transfer Goal 1, OT) verbal cues required;standby assist  -     Time Frame (Transfer Goal 1, OT) long term goal (LTG);by discharge  -     Progress/Outcome (Transfer Goal 1, OT) goal ongoing  -       Row Name 08/15/23 1601          Dressing Goal 1 (OT)    Activity/Device (Dressing Goal 1, OT) lower body dressing  -     [place]/Cues Needed (Dressing Goal 1, OT) contact guard required;verbal cues required  -     Time Frame (Dressing Goal 1, OT) long term goal (LTG);by discharge  -     Progress/Outcome (Dressing Goal 1, OT) goal ongoing  -       Row Name 08/15/23 1601          Toileting Goal 1 (OT)    Activity/Device (Toileting Goal 1, OT) adjust/manage clothing;perform perineal hygiene;commode;grab bar/safety frame  -     [place] Level/Cues Needed (Toileting Goal 1, OT) contact guard required;verbal cues required  -     Time Frame (Toileting Goal 1, OT) long term goal (LTG);by discharge  -     Progress/Outcome (Toileting Goal 1, OT) goal ongoing  -               User Key  (r) = Recorded By, (t) = Taken By, (c) = Cosigned By      Initials Name Provider Type    Delaney Flanagan OT Occupational Therapist                   Clinical Impression       Row Name 08/15/23 6174          Pain Assessment    Pain Intervention(s) Repositioned;Ambulation/increased activity  -     Additional Documentation Pain Scale: FACES Pre/Post-Treatment (Group)  -       Row Name 08/15/23 0789          Pain Scale: FACES Pre/Post-Treatment    Pain: FACES Scale, Pretreatment 2-->hurts little bit  -     Posttreatment Pain Rating 2-->hurts  little bit  -LC     Pain Location incisional  -     Pain Location - chest  -LC       Row Name 08/15/23 2467          Plan of Care Review    Plan of Care Reviewed With patient  -LC     Progress no change  -LC     Outcome Evaluation Pt. presents below baseline with ADLs and functional mobility. Limited by impaired activity tolerance, generalized weakness, and balance. Skilled OT services warranted to promote return to PLOF. Recommend IPR at discharge.  -       Row Name 08/15/23 6457          Therapy Assessment/Plan (OT)    Therapy Frequency (OT) daily  -       Row Name 08/15/23 9347          Therapy Plan Review/Discharge Plan (OT)    Anticipated Discharge Disposition (OT) inpatient rehabilitation facility  -       Row Name 08/15/23 8454          Vital Signs    Pre Systolic BP Rehab 124  -LC     Pre Treatment Diastolic BP 68  -LC     Pre SpO2 (%) 95  -LC     O2 Delivery Pre Treatment nasal cannula  -     O2 Delivery Intra Treatment nasal cannula  -     Post SpO2 (%) 96  -LC     Pre Patient Position Supine  -     Intra Patient Position Standing  -     Post Patient Position Sitting  -       Row Name 08/15/23 4220          Positioning and Restraints    Pre-Treatment Position in bed  -     Post Treatment Position chair  -LC     In Chair notified nsg;reclined;call light within reach;encouraged to call for assist;exit alarm on;waffle cushion;legs elevated;with family/caregiver  -               User Key  (r) = Recorded By, (t) = Taken By, (c) = Cosigned By      Initials Name Provider Type     Delaney Camilo, SOLEDAD Occupational Therapist                   Outcome Measures       Row Name 08/15/23 5729          How much help from another is currently needed...    Putting on and taking off regular lower body clothing? 3  -LC     Bathing (including washing, rinsing, and drying) 2  -LC     Toileting (which includes using toilet bed pan or urinal) 2  -LC     Putting on and taking off regular upper body clothing  3  -LC     Taking care of personal grooming (such as brushing teeth) 3  -LC     Eating meals 4  -LC     AM-PAC 6 Clicks Score (OT) 17  -LC       Row Name 08/15/23 1427          How much help from another person do you currently need...    Turning from your back to your side while in flat bed without using bedrails? 3  -KW     Moving from lying on back to sitting on the side of a flat bed without bedrails? 3  -KW     Moving to and from a bed to a chair (including a wheelchair)? 3  -KW     Standing up from a chair using your arms (e.g., wheelchair, bedside chair)? 3  -KW     Climbing 3-5 steps with a railing? 2  -KW     To walk in hospital room? 3  -KW     AM-PAC 6 Clicks Score (PT) 17  -KW     Highest level of mobility 5 --> Static standing  -KW       Row Name 08/15/23 1605 08/15/23 1427       Functional Assessment    Outcome Measure Options AM-PAC 6 Clicks Daily Activity (OT)  - AM-PAC 6 Clicks Basic Mobility (PT)  -              User Key  (r) = Recorded By, (t) = Taken By, (c) = Cosigned By      Initials Name Provider Type    Delaney Flanagan, OT Occupational Therapist    KW Phylicia Bunch, PT Physical Therapist                    Occupational Therapy Education       Title: PT OT SLP Therapies (In Progress)       Topic: Occupational Therapy (In Progress)       Point: ADL training (In Progress)       Description:   Instruct learner(s) on proper safety adaptation and remediation techniques during self care or transfers.   Instruct in proper use of assistive devices.                  Learning Progress Summary             Patient Acceptance, E, NR by  at 8/15/2023 1505                         Point: Home exercise program (Not Started)       Description:   Instruct learner(s) on appropriate technique for monitoring, assisting and/or progressing therapeutic exercises/activities.                  Learner Progress:  Not documented in this visit.              Point: Precautions (In Progress)       Description:    Instruct learner(s) on prescribed precautions during self-care and functional transfers.                  Learning Progress Summary             Patient Acceptance, E, NR by  at 8/15/2023 1505                         Point: Body mechanics (In Progress)       Description:   Instruct learner(s) on proper positioning and spine alignment during self-care, functional mobility activities and/or exercises.                  Learning Progress Summary             Patient Acceptance, E, NR by  at 8/15/2023 1505                                         User Key       Initials Effective Dates Name Provider Type Discipline     06/16/21 -  Delaney Camilo, SOLEDAD Occupational Therapist OT                  OT Recommendation and Plan  Therapy Frequency (OT): daily  Plan of Care Review  Plan of Care Reviewed With: patient  Progress: no change  Outcome Evaluation: Pt. presents below baseline with ADLs and functional mobility. Limited by impaired activity tolerance, generalized weakness, and balance. Skilled OT services warranted to promote return to PLOF. Recommend IPR at discharge.     Time Calculation:   Evaluation Complexity (OT)  Review Occupational Profile/Medical/Therapy History Complexity: brief/low complexity  Assessment, Occupational Performance/Identification of Deficit Complexity: 1-3 performance deficits  Clinical Decision Making Complexity (OT): problem focused assessment/low complexity  Overall Complexity of Evaluation (OT): low complexity     Time Calculation- OT       Row Name 08/15/23 1608 08/15/23 1427          Time Calculation- OT    OT Start Time 1505  - --     OT Received On 08/15/23  - --     OT Goal Re-Cert Due Date 08/25/23 - --        Timed Charges    29396 - Gait Training Minutes  -- 33  -KW        Untimed Charges    OT Eval/Re-eval Minutes 51  - --        Total Minutes    Timed Charges Total Minutes -- 33  -KW     Untimed Charges Total Minutes 51  - --      Total Minutes 51  -LC 33  -KW                User Key  (r) = Recorded By, (t) = Taken By, (c) = Cosigned By      Initials Name Provider Type     Delaney Camilo, SOLEDAD Occupational Therapist    Phylicia Welch PT Physical Therapist                  Therapy Charges for Today       Code Description Service Date Service Provider Modifiers Qty    [id number]  OT EVAL LOW COMPLEXITY 4 8/15/2023 Delaney Camilo OT GO 1                 Delaney Camilo OT  8/15/2023    Electronically signed by Delaney Camilo OT at 08/15/23 1615

## 2023-08-16 NOTE — PROGRESS NOTES
[facility] Cardiothoracic Surgery In-Patient Progress Note    POD # 5 s/p CABG x4, LAAL     LOS: 6 days       Subjective  Sitting on edge of bed. Asking when he can go home tells me he needs to take care of his dogs. Denies chest pain or dyspnea.    Objective  Vital Signs  Temp:  [97.8 øF (36.6 øC)-98.3 øF (36.8 øC)] 97.8 øF (36.6 øC)  Heart Rate:  [68-74] 73  Resp:  [15-18] 18  BP: (116-135)/(64-74) 126/74        Physical Exam:   General Appearance: alert, appears stated age and cooperative   Lungs: Diminished bases bilaterally   Heart: regular rhythm & normal rate, normal S1, S2, no murmur, no gallop, no rub, and no click   Skin: Incision c/d/I, staples present to [facility] site   Sternum: Stable      Results     Results from last 7 days   Lab Units 08/15/23  0502 08/14/23  0231   WBC 10*3/mm3  --  10.07   HEMOGLOBIN g/dL 8.6* 8.6*   HEMATOCRIT % 25.2* 26.2*   PLATELETS 10*3/mm3  --  126*       Results from last 7 days   Lab Units 08/15/23  0502   SODIUM mmol/L 137   POTASSIUM mmol/L 4.2   CHLORIDE mmol/L 100   CO2 mmol/L 28.0   BUN mg/dL 22   CREATININE mg/dL 0.89   GLUCOSE mg/dL 112*   CALCIUM mg/dL 8.7         Assessment  POD # 5 s/p CABG x4, LAAL    Plan   Afib management per cardiology  Ambulate  Pulmonary Toilet: IS q1 hr while awake  ASA, Statin, BB, Plavix  Case management to work on SNF placement-medically ready    Joyce Paz, APRN  08/16/23  07:40 EDT

## 2023-08-16 NOTE — DISCHARGE PLACEMENT REQUEST
"Dutch Barlow (73 y.o. Male) From Manuel Humphreys RN CM [id number]      Date of Birth   1950    Social Security Number       Address   [address] [place] [id number]    Home Phone   [phone]    MRN   [id number]       [facility]    Marital Status                               Admission Date   8/9/23    Admission Type   Elective    Admitting Provider   Spenser Louise MD    Attending Provider   Spenser Louise MD    Department, Room/Bed   [address], S[address]/1       Discharge Date       Discharge Disposition       Discharge Destination                                 Attending Provider: Spenser Louise MD    Allergies: No Known Allergies    Isolation: None   Infection: None   Code Status: CPR    Ht: 180.3 cm (71\")   Wt: 91.4 kg (201 lb 8 oz)    Admission Cmt: None   Principal Problem: None                  Active Insurance as of 8/9/2023       Primary Coverage       Payor Plan Insurance Group Employer/Plan Group    MEDICARE MEDICARE A & B        Payor Plan Address Payor Plan Phone Number Payor Plan Fax Number Effective Dates    [address] [phone]  11/1/2012 - None Entered    [facility] [address]         Subscriber Name Subscriber Birth Date Member ID       DUTCH BARLOW 1950 [id number]               Secondary Coverage       Payor Plan Insurance Group Employer/Plan Group    [facility] OPTUM        Payor Plan Address Payor Plan Phone Number Payor Plan Fax Number Effective Dates    [address] [phone]  8/7/2023 - None Entered    [facility] [address]         Subscriber Name Subscriber Birth Date Member ID       DUTCH BARLOW 1950 [id number]                     Emergency Contacts        (Rel.) Home Phone Work Phone Mobile Phone    BRUNILDA BARLOW (Spouse) [phone]                 Physical Therapy Notes (most recent note)        Phylicia Bunch, PT at 08/15/23 1427  " Version 1 of 1         Goal Outcome Evaluation:  Plan of Care Reviewed With: patient        Progress: no change  Outcome Evaluation: Physical therapy treatment complete. The patient continues to require repeated cues to maintain sternal precautions. Patient improved tolerance to mobility. Patient increased ambulation distance compared to previous treatment session, though continues to demonstrate decreased weight shifitng to R side. This may be due to isues with prosthetic LE as patient stated that kept slipping in leg, his seal was not correct. The patient continues to present below baseline for functional mobility. The patient would continue to benefit from skilled PT to address strength, balance and activity tolerance deficits. Continue to current PT POC      Anticipated Discharge Disposition (PT): inpatient rehabilitation facility    Electronically signed by Phylicia Bunch PT at 08/15/23 1600

## 2023-08-16 NOTE — CASE MANAGEMENT/SOCIAL WORK
Continued Stay Note  [facility]     Patient Name: Dutch Arellano  MRN: [id number]  Today's Date: 8/16/2023    Admit Date: 8/9/2023    Plan: rehab   Discharge Plan       Row Name 08/16/23 0844       Plan    Plan rehab    Patient/Family in Agreement with Plan other (see comments)    Plan Comments I faxed  [facility] PT[facility]OT [facility]    Final Discharge Disposition Code 62 - inpatient rehab facility                   Discharge Codes    No documentation.                 Expected Discharge Date and Time       Expected Discharge Date Expected Discharge Time    Aug 17, 2023               Manuel Humphreys RN

## 2023-08-16 NOTE — CASE MANAGEMENT/SOCIAL WORK
Continued Stay Note  [facility]     Patient Name: Dutch Arellano  MRN: [id number]  Today's Date: 8/16/2023    Admit Date: 8/9/2023    Plan: rehab   Discharge Plan       Row Name 08/16/23 1217       Plan    Plan rehab    Patient/Family in Agreement with Plan yes    Plan Comments I was contacted by Belle from [facility], she will be visiting pt today, she received referral and updated notes. I updated pt and family in room.    Final Discharge Disposition Code 62 - inpatient rehab facility                   Discharge Codes    No documentation.                 Expected Discharge Date and Time       Expected Discharge Date Expected Discharge Time    Aug 17, 2023               Manuel Humphreys RN

## 2023-08-16 NOTE — DISCHARGE SUMMARY
[facility] Cardiothoracic Surgery  DISCHARGE SUMMARY    Patient Name: Dutch Arellano  : 1950  MRN: [id number]    Date of Admission: 2023 11:58 PM  Date of Discharge:  2023  Primary Care Physician: Provider, No Known  Referred By: Malia Shah, *    IP Care Team:  Patient Care Team:  Provider, No Known as PCP - General    Consults       Date and Time Order Name Status Description    2023 11:03 AM Inpatient Consult to Hospitalist JANIE for Medical Management      2023 11:22 AM Inpatient Consult to Cardiology Completed             Hospital Course     Presenting Problem: Coronary artery disease    Active Hospital Problems    Diagnosis  POA    T2DM [E11.9]  Yes    HTN (hypertension) [I10]  Yes    Former smoker [Z87.891]  Not Applicable    Vapes nicotine containing substance [Z72.0]  Yes    GERD [K21.9]  Yes    MV CAD [I25.10]  Yes      Resolved Hospital Problems   No resolved problems to display.        Procedures Performed:  Procedure(s):  MEDIAN STERNOTOMY, CORONARY ARTERY BYPASS GRAFTING X 4 UTILIZING LEFT INTERNAL MAMMARY ARTERY, ENDOSCOPIC VEIN HARVEST OF LEFT GREATER SAPHENOUS VEIN, LEFT ATRIAL APPENDAGE LIGATION, TRANSESOPHAGEAL ECHOCARDIOGRAM PER ANESTHESIA       Hospital Course:  Dutch Arellaon is a 73 y.o. male who was taken to the operating room 2023 for coronary artery bypass grafting x4 with EVH of the left greater saphenous vein and left atrial appendage ligation with 35 mm AtriCure clip with Dr. Louise. He was sent to ICU in stable condition and was extubated per ICU protocol. He was evaluated by PT/OT and was deemed appropriate for transfer to rehab facility on POD #6.  His hospital course is outlined below.    CAD s/p CABG x4/LAAL:  : coronado catheter, central line, chest tubes and pacing wires were removed  : transfer to telemetry orders were placed, he was transferred to telemetry floor same day  ASA, Statin, BB, Plavix     Postop  Afib:  Placed on amiodarone protocol  D/C with amiodarone 10 mg twice daily x7 days then 200 mg daily until follow-up with cardiology in [place]  No need for anticoagulation given brief run of A-fib and recent left atrial appendage closure-confirmed by ELVIA  Remains in NSR    Discharge Follow Up Recommendations for outpatient labs/diagnostics:  Follow-up with PCP in 1 to 2 weeks  Follow-up with CT surgery in 2 to 4 weeks  Follow-up with cardiology in 1 month    Day of Discharge     HPI:   Dutch Arellano is a 73 y.o. male with a history of former smoker (does vape), type 2 diabetes-A1c 7.1, liposarcoma s/p right AKA, HTN, CAD s/p 5 stents, and family history of heart disease in his father who  of MI at age 79.  Patient initially presented to [facility] on  for chest pain. He underwent cardiac catheterization which revealed multivessel coronary artery disease and was transferred to our facility on  for higher level of care and surgical revascularization.  He reports a 2-week history of chest pain and fatigue.  He denies any history of chest radiation, vein stripping or lasering procedures, or kidney disease.  He is currently chest pain-free.  On heparin and nitroglycerin drips.     Vital Signs:   Temp:  [98.9 øF (37.2 øC)-99.6 øF (37.6 øC)] 99.6 øF (37.6 øC)  Heart Rate:  [69-90] 90  Resp:  [16-19] 16  BP: (114-157)/(60-75) 136/71      Physical Exam:  General Appearance: alert, appears stated age and cooperative              Lungs: Diminished bases bilaterally              Heart: regular rhythm & normal rate, normal S1, S2, no murmur, no gallop, no rub, and no click              Skin: Incision c/d/I, staples present to EVH site              Sternum: Stable     Pertinent  and/or Most Recent Results     LAB RESULTS:          Lab 23  0240 23  0822 08/15/23  0502 23  0231 23  0310 23  0219 23  1517 23  1126 23  0309 08/10/23  1627   WBC 8.91 7.48  --   10.07 12.95* 11.41*   < > 10.20   < >  --    HEMOGLOBIN 8.5* 8.8* 8.6* 8.6* 9.4* 10.0*   < > 11.0*   < >  --    HEMATOCRIT 24.5* 25.9* 25.2* 26.2* 27.1* 28.8*   < > 31.4*   < >  --    PLATELETS 234 225  --  126* 155 160   < > 141   < >  --    NEUTROS ABS  --   --   --   --   --  9.75*  --   --   --   --    IMMATURE GRANS (ABS)  --   --   --   --   --  0.04  --   --   --   --    LYMPHS ABS  --   --   --   --   --  0.79  --   --   --   --    MONOS ABS  --   --   --   --   --  0.77  --   --   --   --    EOS ABS  --   --   --   --   --  0.03  --   --   --   --    MCV 93.9 94.5  --  96.3 92.8 92.0   < > 91.3   < >  --    PROTIME  --   --   --   --   --  14.8*  --  18.3*  --   --    APTT  --   --   --   --   --   --   --  31.6  --   --    HEPARIN ANTI-XA  --   --   --   --   --   --   --   --   --  0.22*    < > = values in this interval not displayed.         Lab 08/17/23  0240 08/16/23  1638 08/16/23  0822 08/15/23  0502 08/14/23  0932 08/14/23  0231 08/13/23  0920 08/13/23  0310 08/12/23  0219 08/11/23  1517 08/11/23  1203 08/11/23  1126   SODIUM 139  --  136 137  --  134*  --  136 139 142 140  --    POTASSIUM 3.8 4.0 3.8 4.2 3.9 3.8   < > 3.8 4.2 3.8  3.8 3.7  --    CHLORIDE 102  --  101 100  --  98  --  100 101 104 103  --    CO2 27.0  --  28.0 28.0  --  28.0  --  24.0 25.0 25.0 23.0  --    ANION GAP 10.0  --  7.0 9.0  --  8.0  --  12.0 13.0 13.0 14.0  --    BUN 15  --  16 22  --  22  --  17 14 14 12  --    CREATININE 0.93  --  0.73* 0.89  --  1.16  --  1.14 1.23 1.07 1.07  --    EGFR 87.2  --  96.7 91.1  --  66.9  --  68.3 62.4 73.7 73.7  --    GLUCOSE 93  --  115* 112*  --  141*  --  158* 183* 136* 142*  --    CALCIUM 8.7  --  8.8 8.7  --  9.2  --  8.8 9.1 9.3 9.6  --    IONIZED CALCIUM  --   --   --   --   --   --   --   --   --   --   --  1.38*   MAGNESIUM  --   --   --   --   --   --   --   --  2.0 2.3 2.4  --    PHOSPHORUS  --   --   --   --   --   --   --   --  5.2* 2.2* 2.3*  --     < > = values in this  interval not displayed.         Lab 08/12/23  0219 08/11/23  1517 08/11/23  1203   TOTAL PROTEIN 6.1  --   --    ALBUMIN 4.7 4.7 4.5   GLOBULIN 1.4  --   --    ALT (SGPT) 32  --   --    AST (SGOT) 66*  --   --    BILIRUBIN 0.7  --   --    ALK PHOS 39  --   --          Lab 08/12/23  0219 08/11/23  1126   PROTIME 14.8* 18.3*   INR 1.15* 1.50*                     Lab 08/11/23  1530 08/11/23  1132   PH, ARTERIAL 7.360 7.415   PCO2, ARTERIAL 42.6 39.4   PO2 ART 91.4 93.6   FIO2 40 100   HCO3 ART 24.0 25.2   BASE EXCESS ART -1.4* 0.6   CARBOXYHEMOGLOBIN 1.5 1.5     Brief Urine Lab Results  (Last result in the past 365 days)        Color   Clarity   Blood   Leuk Est   Nitrite   Protein   CREAT   Urine HCG        08/10/23 0147 Yellow   Clear   Negative   Negative   Negative   Negative                 Microbiology Results (last 10 days)       ** No results found for the last 240 hours. **            Adult Transthoracic Echocardiogram Complete    Result Date: 8/10/2023    Left ventricular systolic function is normal. Estimated left ventricular EF = 70%   The cardiac valves are anatomically and functionally normal.   Estimated right ventricular systolic pressure from tricuspid regurgitation is normal (<35 mmHg).     Spirometry    Result Date: 8/11/2023  Spirometry Interpretation: Normal Spirometry. (Normal FEV1 ratio and FVC).     XR Chest 1 View    Result Date: 8/17/2023  XR CHEST 1 VW Date of Exam: 8/17/2023 3:50 AM EDT Indication: postop Comparison: 8/16/2023. Findings: Sternotomy. Unchanged enlarged cardiac silhouette. Similar scattered subsegmental atelectasis in both lungs. No focal consolidation. No pleural effusion or pneumothorax.     Impression: No significant change to the bilateral subsegmental atelectasis without new disease in the chest. Electronically Signed: Bryan Silva MD  8/17/2023 9:59 AM EDT  Workstation ID: [id number]    XR Chest 1 View    Result Date: 8/16/2023  XR CHEST 1 VW Date of Exam: 8/16/2023 4:00  AM EDT Indication: postop Comparison: 8/15/2023. Findings: There is mild linear atelectasis of the left lower lobe, similar. Previously noted right lung atelectasis has improved and is minimal. There are somewhat low lung volumes. There are no definite effusions. There is stable borderline cardiomegaly.     Impression: Mild left basilar atelectasis. Improved right lung atelectasis. Electronically Signed: Mercy Toth MD  8/16/2023 8:16 AM EDT  Workstation ID: [id number]    XR Chest 1 View    Result Date: 8/15/2023  XR CHEST 1 VW Date of Exam: 8/15/2023 9:42 AM EDT Indication: postop Comparison: 8/13/2023 Findings: Removal of right IJ catheter, mediastinal drains, and chest tube. Sternotomy. Cardiomediastinal silhouette is unchanged. Similar scattered subsegmental atelectasis. No new focal consolidation. No discrete pneumothorax. No significant pleural fluid. Bones  are unchanged.     Impression: No discrete pneumothorax after removal of lines/tubes with similar scattered subsegmental atelectasis and no new disease in the chest. Electronically Signed: Bryan Silva MD  8/15/2023 10:00 AM EDT  Workstation ID: [id number]    XR Chest 1 View    Result Date: 8/13/2023  XR CHEST 1 VW Date of Exam: 8/13/2023 4:01 AM EDT Indication: Post-Op Heart Surgery Comparison: 8/12/2023 at 0453 hours Findings: Postoperative changes are noted. The left chest tube and mediastinal drains are stable. Atelectasis is noted within the lung bases. The right IJ venous sheath is in place. A central line extends to the SVC. There is no evidence for pneumothorax.     Impression: 1.Postoperative changes are noted. The left chest tube and mediastinal drains are stable in position. 2.Mild atelectasis within the lung bases. 3.Stable positioning of the right IJ venous sheath and central line. Electronically Signed: Pako Ojeda  8/13/2023 8:32 AM EDT  Workstation ID: [id number]    XR Chest 1 View    Result Date: 8/12/2023  XR CHEST 1 VW Date of Exam:  8/12/2023 3:53 AM EDT Indication: Post-Op Heart Surgery Comparison: 8/11/2023 Findings: Sternotomy. Extubation and removal of nasogastric tube. Unchanged position of right IJ catheter, mediastinal drains, and chest tubes. Cardiomediastinal silhouette is unchanged. Low lung volumes with bibasilar atelectasis, similar. Similar trace bilateral  pleural fluid. No measurable pneumothorax. Bones are unchanged.     Impression: Postsurgical chest with similar trace bilateral pleural effusions and bibasilar atelectasis after extubation. Electronically Signed: Bryan Silva MD  8/12/2023 8:10 AM EDT  Workstation ID: [id number]    XR Chest 1 View    Result Date: 8/11/2023  XR CHEST 1 VW Date of Exam: 8/11/2023 11:33 AM EDT Indication: Post-Op Check Line & Tube Placement Comparison: 8/10/2023. Findings: There are new sternal suture wires. There is a new left atrial appendage clip. ET tube is in place several centimeters above the lakeisha. NG tube is directed towards the left upper quadrant although tip is not well seen. There is a left chest tube overlying the left upper lobe. There are 2 mediastinal drains. There is no identifiable pneumothorax. There is mild cardiomegaly. There is mild atelectasis in the lung bases, greatest on the left. There is a right internal jugular sheath with the tip in the SVC.     Impression: Postoperative changes with various tubes and catheters in place as noted. Mild bibasilar atelectasis, greatest on the left. Electronically Signed: Mercy Toth MD  8/11/2023 12:03 PM EDT  Workstation ID: [id number]    XR Chest 1 View    Result Date: 8/10/2023  XR CHEST 1 VW Date of Exam: 8/10/2023 3:55 AM EDT Indication: preop preop Comparison: None available. Findings: The cardiomediastinal silhouette is within normal limits. Lungs are clear. No focal consolidation, pneumothorax, or significant pleural effusion. Osseous structures grossly intact. Advanced left glenohumeral osteoarthritis.     Impression: No acute  process. Electronically Signed: Shyam Dominguez  8/10/2023 7:42 AM EDT  Workstation ID: [id number]    Carotid Duplex - Bilateral    Result Date: 8/10/2023    Right internal carotid artery demonstrates a less than 50% stenosis.   Left internal carotid artery demonstrates a less than 50% stenosis.   Antegrade flow in the vertebral arteries bilaterally.     XR Lost Needle / Instrument    Result Date: 8/11/2023  XR LOST NEEDLE/INSTRUMENT Date of Exam: 8/11/2023 10:40 AM EDT Indication: Incorrect count Comparison: None available. Findings: By report there is a missing suture needle. Expected postsurgical changes of CABG with median sternotomy wires and surgical clips. Endotracheal tube terminates in the mid trachea. There is a right IJ introducer sheath in place. A left atrial appendage clip is noted. Midline and bilateral thoracostomy tubes are in place. Mildly low lung volumes with some accentuation of the cardiomediastinal silhouette and likely some streaky bibasilar atelectasis. No pleural effusion. No definite pneumothorax. No unexpected or unexplained metallic/radiopaque foreign bodies or medical devices.     Impression: Postsurgical changes of CABG. No unexplained or unexpected foreign body or medical device. Electronically Signed: Yuniel Machuca  8/11/2023 11:07 AM EDT  Workstation ID: [id number]    OUTSIDE INVASIVE CARDIOLOGY STUDY    Result Date: 8/9/2023  This procedure was auto-finalized with no dictation required.    Arterial Line    Result Date: 8/11/2023  Jerry Bolaños MD     8/11/2023  6:52 AM Arterial Line Patient reassessed immediately prior to procedure Patient location during procedure: pre-op Start time: 8/11/2023 6:35 AM Stop Time:8/11/2023 6:45 AM  Line placed for hemodynamic monitoring. Performed By Anesthesiologist: Jerry Bolaños MD Preanesthetic Checklist Completed: patient identified, IV checked, site marked, risks and benefits discussed, surgical consent, monitors and equipment  checked, pre-op evaluation and timeout performed Arterial Line Prep  Sterile Tech: cap, gloves and sterile barriers Prep: ChloraPrep Patient monitoring: blood pressure monitoring, continuous pulse oximetry and EKG Arterial Line Procedure Laterality:left Location:  radial artery Catheter size: 20 G Guidance: palpation technique Number of attempts: 1 Successful placement: yes Post Assessment Dressing Type: line sutured, occlusive dressing applied, secured with tape and wrist guard applied. Complications no Circ/Move/Sens Assessment: normal and unchanged. Patient Tolerance: patient tolerated the procedure well with no apparent complications     Central Line    Result Date: 8/11/2023  Jerry Bolaños MD     8/11/2023  7:51 AM Central Line Patient reassessed immediately prior to procedure Patient location during procedure: OR Start time: 8/11/2023 7:14 AM Stop Time:8/11/2023 7:22 AM Indications: vascular access Staff Anesthesiologist: Jerry Bolaños MD Preanesthetic Checklist Completed: patient identified, IV checked, site marked, risks and benefits discussed, surgical consent, monitors and equipment checked, pre-op evaluation and timeout performed Central Line Prep Sterile Tech:cap, gloves, gown, mask and sterile barriers Prep: chloraprep Patient monitoring: blood pressure monitoring, continuous pulse oximetry and EKG Central Line Procedure Laterality:right Location:internal jugular Catheter Type:MAC Catheter Size:9 Fr Guidance:ultrasound guided and landmark technique PROCEDURE NOTE/ULTRASOUND INTERPRETATION.  Using ultrasound guidance the potential vascular sites for insertion of the catheter were visualized to determine the patency of the vessel to be used for vascular access.  After selecting the appropriate site for insertion, the needle was visualized under ultrasound being inserted into the internal jugular vein, followed by ultrasound confirmation of wire and catheter placement. There were no  abnormalities seen on ultrasound; an image was taken; and the patient tolerated the procedure with no complications. Images: still images not obtained Assessment Post procedure:biopatch applied, line sutured, occlusive dressing applied and secured with tape Assessement:blood return through all ports, free fluid flow, chest x-ray ordered and Js Test Complications:no Patient Tolerance:patient tolerated the procedure well with no apparent complications     Intra-Op Anesthesia ELVIA    Result Date: 8/11/2023  Reggie Camilo MD     8/11/2023  5:46 PM Intra-Op Anesthesia ELVIA Procedure Performed: Intra-Op Anesthesia ELVIA      Start Time:      End Time:  Preanesthesia Checklist: Patient identified, IV assessed, risks and benefits discussed, monitors and equipment assessed, procedure being performed at surgeon's request and anesthesia consent obtained. General Procedure Information Diagnostic Indications for Echo:  assessment of ascending aorta, assessment of surgical repair, defect repair evaluation and hemodynamic monitoring Physician Requesting Echo: Spenser Louise MD Location performed:  OR Intubated Bite block not placed Heart visualized Probe Insertion:  Easy Probe Type:  Multiplane Modalities:  Pulse wave Doppler, continuous wave Doppler, color flow mapping and 2D only Echocardiographic and Doppler Measurements Ventricles Right Ventricle: Cavity size normal.  Global function normal.  Left Ventricle: Cavity size normal.  Global Function normal.  Ejection Fraction 65%.  Ventricular Regional Function: 1- Basal Anteroseptal:  normal 2- Basal Anterior:  normal 3- Basal Anterolateral:  normal 4- Basal Inferolateral:  normal 5- Basal Inferior:  normal 6- Basal Inferoseptal:  normal 7- Mid Anteroseptal:  normal 8- Mid Anterior:  normal 9- Mid Anterolateral:  normal 10- Mid Inferolateral:  normal 11- Mid Inferior:  normal 12- Mid Inferoseptal:  normal 13- Apical Anterior:  normal 14- Apical Lateral:  normal 15- Apical  Inferior:  normal 16- Apical Septal:  normal 17- [place]:  normal Valves Aortic Valve: Annulus normal.  Stenosis not present.  Regurgitation absent.  Leaflets normal.  Leaflet motions normal.  Mitral Valve: Annulus normal.  Stenosis not present.  Regurgitation trace.  Leaflets normal.  Leaflet motions normal.  Tricuspid Valve: Annulus normal.  Stenosis not present.  Regurgitation trace.  Leaflets normal.  Leaflet motions normal.  Other Valve Findings:      ANDREI 3.12 BY PLANIMETRY Aorta Ascending Aorta: Size normal.  Diameter 3.03 cm.  Dissection not present.  Plaque thickness less than 3 mm.  Mobile plaque not present.  Aortic Arch: Size normal.  Diameter 2.28 cm.  Dissection not present.  Plaque thickness less than 3 mm.  Mobile plaque not present.  Descending Aorta: Size normal.  Diameter 2.29 cm.  Dissection not present.  Plaque thickness less than 3 mm.  Mobile plaque not present.  Atria Left Atrium: Left atrial appendage normal. Other Atria Findings:      NO CLOT IN THE PO PO PWD 53.0 Septa Ventricular Septum: Intra-ventricular septum morphology normal.  Anesthesia Information Performed Personally Anesthesiologist:  Reggie Camilo MD      Results for orders placed during the hospital encounter of 08/09/23    Carotid Duplex - Bilateral    Interpretation Summary    Right internal carotid artery demonstrates a less than 50% stenosis.    Left internal carotid artery demonstrates a less than 50% stenosis.    Antegrade flow in the vertebral arteries bilaterally.      Results for orders placed during the hospital encounter of 08/09/23    Carotid Duplex - Bilateral    Interpretation Summary    Right internal carotid artery demonstrates a less than 50% stenosis.    Left internal carotid artery demonstrates a less than 50% stenosis.    Antegrade flow in the vertebral arteries bilaterally.      Results for orders placed during the hospital encounter of 08/09/23    Adult Transthoracic Echocardiogram  Complete    Interpretation Summary    Left ventricular systolic function is normal. Estimated left ventricular EF = 70%    The cardiac valves are anatomically and functionally normal.    Estimated right ventricular systolic pressure from tricuspid regurgitation is normal (<35 mmHg).          Discharge Details        Discharge Medications        New Medications        Instructions Start Date   amiodarone 200 MG tablet  Commonly known as: PACERONE   Take 1 tablet by mouth 2 (Two) Times a Day for 7 days, THEN 1 tablet Daily for 30 days.   Start Date: August 17, 2023     atorvastatin 40 MG tablet  Commonly known as: LIPITOR   40 mg, Oral, Nightly      clopidogrel 75 MG tablet  Commonly known as: PLAVIX   75 mg, Oral, Daily   Start Date: August 18, 2023     ferrous sulfate 325 (65 FE) MG tablet   325 mg, Oral, Daily With Breakfast   Start Date: August 18, 2023     HYDROcodone-acetaminophen 7.5-325 MG per tablet  Commonly known as: NORCO   1 tablet, Oral, Every 8 Hours PRN      insulin detemir 100 UNIT/ML injection  Commonly known as: LEVEMIR   15 Units, Subcutaneous, Nightly      nitroglycerin 0.4 MG SL tablet  Commonly known as: NITROSTAT   0.4 mg, Sublingual, Every 5 Minutes PRN, Take no more than 3 doses in 15 minutes.      pantoprazole 40 MG EC tablet  Commonly known as: PROTONIX  Replaces: omeprazole 20 MG capsule   40 mg, Oral, Every Early Morning   Start Date: August 18, 2023            Changes to Medications        Instructions Start Date   carvedilol 25 MG tablet  Commonly known as: COREG  What changed: how much to take   12.5 mg, Oral, 2 Times Daily With Meals             Continue These Medications        Instructions Start Date   allopurinol 100 MG tablet  Commonly known as: ZYLOPRIM   100 mg, Oral, Daily      aspirin 81 MG EC tablet   81 mg, Oral, Daily      fish oil 1000 MG capsule capsule   2,000 mg, Oral, 2 Times Daily With Meals      gabapentin 600 MG tablet  Commonly known as: NEURONTIN   600 mg, Oral, 2  Times Daily             Stop These Medications      isosorbide mononitrate 30 MG 24 hr tablet  Commonly known as: IMDUR     omeprazole 20 MG capsule  Commonly known as: priLOSEC  Replaced by: pantoprazole 40 MG EC tablet     triamterene-hydrochlorothiazide 37.5-25 MG per capsule  Commonly known as: DYAZIDE              No Known Allergies      Discharge Disposition:  Rehab Facility or Unit (DC - External)    Diet:  Hospital:  Diet Order   Procedures    Diet: Regular/House Diet, Cardiac Diets; Healthy Heart (2-3 Na+); Texture: Regular Texture (IDDSI 7); Fluid Consistency: Thin (IDDSI 0)       Activity:  Activity Instructions       Activity as Tolerated      Bathing Restrictions      You may shower    Type of Restriction: Bathing    Bathing Restrictions: No Tub Bath    Driving Restrictions      Type of Restriction: Driving    Driving Restrictions: No Driving While Taking Narcotics    Lifting Restrictions      Type of Restriction: Lifting    Lifting Restrictions: Lifting Restriction (Indicate Limit)    Weight Limit (Pounds): 10    Length of Lifting Restriction: until seen at next follow-up appointment            Restrictions or Other Recommendations:  No driving until seen by your cardiac surgeon at your follow up appointment. Do not drive while taking narcotics. Get up and get dressed each morning; do not stay in bed. Walking is the best form of exercise because it increases circulation throughout the body and to the heart muscle. Get plenty of sleep at night (8-10 hours). It is important for your breast bone (sternum) to heal properly after surgery.   Please follow these guidelines:  -No lifting, pulling, or pushing greater than 10 lbs for 12 weeks.   -Do not push or pull with your arms, especially when rising from a chair   -Have someone help you get up or roll to your side, and push off with your elbow to get out of bed.  -Avoid activities that cause you to strain or pull on your chest, such as driving a   "or tractor, raking, vacuuming, moving heavy items, shoveling, opening tight jars or swinging a golf club.  -If you have \"clicking\" in your sternum, avoid activities that cause clicking until the sternum heals.  -Canes or walkers may be used only for balance. Do not place your full weight on any of these devices until the chest is completely healed (usually 12 weeks).       CODE STATUS:    Code Status and Medical Interventions:   Ordered at: 08/10/23 0820     Code Status (Patient has no pulse and is not breathing):    CPR (Attempt to Resuscitate)     Medical Interventions (Patient has pulse or is breathing):    Full Support       Future Appointments   Date Time Provider Department Center   9/15/2023 10:45 AM Ilir Diaz MD [facility]       Additional Instructions for the Follow-ups that You Need to Schedule       Call MD With Problems / Concerns   As directed      Instructions:   Please call the CT Surgery office with any questions or concerns you may have [phone]    Order Comments: Instructions: Please call the CT Surgery office with any questions or concerns you may have [phone]         Discharge Follow-up with PCP   As directed       Currently Documented PCP:    Provider, No Known    PCP Phone Number:    None     Follow Up Details: Follow Up With PCP Within 7-14 Days        Discharge Follow-up with Specialty: Cardiothoracic Surgery   As directed      Follow Up Details: Follow Up in 2-4 Weeks   Specialty: Cardiothoracic Surgery        Discharge Follow-up with Specialty: Dr. Sahara Abernathy; 1 Month   As directed      Specialty: Dr. Sahara Abernathy   Follow Up: 1 Month        Discharge Follow-up with [facility]ty: [facility]; 1 Week   As directed      [facility]ty: [facility]   Follow Up: 1 Week   Follow Up Details: Follow Up With [facility] Within 7 Days of Discharge. If Discharged on a Weekend, Schedule Follow Up For The Following Friday at 0900.        Cardiac Rehab " Evaluation and Enrollment   Aug 22, 2023      Reason for Consult: Education                Discharge Education:  Post-Op Education:  Patient was advised on responsible use of opioids in the post-surgical setting.  Patient was advised these medications are highly addictive.  Patient advised on proper disposal of unused opioid medication and was urged to discard any unused opioid medication. I reviewed the patient's sternal precautions and outlined the physical activity suitable for each benchmark at the 6-week 3-month and 1 year intervals.  Wound Care:  Patient educated regarding care of post-operative wounds.  Patient is to wash with plain soap and water and pat dry.  Patient is not to use salves on the incision sites.  Patient instructed regarding the signs and symptoms of infection and when to call the office with any concerns.      Special Instructions:   1.  Keep incisions clean and dry at all times. Take a shower daily. Do not take a tub bath or use hot tubs until seen by the cardiac surgeon in your follow-up visit. Clean  your body and incisions daily with Dial soap and water. Always use a clean washcloth. Do not scrub your incision(s). Do not re-use dressings on your incision(s). Do not use any lotions, creams, oils, powders, antibiotic ointment (i.e., Neosporin), peroxide, alcohol, or iodine UNLESS told to do by the cardiac surgeon.  Patient may shower, but no tub baths until CT Surgery followup.   2.  No lifting over 10 lbs until CT Surgery follow up.  3.  No driving until released to do so by the surgeon.      Surgical Leg Precautions:   -Avoid sitting in one position or standing for long periods of time.  -Do not cross your legs.  -Keep your legs elevated while sitting  -Do not use any lotions, creams, oils, powders, antibiotic ointment (i.e. Neosporin), peroxide, alcohol, or iodine unless specifically prescribed by the cardiac surgeon.  -If elastic stockings (CAROLINE hose) were prescribed to you, wear the  stockings while you are up for at least two weeks after discharge. The stockings help decrease swelling. However, remove stockings at bedtime. Wash the stockings with mild soap and water, and make sure they are completely dry before you put them back on.    When to Call the Cardiac Surgeon:  -Increased swelling, redness, tenderness, and drainage  -Increased warmth in the skin around an incision  -Large amount of clear or pinkish drainage  -Sudden increased amount of drainage  -White, yellow, or greenish drainage  -Odor, which may be foul or sweet, coming from an incision  -An opening in your incisions  -Temperature higher than 101 degrees Fahrenheit   -Temperature higher than 100 degrees Fahrenheit two times within 24 hours  -Do not hesitate to call the cardiac surgery nurse navigator or cardiac surgeon if you have concerns or questions.     *The [facility] cardiac surgery nurse navigator is available Monday-Friday 8 a.m. to 4:30 p.m. [phone]  Call 911:  -Chest pain (pain similar to what you experienced prior to surgery)  -Fainting spells  -Bright red stool  -Vomiting bright red blood  -Shortness of breath not relieved by rest  -Sudden numbness or weakness in arms or legs  -Sudden, severe headache  -Heart rate faster than 150 beats/minute with shortness of breath or a new irregular heart rate    JANIE Lopez  08/17/23  13:02 EDT    Time: I spent 35 minutes on this discharge activity which included: face-to-face encounter with the patient, reviewing the data in the system, coordination of the care with the nursing staff as well as consultants, documentation, and entering orders.

## 2023-08-16 NOTE — PROGRESS NOTES
[facility]  PROGRESS NOTE    Dutch Arellano   [id number]   1950    LOS: 6 days .  Date of Admission: 8/9/2023  Date of Service: 08/16/23    Primary Care Physician: Provider, No Known    Chief Complaint: f/u HTN, HLD, brief post op Afib      Problem List:   MV CAD    T2DM    HTN (hypertension)    Former smoker    Vapes nicotine containing substance    GERD    Subjective      Sitting on side of the bed, no complaints, just eager to return home    ROS  All systems have been reviewed and are negative with the exception of those mentioned in the HPI and problem list above.     Objective     Vital Sign Min/Max for last 24 hours  Temp  Min: 97.8 øF (36.6 øC)  Max: 98.3 øF (36.8 øC)   BP  Min: 116/64  Max: 135/73   Pulse  Min: 68  Max: 74   Resp  Min: 15  Max: 18   SpO2  Min: 95 %  Max: 97 %   No data recorded   Weight  Min: 91.4 kg (201 lb 8 oz)  Max: 91.4 kg (201 lb 8 oz)     Physical Exam:  GENERAL: Alert, cooperative, in no acute distress.   HEENT: Normocephalic, no jugular venous distention  HEART: Regular rhythm, normal rate, and no murmurs, gallops, or rubs.   LUNGS: Clear to auscultation bilaterally. No wheezing, rales or rhonchi. On 2 L NC  NEUROLOGIC: No focal abnormalities involving strength or sensation are noted.   EXTREMITIES: No clubbing, cyanosis, or edema noted. Right BKA.    Results:  Results from last 7 days   Lab Units 08/15/23  0502 08/14/23  0231 08/13/23  0310 08/12/23  0219   WBC 10*3/mm3  --  10.07 12.95* 11.41*   HEMOGLOBIN g/dL 8.6* 8.6* 9.4* 10.0*   HEMATOCRIT % 25.2* 26.2* 27.1* 28.8*   PLATELETS 10*3/mm3  --  126* 155 160     Results from last 7 days   Lab Units 08/15/23  0502 08/14/23  0932 08/14/23  0231 08/13/23  0920 08/13/23  0310   SODIUM mmol/L 137  --  134*  --  136   POTASSIUM mmol/L 4.2 3.9 3.8   < > 3.8   CHLORIDE mmol/L 100  --  98  --  100   CO2 mmol/L 28.0  --  28.0  --  24.0   BUN mg/dL 22  --  22  --  17   CREATININE mg/dL 0.89  --   1.16  --  1.14   GLUCOSE mg/dL 112*  --  141*  --  158*    < > = values in this interval not displayed.      Lab Results   Component Value Date    CHOL 192 08/10/2023    TRIG 474 (H) 08/10/2023    HDL 29 (L) 08/10/2023    LDL 86 08/10/2023    AST 66 (H) 08/12/2023    ALT 32 08/12/2023     Results from last 7 days   Lab Units 08/10/23  0104   HEMOGLOBIN A1C % 7.10*     Results from last 7 days   Lab Units 08/10/23  0104   CHOLESTEROL mg/dL 192   TRIGLYCERIDES mg/dL 474*   HDL CHOL mg/dL 29*   LDL CHOL mg/dL 86           Results from last 7 days   Lab Units 08/12/23  0219 08/11/23  1126 08/10/23  0104   PROTIME Seconds 14.8* 18.3* 13.9   INR  1.15* 1.50* 1.06   APTT seconds  --  31.6 44.2*           Intake/Output Summary (Last 24 hours) at 8/16/2023 0847  Last data filed at 8/15/2023 1300  Gross per 24 hour   Intake 720 ml   Output --   Net 720 ml     EKG/TELE: I have personally reviewed the telemetry    Radiology Data:   XR Chest 1 View    Result Date: 8/16/2023  Impression: Mild left basilar atelectasis. Improved right lung atelectasis. Electronically Signed: Mercy Toth MD  8/16/2023 8:16 AM EDT  Workstation ID: [id number]    XR Chest 1 View    Result Date: 8/15/2023  Impression: No discrete pneumothorax after removal of lines/tubes with similar scattered subsegmental atelectasis and no new disease in the chest. Electronically Signed: Bryan Silva MD  8/15/2023 10:00 AM EDT  Workstation ID: [id number]    Results for orders placed during the hospital encounter of 08/09/23    Adult Transthoracic Echocardiogram Complete    Interpretation Summary    Left ventricular systolic function is normal. Estimated left ventricular EF = 70%    The cardiac valves are anatomically and functionally normal.    Estimated right ventricular systolic pressure from tricuspid regurgitation is normal (<35 mmHg).       Current Medications:  acetaminophen, 650 mg, Oral, Q8H  amiodarone, 200 mg, Oral, Q8H   Followed by  [START ON 8/20/2023]  amiodarone, 200 mg, Oral, Q12H   Followed by  [START ON 9/3/2023] amiodarone, 200 mg, Oral, Daily  aspirin, 81 mg, Oral, Daily  atorvastatin, 40 mg, Oral, Nightly  carvedilol, 12.5 mg, Oral, BID With Meals  clopidogrel, 75 mg, Oral, Daily  gabapentin, 300 mg, Oral, Q12H  insulin detemir, 15 Units, Subcutaneous, Nightly  insulin lispro, 3-14 Units, Subcutaneous, 4x Daily AC & at Bedtime  pantoprazole, 40 mg, Oral, Q AM  pharmacy consult - MTM, , Does not apply, Daily  senna-docusate sodium, 2 tablet, Oral, BID  sodium chloride, 10 mL, Intravenous, Q12H       Assessment and Plan:   1. MVCAD  - normal EF pre-op  - CABG x4 with Dr. Louise 8/11 (LIMA-LAD, SVG to diagonal, OM and RCA)  - PO clip with #35 Atricure clip  - continue ASA, statin      2. Hypertension   - on BB, overall currently well controlled  - consider ACE/ARB if needed as next agent      3. Hyperlipidemia   - LDL 86, Trig 474  - on Lipitor 40mg    4. DM2  - A1C 7.1%  - per intensivists     5. PAF  - amiodarone protocol initiated  - EKG today reviewed, acceptable QTc, remains in NSR  - defer anticoagulation at this point given brief atrial fibrillation and recent left atrial appendage closure.  Home with amiodarone 200 mg twice daily for 7 days then 200 mg daily until follow-up with cardiology in [place]    SNF placement per case management.  Stable for discharge to rehab when bed available    Electronically signed by JANIE Nix, 08/15/23, 8:42 AM EDT.     I have seen and examined the patient, performing a face-to-face diagnostic evaluation with plan of care reviewed and developed with the Advanced Practice Clinician and nursing staff. I have addended and modified the above history of present illness, physical examination, and assessment and plan to reflect my findings and impressions. > 50% of the combined encounter time was performed by Dr. Huggins.    Spenser Huggins MD, [facility], [facility]  08/16/23

## 2023-08-17 ENCOUNTER — APPOINTMENT (OUTPATIENT)
Dept: GENERAL RADIOLOGY | Facility: HOSPITAL | Age: 73
DRG: 236 | End: 2023-08-17
Payer: OTHER GOVERNMENT

## 2023-08-17 ENCOUNTER — DOCUMENTATION (OUTPATIENT)
Dept: CARDIAC REHAB | Facility: HOSPITAL | Age: 73
End: 2023-08-17
Payer: MEDICARE

## 2023-08-17 VITALS
SYSTOLIC BLOOD PRESSURE: 136 MMHG | TEMPERATURE: 99.6 F | RESPIRATION RATE: 16 BRPM | BODY MASS INDEX: 27.33 KG/M2 | DIASTOLIC BLOOD PRESSURE: 71 MMHG | WEIGHT: 195.2 LBS | HEART RATE: 90 BPM | OXYGEN SATURATION: 94 % | HEIGHT: 71 IN

## 2023-08-17 LAB
ANION GAP SERPL CALCULATED.3IONS-SCNC: 10 MMOL/L (ref 5–15)
BUN SERPL-MCNC: 15 MG/DL (ref 8–23)
BUN/CREAT SERPL: 16.1 (ref 7–25)
CALCIUM SPEC-SCNC: 8.7 MG/DL (ref 8.6–10.5)
CHLORIDE SERPL-SCNC: 102 MMOL/L (ref 98–107)
CO2 SERPL-SCNC: 27 MMOL/L (ref 22–29)
CREAT SERPL-MCNC: 0.93 MG/DL (ref 0.76–1.27)
DEPRECATED RDW RBC AUTO: 45 FL (ref 37–54)
EGFRCR SERPLBLD CKD-EPI 2021: 87.2 ML/MIN/1.73
ERYTHROCYTE [DISTWIDTH] IN BLOOD BY AUTOMATED COUNT: 13.5 % (ref 12.3–15.4)
GLUCOSE BLDC GLUCOMTR-MCNC: 103 MG/DL (ref 70–130)
GLUCOSE BLDC GLUCOMTR-MCNC: 131 MG/DL (ref 70–130)
GLUCOSE SERPL-MCNC: 93 MG/DL (ref 65–99)
HCT VFR BLD AUTO: 24.5 % (ref 37.5–51)
HGB BLD-MCNC: 8.5 G/DL (ref 13–17.7)
MCH RBC QN AUTO: 32.6 PG (ref 26.6–33)
MCHC RBC AUTO-ENTMCNC: 34.7 G/DL (ref 31.5–35.7)
MCV RBC AUTO: 93.9 FL (ref 79–97)
PLATELET # BLD AUTO: 234 10*3/MM3 (ref 140–450)
PMV BLD AUTO: 9.6 FL (ref 6–12)
POTASSIUM SERPL-SCNC: 3.8 MMOL/L (ref 3.5–5.2)
RBC # BLD AUTO: 2.61 10*6/MM3 (ref 4.14–5.8)
SODIUM SERPL-SCNC: 139 MMOL/L (ref 136–145)
WBC NRBC COR # BLD: 8.91 10*3/MM3 (ref 3.4–10.8)

## 2023-08-17 PROCEDURE — 85027 COMPLETE CBC AUTOMATED: CPT

## 2023-08-17 PROCEDURE — 0 POTASSIUM CHLORIDE 10 MEQ/100ML SOLUTION: Performed by: THORACIC SURGERY (CARDIOTHORACIC VASCULAR SURGERY)

## 2023-08-17 PROCEDURE — 97116 GAIT TRAINING THERAPY: CPT

## 2023-08-17 PROCEDURE — 80048 BASIC METABOLIC PNL TOTAL CA: CPT

## 2023-08-17 PROCEDURE — 99232 SBSQ HOSP IP/OBS MODERATE 35: CPT | Performed by: INTERNAL MEDICINE

## 2023-08-17 PROCEDURE — 99024 POSTOP FOLLOW-UP VISIT: CPT | Performed by: THORACIC SURGERY (CARDIOTHORACIC VASCULAR SURGERY)

## 2023-08-17 PROCEDURE — 25010000002 HYALURONIDASE (HUMAN) 150 UNIT/ML SOLUTION 1 ML VIAL

## 2023-08-17 PROCEDURE — 82948 REAGENT STRIP/BLOOD GLUCOSE: CPT

## 2023-08-17 PROCEDURE — 94761 N-INVAS EAR/PLS OXIMETRY MLT: CPT

## 2023-08-17 PROCEDURE — 99024 POSTOP FOLLOW-UP VISIT: CPT

## 2023-08-17 PROCEDURE — 71045 X-RAY EXAM CHEST 1 VIEW: CPT

## 2023-08-17 RX ORDER — HYDROCODONE BITARTRATE AND ACETAMINOPHEN 7.5; 325 MG/1; MG/1
1 TABLET ORAL EVERY 8 HOURS PRN
Qty: 15 TABLET | Refills: 0 | Status: SHIPPED | OUTPATIENT
Start: 2023-08-17

## 2023-08-17 RX ORDER — PANTOPRAZOLE SODIUM 40 MG/1
40 TABLET, DELAYED RELEASE ORAL
Qty: 30 TABLET | Refills: 3 | Status: SHIPPED | OUTPATIENT
Start: 2023-08-18

## 2023-08-17 RX ORDER — FERROUS SULFATE 325(65) MG
325 TABLET ORAL
Start: 2023-08-18

## 2023-08-17 RX ORDER — AMIODARONE HYDROCHLORIDE 200 MG/1
TABLET ORAL
Qty: 44 TABLET | Refills: 0 | Status: SHIPPED | OUTPATIENT
Start: 2023-08-17 | End: 2023-09-22

## 2023-08-17 RX ORDER — CARVEDILOL 25 MG/1
12.5 TABLET ORAL 2 TIMES DAILY WITH MEALS
Qty: 30 TABLET | Refills: 5 | Status: SHIPPED | OUTPATIENT
Start: 2023-08-17

## 2023-08-17 RX ORDER — NITROGLYCERIN 0.4 MG/1
0.4 TABLET SUBLINGUAL
Qty: 25 TABLET | Refills: 12 | Status: SHIPPED | OUTPATIENT
Start: 2023-08-17

## 2023-08-17 RX ORDER — POTASSIUM CHLORIDE 7.45 MG/ML
10 INJECTION INTRAVENOUS
Status: DISPENSED | OUTPATIENT
Start: 2023-08-17 | End: 2023-08-17

## 2023-08-17 RX ORDER — CLOPIDOGREL BISULFATE 75 MG/1
75 TABLET ORAL DAILY
Qty: 30 TABLET | Refills: 5 | Status: SHIPPED | OUTPATIENT
Start: 2023-08-18

## 2023-08-17 RX ORDER — ATORVASTATIN CALCIUM 40 MG/1
40 TABLET, FILM COATED ORAL NIGHTLY
Qty: 90 TABLET | Refills: 3 | Status: SHIPPED | OUTPATIENT
Start: 2023-08-17

## 2023-08-17 RX ADMIN — POTASSIUM CHLORIDE 10 MEQ: 7.46 INJECTION, SOLUTION INTRAVENOUS at 05:22

## 2023-08-17 RX ADMIN — GABAPENTIN 300 MG: 300 CAPSULE ORAL at 09:22

## 2023-08-17 RX ADMIN — FERROUS SULFATE TAB 325 MG (65 MG ELEMENTAL FE) 325 MG: 325 (65 FE) TAB at 09:22

## 2023-08-17 RX ADMIN — SODIUM CHLORIDE 150 UNITS: 9 INJECTION INTRAMUSCULAR; INTRAVENOUS; SUBCUTANEOUS at 09:22

## 2023-08-17 RX ADMIN — ASPIRIN 81 MG: 81 TABLET, COATED ORAL at 09:22

## 2023-08-17 RX ADMIN — CLOPIDOGREL BISULFATE 75 MG: 75 TABLET ORAL at 09:22

## 2023-08-17 RX ADMIN — SENNOSIDES AND DOCUSATE SODIUM 2 TABLET: 50; 8.6 TABLET ORAL at 09:22

## 2023-08-17 RX ADMIN — PANTOPRAZOLE SODIUM 40 MG: 40 TABLET, DELAYED RELEASE ORAL at 06:44

## 2023-08-17 RX ADMIN — AMIODARONE HYDROCHLORIDE 200 MG: 200 TABLET ORAL at 03:12

## 2023-08-17 RX ADMIN — ACETAMINOPHEN 650 MG: 325 TABLET ORAL at 06:44

## 2023-08-17 RX ADMIN — CARVEDILOL 12.5 MG: 12.5 TABLET, FILM COATED ORAL at 09:22

## 2023-08-17 NOTE — CASE MANAGEMENT/SOCIAL WORK
Continued Stay Note  [facility]     Patient Name: Dutch Arellano  MRN: [id number]  Today's Date: 8/17/2023    Admit Date: 8/9/2023    Plan: rehab [facility] acute   Discharge Plan       Row Name 08/17/23 1208       Plan    Plan rehab [facility] acute    Patient/Family in Agreement with Plan yes    Plan Comments I was contacted by Belle from [facility] that pt has bed offer for today. I chat messaged treatment team. Pt  is agreeable to d/c today to acute rehab. Family unable to transport due to small car. I arranged 1430pm wheelchair transport thru [facility], they will come to room to  patient.    Final Discharge Disposition Code 62 - inpatient rehab facility                   Discharge Codes    No documentation.                 Expected Discharge Date and Time       Expected Discharge Date Expected Discharge Time    Aug 18, 2023               Manuel Humphreys RN

## 2023-08-17 NOTE — THERAPY TREATMENT NOTE
Patient Name: Dutch Arellano  : 1950    MRN: [id number]                              Today's Date: 2023       Admit Date: 2023    Visit Dx:     ICD-10-CM ICD-9-CM   1. Coronary artery disease of native artery of native heart with stable angina pectoris  I25.118 414.01     413.9     Patient Active Problem List   Diagnosis    Coronary artery disease involving native coronary artery of native heart    MV CAD    T2DM    HTN (hypertension)    Former smoker    Vapes nicotine containing substance    GERD     Past Medical History:   Diagnosis Date    Coronary artery disease     Diabetes mellitus     Former smoker 2023    HTN (hypertension) 2023    Hypertension     Vapes nicotine containing substance 2023     Past Surgical History:   Procedure Laterality Date    CORONARY ARTERY BYPASS GRAFT N/A 2023    Procedure: MEDIAN STERNOTOMY, CORONARY ARTERY BYPASS GRAFTING X 4 UTILIZING LEFT INTERNAL MAMMARY ARTERY, ENDOSCOPIC VEIN HARVEST OF LEFT GREATER SAPHENOUS VEIN, LEFT ATRIAL APPENDAGE LIGATION, TRANSESOPHAGEAL ECHOCARDIOGRAM PER ANESTHESIA;  Surgeon: Spenser Louise MD;  Location: [facility];  Service: Cardiothoracic;  Laterality: N/A;    LEFT HEART CATH        General Information       Row Name 23          Physical Therapy Time and Intention    Document Type therapy note (daily note)  -KW     Mode of Treatment physical therapy  -KW       Row Name 23 143          General Information    Patient Profile Reviewed yes  -KW     Existing Precautions/Restrictions cardiac;fall;oxygen therapy device and L/min;sternal;other (see comments)  Remote R AKA  -KW       Row Name 23 143          Cognition    Orientation Status (Cognition) oriented x 3  -KW       Row Name 23 143          Safety Issues, Functional Mobility    Impairments Affecting Function (Mobility) balance;endurance/activity tolerance;pain;postural/trunk control;strength  -KW               User Key  (r)  = Recorded By, (t) = Taken By, (c) = Cosigned By      Initials Name Provider Type    KW Phylicia Bunch PT Physical Therapist                   Mobility       Row Name 08/17/23 1420          Bed Mobility    Supine-Sit [place] (Bed Mobility) supervision  -KW     Assistive Device (Bed Mobility) bed rails;head of bed elevated  -KW       Row Name 08/17/23 1420          Sit-Stand Transfer    Sit-Stand [place] (Transfers) verbal cues;supervision  -KW     Assistive Device (Sit-Stand Transfers) walker, front-wheeled  -KW       Row Name 08/17/23 1420          Gait/Stairs (Locomotion)    [place]hee Level (Gait) supervision  -KW     Distance in Feet (Gait) 200  -KW     Deviations/Abnormal Patterns (Gait) base of support, wide  -KW               User Key  (r) = Recorded By, (t) = Taken By, (c) = Cosigned By      Initials Name Provider Type    Phylicia Welch PT Physical Therapist                   Obj/Interventions    No documentation.                  Goals/Plan    No documentation.                  Clinical Impression       Row Name 08/17/23 1430          Pain    Pretreatment Pain Rating 0/10 - no pain  -KW       Row Name 08/17/23 1430          Plan of Care Review    Plan of Care Reviewed With patient  -KW     Progress improving  -KW     Outcome Evaluation Physical therapy treatment complete. The patient continues to require additional time to complete mobility. Patient improved tolerance to mobility. Patient increased ambulation distance compared to previous treatment session. The patient continues to present below baseline for functional mobility. The patient would continue to benefit from skilled PT to address strength, balance and activity tolerance deficits. Continue to current PT POC  -KW       Row Name 08/17/23 1430          Positioning and Restraints    Pre-Treatment Position in bed  -KW     Post Treatment Position bed  -KW     In Bed supine;call light within reach;encouraged to call for  assist;with family/caregiver  -               User Key  (r) = Recorded By, (t) = Taken By, (c) = Cosigned By      Initials Name Provider Type    Phylicia Welch PT Physical Therapist                   Outcome Measures       Row Name 08/17/23 1430 08/17/23 0800       How much help from another person do you currently need...    Turning from your back to your side while in flat bed without using bedrails? 4  -KW 4  -MO    Moving from lying on back to sitting on the side of a flat bed without bedrails? 4  -KW 4  -MO    Moving to and from a bed to a chair (including a wheelchair)? 4  -KW 4  -MO    Standing up from a chair using your arms (e.g., wheelchair, bedside chair)? 4  -KW 4  -MO    Climbing 3-5 steps with a railing? 3  -KW 3  -MO    To walk in hospital room? 3  -KW 4  -MO    AM-PAC 6 Clicks Score (PT) 22  -KW 23  -MO    Highest level of mobility 7 --> Walked 25 feet or more  -KW 7 --> Walked 25 feet or more  -MO      Row Name 08/17/23 1430          Functional Assessment    Outcome Measure Options AM-PAC 6 Clicks Basic Mobility (PT)  -               User Key  (r) = Recorded By, (t) = Taken By, (c) = Cosigned By      Initials Name Provider Type    Gali Mijares, RN Registered Nurse    Phylicia Welch, NANDO Physical Therapist                                 Physical Therapy Education       Title: PT OT SLP Therapies (Resolved)       Topic: Physical Therapy (Resolved)       Point: Mobility training (Resolved)       Learning Progress Summary             Patient Acceptance, E,TB, NR by KW at 8/15/2023 1427    Comment: continud edcuation about sternal precuations    Acceptance, E, NR by KG at 8/14/2023 0848    Acceptance, E, NR by KG at 8/12/2023 0936                         Point: Home exercise program (Resolved)       Learning Progress Summary             Patient Acceptance, E,TB, NR by KW at 8/15/2023 1427    Comment: continud edcuation about sternal precuations    Acceptance, E, NR by KG at  8/14/2023 0848                         Point: Body mechanics (Resolved)       Learning Progress Summary             Patient Acceptance, E,TB, NR by KW at 8/15/2023 1427    Comment: continud edcuation about sternal precuations    Acceptance, E, NR by KG at 8/14/2023 0848    Acceptance, E, NR by KG at 8/12/2023 0936                         Point: Precautions (Resolved)       Learning Progress Summary             Patient Acceptance, E,TB, NR by KW at 8/15/2023 1427    Comment: continud edcuation about sternal precuations    Acceptance, E, NR by KG at 8/14/2023 0848    Acceptance, E, NR by KG at 8/12/2023 0936                                         User Key       Initials Effective Dates Name Provider Type Discipline    KG 05/22/20 -  Chelsea Contreras, PT Physical Therapist PT    KW 01/27/22 -  Phylicia Bunch PT Physical Therapist PT                  PT Recommendation and Plan     Plan of Care Reviewed With: patient  Progress: improving  Outcome Evaluation: Physical therapy treatment complete. The patient continues to require additional time to complete mobility. Patient improved tolerance to mobility. Patient increased ambulation distance compared to previous treatment session. The patient continues to present below baseline for functional mobility. The patient would continue to benefit from skilled PT to address strength, balance and activity tolerance deficits. Continue to current PT POC     Time Calculation:         PT Charges       Row Name 08/17/23 1430             Time Calculation    Start Time 1430  -KW      PT Received On 08/17/23  -KW      PT Goal Re-Cert Due Date 08/22/23  -KW         Timed Charges    25894 - Gait Training Minutes  16  -KW         Total Minutes    Timed Charges Total Minutes 16  -KW       Total Minutes 16  -KW                User Key  (r) = Recorded By, (t) = Taken By, (c) = Cosigned By      Initials Name Provider Type    KW Phylicia Bunch, PT Physical Therapist                   Therapy Charges for Today       Code Description Service Date Service Provider Modifiers Qty    [id number] HC GAIT TRAINING EA 15 MIN 8/17/2023 Phylicia Bunch, PT GP 1            PT G-Codes  Outcome Measure Options: AM-PAC 6 Clicks Basic Mobility (PT)  AM-PAC 6 Clicks Score (PT): 22  AM-PAC 6 Clicks Score (OT): 17  PT Discharge Summary  Anticipated Discharge Disposition (PT): inpatient rehabilitation facility    Phylicia Bunch, NANDO  8/17/2023

## 2023-08-17 NOTE — PROGRESS NOTES
Cardiac Rehab staff mailed referral letter to patient regarding Phase II Cardiac Rehab program. Instruction for patient to contact [facility] Cardiac Rehab Department for additional program information and to forward referral to closest Cardiac Rehab program.

## 2023-08-17 NOTE — DISCHARGE PLACEMENT REQUEST
"Dutch Barlow (73 y.o. Male) From Manuel Humphreys RN  [id number]      Date of Birth   1950    Social Security Number       Address   [address] [facility] [address]    Home Phone   [phone]    MRN   [id number]       [facility]    Marital Status                               Admission Date   8/9/23    Admission Type   Elective    Admitting Provider   Spenser Louise MD    Attending Provider   Spenser Louise MD    Department, Room/Bed   [address], S486/1       Discharge Date       Discharge Disposition   Rehab Facility or Unit (DC - External)    Discharge Destination                                 Attending Provider: Spenser Louise MD    Allergies: No Known Allergies    Isolation: None   Infection: None   Code Status: CPR    Ht: 180.3 cm (71\")   Wt: 88.5 kg (195 lb 3.2 oz)    Admission Cmt: None   Principal Problem: None                  Active Insurance as of 8/9/2023       Primary Coverage       Payor Plan Insurance Group Employer/Plan Group    MEDICARE MEDICARE A & B        Payor Plan Address Payor Plan Phone Number Payor Plan Fax Number Effective Dates    [address] [phone]  11/1/2012 - None Entered    [facility] [id number]         Subscriber Name Subscriber Birth Date Member ID       DUTCH BARLOW 1950 [id number]               Secondary Coverage       Payor Plan Insurance Group Employer/Plan Group    [facility] OPTUM        Payor Plan Address Payor Plan Phone Number Payor Plan Fax Number Effective Dates    [address] [phone]  8/7/2023 - None Entered    [facility] [id number]         Subscriber Name Subscriber Birth Date Member ID       DUTCH BARLOW 1950 [id number]                     Emergency Contacts        (Rel.) Home Phone Work Phone Mobile Phone    YENBRUNLIDA (Spouse) [phone]                   Discharge Summary        Joyce Paz APRN " at 23 1302              [facility] Cardiothoracic Surgery  DISCHARGE SUMMARY    Patient Name: Dutch Arellano  : 1950  MRN: [id number]    Date of Admission: 2023 11:58 PM  Date of Discharge:  2023  Primary Care Physician: Provider, No Known  Referred By: Malia Shah, *    IP Care Team:  Patient Care Team:  Provider, No Known as PCP - General    Consults       Date and Time Order Name Status Description    2023 11:03 AM Inpatient Consult to Hospitalist APRN for Medical Management      2023 11:22 AM Inpatient Consult to Cardiology Completed             Hospital Course     Presenting Problem: Coronary artery disease    Active Hospital Problems    Diagnosis  POA    T2DM [E11.9]  Yes    HTN (hypertension) [I10]  Yes    Former smoker [Z87.891]  Not Applicable    Vapes nicotine containing substance [Z72.0]  Yes    GERD [K21.9]  Yes    MV CAD [I25.10]  Yes      Resolved Hospital Problems   No resolved problems to display.        Procedures Performed:  Procedure(s):  MEDIAN STERNOTOMY, CORONARY ARTERY BYPASS GRAFTING X 4 UTILIZING LEFT INTERNAL MAMMARY ARTERY, ENDOSCOPIC VEIN HARVEST OF LEFT GREATER SAPHENOUS VEIN, LEFT ATRIAL APPENDAGE LIGATION, TRANSESOPHAGEAL ECHOCARDIOGRAM PER ANESTHESIA       Hospital Course:  Dutch Arellano is a 73 y.o. male who was taken to the operating room 2023 for coronary artery bypass grafting x4 with EVH of the left greater saphenous vein and left atrial appendage ligation with 35 mm AtriCure clip with Dr. Louise. He was sent to ICU in stable condition and was extubated per ICU protocol. He was evaluated by PT/OT and was deemed appropriate for transfer to rehab facility on POD #6.  His hospital course is outlined below.    CAD s/p CABG x4/LAAL:  : coronado catheter, central line, chest tubes and pacing wires were removed  : transfer to telemetry orders were placed, he was transferred to telemetry floor same day  ASA, Statin,  BB, Plavix     Postop Afib:  Placed on amiodarone protocol  D/C with amiodarone 10 mg twice daily x7 days then 200 mg daily until follow-up with cardiology in [place]  No need for anticoagulation given brief run of A-fib and recent left atrial appendage closure-confirmed by ELVIA  Remains in NSR    Discharge Follow Up Recommendations for outpatient labs/diagnostics:  Follow-up with PCP in 1 to 2 weeks  Follow-up with CT surgery in 2 to 4 weeks  Follow-up with cardiology in 1 month    Day of Discharge     HPI:   Dutch Arellano is a 73 y.o. male with a history of former smoker (does vape), type 2 diabetes-A1c 7.1, liposarcoma s/p right AKA, HTN, CAD s/p 5 stents, and family history of heart disease in his father who  of MI at age 79.  Patient initially presented to [facility] on  for chest pain. He underwent cardiac catheterization which revealed multivessel coronary artery disease and was transferred to our facility on  for higher level of care and surgical revascularization.  He reports a 2-week history of chest pain and fatigue.  He denies any history of chest radiation, vein stripping or lasering procedures, or kidney disease.  He is currently chest pain-free.  On heparin and nitroglycerin drips.     Vital Signs:   Temp:  [98.9 øF (37.2 øC)-99.6 øF (37.6 øC)] 99.6 øF (37.6 øC)  Heart Rate:  [69-90] 90  Resp:  [16-19] 16  BP: (114-157)/(60-75) 136/71      Physical Exam:  General Appearance: alert, appears stated age and cooperative              Lungs: Diminished bases bilaterally              Heart: regular rhythm & normal rate, normal S1, S2, no murmur, no gallop, no rub, and no click              Skin: Incision c/d/I, staples present to [facility] site              Sternum: Stable     Pertinent  and/or Most Recent Results     LAB RESULTS:          Lab 23  0240 23  0822 08/15/23  0502 23  0231 23  0310 23  0219 23  1517 23  1126 23  0309 08/10/23  1627    WBC 8.91 7.48  --  10.07 12.95* 11.41*   < > 10.20   < >  --    HEMOGLOBIN 8.5* 8.8* 8.6* 8.6* 9.4* 10.0*   < > 11.0*   < >  --    HEMATOCRIT 24.5* 25.9* 25.2* 26.2* 27.1* 28.8*   < > 31.4*   < >  --    PLATELETS 234 225  --  126* 155 160   < > 141   < >  --    NEUTROS ABS  --   --   --   --   --  9.75*  --   --   --   --    IMMATURE GRANS (ABS)  --   --   --   --   --  0.04  --   --   --   --    LYMPHS ABS  --   --   --   --   --  0.79  --   --   --   --    MONOS ABS  --   --   --   --   --  0.77  --   --   --   --    EOS ABS  --   --   --   --   --  0.03  --   --   --   --    MCV 93.9 94.5  --  96.3 92.8 92.0   < > 91.3   < >  --    PROTIME  --   --   --   --   --  14.8*  --  18.3*  --   --    APTT  --   --   --   --   --   --   --  31.6  --   --    HEPARIN ANTI-XA  --   --   --   --   --   --   --   --   --  0.22*    < > = values in this interval not displayed.         Lab 08/17/23  0240 08/16/23  1638 08/16/23  0822 08/15/23  0502 08/14/23  0932 08/14/23  0231 08/13/23  0920 08/13/23  0310 08/12/23  0219 08/11/23  1517 08/11/23  1203 08/11/23  1126   SODIUM 139  --  136 137  --  134*  --  136 139 142 140  --    POTASSIUM 3.8 4.0 3.8 4.2 3.9 3.8   < > 3.8 4.2 3.8  3.8 3.7  --    CHLORIDE 102  --  101 100  --  98  --  100 101 104 103  --    CO2 27.0  --  28.0 28.0  --  28.0  --  24.0 25.0 25.0 23.0  --    ANION GAP 10.0  --  7.0 9.0  --  8.0  --  12.0 13.0 13.0 14.0  --    BUN 15  --  16 22  --  22  --  17 14 14 12  --    CREATININE 0.93  --  0.73* 0.89  --  1.16  --  1.14 1.23 1.07 1.07  --    EGFR 87.2  --  96.7 91.1  --  66.9  --  68.3 62.4 73.7 73.7  --    GLUCOSE 93  --  115* 112*  --  141*  --  158* 183* 136* 142*  --    CALCIUM 8.7  --  8.8 8.7  --  9.2  --  8.8 9.1 9.3 9.6  --    IONIZED CALCIUM  --   --   --   --   --   --   --   --   --   --   --  1.38*   MAGNESIUM  --   --   --   --   --   --   --   --  2.0 2.3 2.4  --    PHOSPHORUS  --   --   --   --   --   --   --   --  5.2* 2.2* 2.3*  --     < > =  values in this interval not displayed.         Lab 08/12/23  0219 08/11/23  1517 08/11/23  1203   TOTAL PROTEIN 6.1  --   --    ALBUMIN 4.7 4.7 4.5   GLOBULIN 1.4  --   --    ALT (SGPT) 32  --   --    AST (SGOT) 66*  --   --    BILIRUBIN 0.7  --   --    ALK PHOS 39  --   --          Lab 08/12/23  0219 08/11/23  1126   PROTIME 14.8* 18.3*   INR 1.15* 1.50*                     Lab 08/11/23  1530 08/11/23  1132   PH, ARTERIAL 7.360 7.415   PCO2, ARTERIAL 42.6 39.4   PO2 ART 91.4 93.6   FIO2 40 100   HCO3 ART 24.0 25.2   BASE EXCESS ART -1.4* 0.6   CARBOXYHEMOGLOBIN 1.5 1.5     Brief Urine Lab Results  (Last result in the past 365 days)        Color   Clarity   Blood   Leuk Est   Nitrite   Protein   CREAT   Urine HCG        08/10/23 0147 Yellow   Clear   Negative   Negative   Negative   Negative                 Microbiology Results (last 10 days)       ** No results found for the last 240 hours. **            Adult Transthoracic Echocardiogram Complete    Result Date: 8/10/2023    Left ventricular systolic function is normal. Estimated left ventricular EF = 70%   The cardiac valves are anatomically and functionally normal.   Estimated right ventricular systolic pressure from tricuspid regurgitation is normal (<35 mmHg).     Spirometry    Result Date: 8/11/2023  Spirometry Interpretation: Normal Spirometry. (Normal FEV1 ratio and FVC).     XR Chest 1 View    Result Date: 8/17/2023  XR CHEST 1 VW Date of Exam: 8/17/2023 3:50 AM EDT Indication: postop Comparison: 8/16/2023. Findings: Sternotomy. Unchanged enlarged cardiac silhouette. Similar scattered subsegmental atelectasis in both lungs. No focal consolidation. No pleural effusion or pneumothorax.     Impression: No significant change to the bilateral subsegmental atelectasis without new disease in the chest. Electronically Signed: Bryan Silva MD  8/17/2023 9:59 AM EDT  Workstation ID: [id number]    XR Chest 1 View    Result Date: 8/16/2023  XR CHEST 1 VW Date of Exam:  8/16/2023 4:00 AM EDT Indication: postop Comparison: 8/15/2023. Findings: There is mild linear atelectasis of the left lower lobe, similar. Previously noted right lung atelectasis has improved and is minimal. There are somewhat low lung volumes. There are no definite effusions. There is stable borderline cardiomegaly.     Impression: Mild left basilar atelectasis. Improved right lung atelectasis. Electronically Signed: Mercy Toth MD  8/16/2023 8:16 AM EDT  Workstation ID: [id number]    XR Chest 1 View    Result Date: 8/15/2023  XR CHEST 1 VW Date of Exam: 8/15/2023 9:42 AM EDT Indication: postop Comparison: 8/13/2023 Findings: Removal of right IJ catheter, mediastinal drains, and chest tube. Sternotomy. Cardiomediastinal silhouette is unchanged. Similar scattered subsegmental atelectasis. No new focal consolidation. No discrete pneumothorax. No significant pleural fluid. Bones  are unchanged.     Impression: No discrete pneumothorax after removal of lines/tubes with similar scattered subsegmental atelectasis and no new disease in the chest. Electronically Signed: Bryan Silva MD  8/15/2023 10:00 AM EDT  Workstation ID: [id number]    XR Chest 1 View    Result Date: 8/13/2023  XR CHEST 1 VW Date of Exam: 8/13/2023 4:01 AM EDT Indication: Post-Op Heart Surgery Comparison: 8/12/2023 at 0453 hours Findings: Postoperative changes are noted. The left chest tube and mediastinal drains are stable. Atelectasis is noted within the lung bases. The right IJ venous sheath is in place. A central line extends to the SVC. There is no evidence for pneumothorax.     Impression: 1.Postoperative changes are noted. The left chest tube and mediastinal drains are stable in position. 2.Mild atelectasis within the lung bases. 3.Stable positioning of the right IJ venous sheath and central line. Electronically Signed: Pako Ojeda  8/13/2023 8:32 AM EDT  Workstation ID: [id number]    XR Chest 1 View    Result Date: 8/12/2023  XR CHEST 1 VW  Date of Exam: 8/12/2023 3:53 AM EDT Indication: Post-Op Heart Surgery Comparison: 8/11/2023 Findings: Sternotomy. Extubation and removal of nasogastric tube. Unchanged position of right IJ catheter, mediastinal drains, and chest tubes. Cardiomediastinal silhouette is unchanged. Low lung volumes with bibasilar atelectasis, similar. Similar trace bilateral  pleural fluid. No measurable pneumothorax. Bones are unchanged.     Impression: Postsurgical chest with similar trace bilateral pleural effusions and bibasilar atelectasis after extubation. Electronically Signed: Bryan Silva MD  8/12/2023 8:10 AM EDT  Workstation ID: [id number]    XR Chest 1 View    Result Date: 8/11/2023  XR CHEST 1 VW Date of Exam: 8/11/2023 11:33 AM EDT Indication: Post-Op Check Line & Tube Placement Comparison: 8/10/2023. Findings: There are new sternal suture wires. There is a new left atrial appendage clip. ET tube is in place several centimeters above the lakeisha. NG tube is directed towards the left upper quadrant although tip is not well seen. There is a left chest tube overlying the left upper lobe. There are 2 mediastinal drains. There is no identifiable pneumothorax. There is mild cardiomegaly. There is mild atelectasis in the lung bases, greatest on the left. There is a right internal jugular sheath with the tip in the SVC.     Impression: Postoperative changes with various tubes and catheters in place as noted. Mild bibasilar atelectasis, greatest on the left. Electronically Signed: Mercy Toth MD  8/11/2023 12:03 PM EDT  Workstation ID: [id number]    XR Chest 1 View    Result Date: 8/10/2023  XR CHEST 1 VW Date of Exam: 8/10/2023 3:55 AM EDT Indication: preop preop Comparison: None available. Findings: The cardiomediastinal silhouette is within normal limits. Lungs are clear. No focal consolidation, pneumothorax, or significant pleural effusion. Osseous structures grossly intact. Advanced left glenohumeral osteoarthritis.      Impression: No acute process. Electronically Signed: Shyam Dominguez  8/10/2023 7:42 AM EDT  Workstation ID: [id number]    Carotid Duplex - Bilateral    Result Date: 8/10/2023    Right internal carotid artery demonstrates a less than 50% stenosis.   Left internal carotid artery demonstrates a less than 50% stenosis.   Antegrade flow in the vertebral arteries bilaterally.     XR Lost Needle / Instrument    Result Date: 8/11/2023  XR LOST NEEDLE/INSTRUMENT Date of Exam: 8/11/2023 10:40 AM EDT Indication: Incorrect count Comparison: None available. Findings: By report there is a missing suture needle. Expected postsurgical changes of CABG with median sternotomy wires and surgical clips. Endotracheal tube terminates in the mid trachea. There is a right IJ introducer sheath in place. A left atrial appendage clip is noted. Midline and bilateral thoracostomy tubes are in place. Mildly low lung volumes with some accentuation of the cardiomediastinal silhouette and likely some streaky bibasilar atelectasis. No pleural effusion. No definite pneumothorax. No unexpected or unexplained metallic/radiopaque foreign bodies or medical devices.     Impression: Postsurgical changes of CABG. No unexplained or unexpected foreign body or medical device. Electronically Signed: Yuniel Machuca  8/11/2023 11:07 AM EDT  Workstation ID: [id number]    OUTSIDE INVASIVE CARDIOLOGY STUDY    Result Date: 8/9/2023  This procedure was auto-finalized with no dictation required.    Arterial Line    Result Date: 8/11/2023  Jerry Bolaños MD     8/11/2023  6:52 AM Arterial Line Patient reassessed immediately prior to procedure Patient location during procedure: pre-op Start time: 8/11/2023 6:35 AM Stop Time:8/11/2023 6:45 AM  Line placed for hemodynamic monitoring. Performed By Anesthesiologist: Jerry Bolaños MD Preanesthetic Checklist Completed: patient identified, IV checked, site marked, risks and benefits discussed, surgical consent,  monitors and equipment checked, pre-op evaluation and timeout performed Arterial Line Prep  Sterile Tech: cap, gloves and sterile barriers Prep: ChloraPrep Patient monitoring: blood pressure monitoring, continuous pulse oximetry and EKG Arterial Line Procedure Laterality:left Location:  radial artery Catheter size: 20 G Guidance: palpation technique Number of attempts: 1 Successful placement: yes Post Assessment Dressing Type: line sutured, occlusive dressing applied, secured with tape and wrist guard applied. Complications no Circ/Move/Sens Assessment: normal and unchanged. Patient Tolerance: patient tolerated the procedure well with no apparent complications     Central Line    Result Date: 8/11/2023  Jerry Bolaños MD     8/11/2023  7:51 AM Central Line Patient reassessed immediately prior to procedure Patient location during procedure: OR Start time: 8/11/2023 7:14 AM Stop Time:8/11/2023 7:22 AM Indications: vascular access Staff Anesthesiologist: Jerry Bolaños MD Preanesthetic Checklist Completed: patient identified, IV checked, site marked, risks and benefits discussed, surgical consent, monitors and equipment checked, pre-op evaluation and timeout performed Central Line Prep Sterile Tech:cap, gloves, gown, mask and sterile barriers Prep: chloraprep Patient monitoring: blood pressure monitoring, continuous pulse oximetry and EKG Central Line Procedure Laterality:right Location:internal jugular Catheter Type:MAC Catheter Size:9 Fr Guidance:ultrasound guided and landmark technique PROCEDURE NOTE/ULTRASOUND INTERPRETATION.  Using ultrasound guidance the potential vascular sites for insertion of the catheter were visualized to determine the patency of the vessel to be used for vascular access.  After selecting the appropriate site for insertion, the needle was visualized under ultrasound being inserted into the internal jugular vein, followed by ultrasound confirmation of wire and catheter  placement. There were no abnormalities seen on ultrasound; an image was taken; and the patient tolerated the procedure with no complications. Images: still images not obtained Assessment Post procedure:biopatch applied, line sutured, occlusive dressing applied and secured with tape Assessement:blood return through all ports, free fluid flow, chest x-ray ordered and Js Test Complications:no Patient Tolerance:patient tolerated the procedure well with no apparent complications     Intra-Op Anesthesia ELVIA    Result Date: 8/11/2023  Regige Camilo MD     8/11/2023  5:46 PM Intra-Op Anesthesia ELVIA Procedure Performed: Intra-Op Anesthesia ELVIA      Start Time:      End Time:  Preanesthesia Checklist: Patient identified, IV assessed, risks and benefits discussed, monitors and equipment assessed, procedure being performed at surgeon's request and anesthesia consent obtained. General Procedure Information Diagnostic Indications for Echo:  assessment of ascending aorta, assessment of surgical repair, defect repair evaluation and hemodynamic monitoring Physician Requesting Echo: Spenser Louise MD Location performed:  OR Intubated Bite block not placed Heart visualized Probe Insertion:  Easy Probe Type:  Multiplane Modalities:  Pulse wave Doppler, continuous wave Doppler, color flow mapping and 2D only Echocardiographic and Doppler Measurements Ventricles Right Ventricle: Cavity size normal.  Global function normal.  Left Ventricle: Cavity size normal.  Global Function normal.  Ejection Fraction 65%.  Ventricular Regional Function: 1- Basal Anteroseptal:  normal 2- Basal Anterior:  normal 3- Basal Anterolateral:  normal 4- Basal Inferolateral:  normal 5- Basal Inferior:  normal 6- Basal Inferoseptal:  normal 7- Mid Anteroseptal:  normal 8- Mid Anterior:  normal 9- Mid Anterolateral:  normal 10- Mid Inferolateral:  normal 11- Mid Inferior:  normal 12- Mid Inferoseptal:  normal 13- Apical Anterior:  normal 14- Apical  Lateral:  normal 15- Apical Inferior:  normal 16- Apical Septal:  normal 17- [place]:  normal Valves Aortic Valve: Annulus normal.  Stenosis not present.  Regurgitation absent.  Leaflets normal.  Leaflet motions normal.  Mitral Valve: Annulus normal.  Stenosis not present.  Regurgitation trace.  Leaflets normal.  Leaflet motions normal.  Tricuspid Valve: Annulus normal.  Stenosis not present.  Regurgitation trace.  Leaflets normal.  Leaflet motions normal.  Other Valve Findings:      ANDREI 3.12 BY PLANIMETRY Aorta Ascending Aorta: Size normal.  Diameter 3.03 cm.  Dissection not present.  Plaque thickness less than 3 mm.  Mobile plaque not present.  Aortic Arch: Size normal.  Diameter 2.28 cm.  Dissection not present.  Plaque thickness less than 3 mm.  Mobile plaque not present.  Descending Aorta: Size normal.  Diameter 2.29 cm.  Dissection not present.  Plaque thickness less than 3 mm.  Mobile plaque not present.  Atria Left Atrium: Left atrial appendage normal. Other Atria Findings:      NO CLOT IN THE PO PO PWD 53.0 Septa Ventricular Septum: Intra-ventricular septum morphology normal.  Anesthesia Information Performed Personally Anesthesiologist:  Reggie Camilo MD      Results for orders placed during the hospital encounter of 08/09/23    Carotid Duplex - Bilateral    Interpretation Summary    Right internal carotid artery demonstrates a less than 50% stenosis.    Left internal carotid artery demonstrates a less than 50% stenosis.    Antegrade flow in the vertebral arteries bilaterally.      Results for orders placed during the hospital encounter of 08/09/23    Carotid Duplex - Bilateral    Interpretation Summary    Right internal carotid artery demonstrates a less than 50% stenosis.    Left internal carotid artery demonstrates a less than 50% stenosis.    Antegrade flow in the vertebral arteries bilaterally.      Results for orders placed during the hospital encounter of 08/09/23    Adult Transthoracic  Echocardiogram Complete    Interpretation Summary    Left ventricular systolic function is normal. Estimated left ventricular EF = 70%    The cardiac valves are anatomically and functionally normal.    Estimated right ventricular systolic pressure from tricuspid regurgitation is normal (<35 mmHg).          Discharge Details        Discharge Medications        New Medications        Instructions Start Date   amiodarone 200 MG tablet  Commonly known as: PACERONE   Take 1 tablet by mouth 2 (Two) Times a Day for 7 days, THEN 1 tablet Daily for 30 days.   Start Date: August 17, 2023     atorvastatin 40 MG tablet  Commonly known as: LIPITOR   40 mg, Oral, Nightly      clopidogrel 75 MG tablet  Commonly known as: PLAVIX   75 mg, Oral, Daily   Start Date: August 18, 2023     ferrous sulfate 325 (65 FE) MG tablet   325 mg, Oral, Daily With Breakfast   Start Date: August 18, 2023     HYDROcodone-acetaminophen 7.5-325 MG per tablet  Commonly known as: NORCO   1 tablet, Oral, Every 8 Hours PRN      insulin detemir 100 UNIT/ML injection  Commonly known as: LEVEMIR   15 Units, Subcutaneous, Nightly      nitroglycerin 0.4 MG SL tablet  Commonly known as: NITROSTAT   0.4 mg, Sublingual, Every 5 Minutes PRN, Take no more than 3 doses in 15 minutes.      pantoprazole 40 MG EC tablet  Commonly known as: PROTONIX  Replaces: omeprazole 20 MG capsule   40 mg, Oral, Every Early Morning   Start Date: August 18, 2023            Changes to Medications        Instructions Start Date   carvedilol 25 MG tablet  Commonly known as: COREG  What changed: how much to take   12.5 mg, Oral, 2 Times Daily With Meals             Continue These Medications        Instructions Start Date   allopurinol 100 MG tablet  Commonly known as: ZYLOPRIM   100 mg, Oral, Daily      aspirin 81 MG EC tablet   81 mg, Oral, Daily      fish oil 1000 MG capsule capsule   2,000 mg, Oral, 2 Times Daily With Meals      gabapentin 600 MG tablet  Commonly known as: NEURONTIN    600 mg, Oral, 2 Times Daily             Stop These Medications      isosorbide mononitrate 30 MG 24 hr tablet  Commonly known as: IMDUR     omeprazole 20 MG capsule  Commonly known as: priLOSEC  Replaced by: pantoprazole 40 MG EC tablet     triamterene-hydrochlorothiazide 37.5-25 MG per capsule  Commonly known as: DYAZIDE              No Known Allergies      Discharge Disposition:  Rehab Facility or Unit (DC - External)    Diet:  Hospital:  Diet Order   Procedures    Diet: Regular/House Diet, Cardiac Diets; Healthy Heart (2-3 Na+); Texture: Regular Texture (IDDSI 7); Fluid Consistency: Thin (IDDSI 0)       Activity:  Activity Instructions       Activity as Tolerated      Bathing Restrictions      You may shower    Type of Restriction: Bathing    Bathing Restrictions: No Tub Bath    Driving Restrictions      Type of Restriction: Driving    Driving Restrictions: No Driving While Taking Narcotics    Lifting Restrictions      Type of Restriction: Lifting    Lifting Restrictions: Lifting Restriction (Indicate Limit)    Weight Limit (Pounds): 10    Length of Lifting Restriction: until seen at next follow-up appointment            Restrictions or Other Recommendations:  No driving until seen by your cardiac surgeon at your follow up appointment. Do not drive while taking narcotics. Get up and get dressed each morning; do not stay in bed. Walking is the best form of exercise because it increases circulation throughout the body and to the heart muscle. Get plenty of sleep at night (8-10 hours). It is important for your breast bone (sternum) to heal properly after surgery.   Please follow these guidelines:  -No lifting, pulling, or pushing greater than 10 lbs for 12 weeks.   -Do not push or pull with your arms, especially when rising from a chair   -Have someone help you get up or roll to your side, and push off with your elbow to get out of bed.  -Avoid activities that cause you to strain or pull on your chest, such as  "driving a  or tractor, raking, vacuuming, moving heavy items, shoveling, opening tight jars or swinging a golf club.  -If you have \"clicking\" in your sternum, avoid activities that cause clicking until the sternum heals.  -Canes or walkers may be used only for balance. Do not place your full weight on any of these devices until the chest is completely healed (usually 12 weeks).       CODE STATUS:    Code Status and Medical Interventions:   Ordered at: 08/10/23 0820     Code Status (Patient has no pulse and is not breathing):    CPR (Attempt to Resuscitate)     Medical Interventions (Patient has pulse or is breathing):    Full Support       Future Appointments   Date Time Provider Department Center   9/15/2023 10:45 AM Ilir Diaz MD [facility]       Additional Instructions for the Follow-ups that You Need to Schedule       Call MD With Problems / Concerns   As directed      Instructions:   Please call the CT Surgery office with any questions or concerns you may have [phone]    Order Comments: Instructions: Please call the CT Surgery office with any questions or concerns you may have [phone]         Discharge Follow-up with PCP   As directed       Currently Documented PCP:    Provider, No Known    PCP Phone Number:    None     Follow Up Details: Follow Up With PCP Within 7-14 Days        Discharge Follow-up with Specialty: Cardiothoracic Surgery   As directed      Follow Up Details: Follow Up in 2-4 Weeks   Specialty: Cardiothoracic Surgery        Discharge Follow-up with Specialty: Dr. Sahara Abernathy; 1 Month   As directed      Specialty: Dr. Sahara Abernathy   Follow Up: 1 Month        Discharge Follow-up with Specialty: [facility]; 1 Week   As directed      [facility]ty: [facility]   Follow Up: 1 Week   Follow Up Details: Follow Up With [facility] Within 7 Days of Discharge. If Discharged on a Weekend, Schedule Follow Up For The Following Friday at 0900. "        Cardiac Rehab Evaluation and Enrollment   Aug 22, 2023      Reason for Consult: Education                Discharge Education:  Post-Op Education:  Patient was advised on responsible use of opioids in the post-surgical setting.  Patient was advised these medications are highly addictive.  Patient advised on proper disposal of unused opioid medication and was urged to discard any unused opioid medication. I reviewed the patient's sternal precautions and outlined the physical activity suitable for each benchmark at the 6-week 3-month and 1 year intervals.  Wound Care:  Patient educated regarding care of post-operative wounds.  Patient is to wash with plain soap and water and pat dry.  Patient is not to use salves on the incision sites.  Patient instructed regarding the signs and symptoms of infection and when to call the office with any concerns.      Special Instructions:   1.  Keep incisions clean and dry at all times. Take a shower daily. Do not take a tub bath or use hot tubs until seen by the cardiac surgeon in your follow-up visit. Clean  your body and incisions daily with Dial soap and water. Always use a clean washcloth. Do not scrub your incision(s). Do not re-use dressings on your incision(s). Do not use any lotions, creams, oils, powders, antibiotic ointment (i.e., Neosporin), peroxide, alcohol, or iodine UNLESS told to do by the cardiac surgeon.  Patient may shower, but no tub baths until CT Surgery followup.   2.  No lifting over 10 lbs until CT Surgery follow up.  3.  No driving until released to do so by the surgeon.      Surgical Leg Precautions:   -Avoid sitting in one position or standing for long periods of time.  -Do not cross your legs.  -Keep your legs elevated while sitting  -Do not use any lotions, creams, oils, powders, antibiotic ointment (i.e. Neosporin), peroxide, alcohol, or iodine unless specifically prescribed by the cardiac surgeon.  -If elastic stockings (CAROLINE hose) were  prescribed to you, wear the stockings while you are up for at least two weeks after discharge. The stockings help decrease swelling. However, remove stockings at bedtime. Wash the stockings with mild soap and water, and make sure they are completely dry before you put them back on.    When to Call the Cardiac Surgeon:  -Increased swelling, redness, tenderness, and drainage  -Increased warmth in the skin around an incision  -Large amount of clear or pinkish drainage  -Sudden increased amount of drainage  -White, yellow, or greenish drainage  -Odor, which may be foul or sweet, coming from an incision  -An opening in your incisions  -Temperature higher than 101 degrees Fahrenheit   -Temperature higher than 100 degrees Fahrenheit two times within 24 hours  -Do not hesitate to call the cardiac surgery nurse navigator or cardiac surgeon if you have concerns or questions.     *The [facility] cardiac surgery nurse navigator is available Monday-Friday 8 a.m. to 4:30 p.m. [phone]  Call 911:  -Chest pain (pain similar to what you experienced prior to surgery)  -Fainting spells  -Bright red stool  -Vomiting bright red blood  -Shortness of breath not relieved by rest  -Sudden numbness or weakness in arms or legs  -Sudden, severe headache  -Heart rate faster than 150 beats/minute with shortness of breath or a new irregular heart rate    JANIE Lopez  08/17/23  13:02 EDT    Time: I spent 35 minutes on this discharge activity which included: face-to-face encounter with the patient, reviewing the data in the system, coordination of the care with the nursing staff as well as consultants, documentation, and entering orders.      Electronically signed by Joyce Paz APRN at 08/17/23 3775

## 2023-08-17 NOTE — CASE MANAGEMENT/SOCIAL WORK
Case Management Discharge Note      Final Note: Pt has bed offer today @ [facility] acute rehab [facility]. RN to call report to [phone] and send d/c packet w/pt. I will fax d/c summary to [phone] when completed. I have arranged [facility] wheelchair transport for 1430 pm today. They will come to pt room for . No other d/c needs @ this time. Pt agreeable to plan.         Selected Continued Care - Admitted Since 8/9/2023       Destination Coordination complete.      Service Provider Selected Services Address Phone Fax Patient Preferred    [facility] Inpatient Rehabilitation [address] [place] [phone] --              Durable Medical Equipment    No services have been selected for the patient.                Dialysis/Infusion    No services have been selected for the patient.                Home Medical Care    No services have been selected for the patient.                Therapy    No services have been selected for the patient.                Community Resources    No services have been selected for the patient.                Community & DME    No services have been selected for the patient.                         Final Discharge Disposition Code: 62 - inpatient rehab facility

## 2023-08-17 NOTE — PROGRESS NOTES
CTS Progress Note       LOS: 7 days   Patient Care Team:  Provider, No Known as PCP - General    Chief Complaint: <principal problem not specified>    Vital Signs:  Temp:  [98.9 øF (37.2 øC)-99.1 øF (37.3 øC)] 98.9 øF (37.2 øC)  Heart Rate:  [69-89] 89  Resp:  [19] 19  BP: (114-148)/(60-75) 114/63    Physical Exam:       Results:     Results from last 7 days   Lab Units 08/17/23  0240   WBC 10*3/mm3 8.91   HEMOGLOBIN g/dL 8.5*   HEMATOCRIT % 24.5*   PLATELETS 10*3/mm3 234     Results from last 7 days   Lab Units 08/17/23  0240   SODIUM mmol/L 139   POTASSIUM mmol/L 3.8   CHLORIDE mmol/L 102   CO2 mmol/L 27.0   BUN mg/dL 15   CREATININE mg/dL 0.93   GLUCOSE mg/dL 93   CALCIUM mg/dL 8.7           Imaging Results (Last 24 Hours)       Procedure Component Value Units Date/Time    XR Chest 1 View [[id number]] Resulted: 08/17/23 0454     Updated: 08/17/23 0457    XR Chest 1 View [[id number]] Collected: 08/16/23 0814     Updated: 08/16/23 0819    Narrative:      XR CHEST 1 VW    Date of Exam: 8/16/2023 4:00 AM EDT    Indication: postop    Comparison: 8/15/2023.    Findings:  There is mild linear atelectasis of the left lower lobe, similar. Previously noted right lung atelectasis has improved and is minimal. There are somewhat low lung volumes. There are no definite effusions. There is stable borderline cardiomegaly.      Impression:      Impression:  Mild left basilar atelectasis. Improved right lung atelectasis.      Electronically Signed: Mercy Toth MD    8/16/2023 8:16 AM EDT    Workstation ID: [id number]            Assessment      MV CAD    T2DM    HTN (hypertension)    Former smoker    Vapes nicotine containing substance    GERD        Plan   Should be able to transfer to [facility] anytime    Please note that portions of this note were completed with a voice recognition program. Efforts were made to edit the dictations, but occasionally words are mistranscribed.    Spenser Louise MD  08/17/23  07:42  EDT

## 2023-08-17 NOTE — PROGRESS NOTES
[facility] Medicine Services  PROGRESS NOTE    Patient Name: Dutch Arellano  : 1950  MRN: [id number]    Date of Admission: 2023  Primary Care Physician: Provider, No Known    Subjective     CC: f/u CABG    HPI:   Up on side of bed. NAD. Family in room. No pain. Awaiting ride to rehab today.     ROS:  Gen- No fevers, chills  CV- No chest pain, palpitations  Resp- No cough, dyspnea  GI- No N/V/D, abd pain    Objective     Vital Signs:   Temp:  [98.9 øF (37.2 øC)-99.6 øF (37.6 øC)] 99.6 øF (37.6 øC)  Heart Rate:  [69-90] 90  Resp:  [16-19] 16  BP: (114-157)/(60-75) 136/71     Physical Exam:  Constitutional: No acute distress, awake, alert, chronically ill appearing up on side of bed  HENT: NCAT, mucous membranes moist  Respiratory: Clear to auscultation bilaterally, respiratory effort normal   Cardiovascular: RRR, no murmurs, rubs, or gallops  Gastrointestinal: Positive bowel sounds, soft, nontender, nondistended  Musculoskeletal: No left ankle edema; right AKA  Psychiatric: Appropriate affect, cooperative  Neurologic: Oriented x 3, strength symmetric in all extremities, Cranial Nerves grossly intact to confrontation, speech clear  Skin: No rashes; RLE EVG sites with staples without erythema or increased warmth- no drainage     Results Reviewed:  LAB RESULTS:      Lab 23  0240 23  0822 08/15/23  0502 23  0231 23  0310 23  0219 23  1517 23  1126 23  0309 08/10/23  1627   WBC 8.91 7.48  --  10.07 12.95* 11.41*   < > 10.20   < >  --    HEMOGLOBIN 8.5* 8.8* 8.6* 8.6* 9.4* 10.0*   < > 11.0*   < >  --    HEMATOCRIT 24.5* 25.9* 25.2* 26.2* 27.1* 28.8*   < > 31.4*   < >  --    PLATELETS 234 225  --  126* 155 160   < > 141   < >  --    NEUTROS ABS  --   --   --   --   --  9.75*  --   --   --   --    IMMATURE GRANS (ABS)  --   --   --   --   --  0.04  --   --   --   --    LYMPHS ABS  --   --   --   --   --  0.79  --   --   --   --    MONOS  ABS  --   --   --   --   --  0.77  --   --   --   --    EOS ABS  --   --   --   --   --  0.03  --   --   --   --    MCV 93.9 94.5  --  96.3 92.8 92.0   < > 91.3   < >  --    PROTIME  --   --   --   --   --  14.8*  --  18.3*  --   --    APTT  --   --   --   --   --   --   --  31.6  --   --    HEPARIN ANTI-XA  --   --   --   --   --   --   --   --   --  0.22*    < > = values in this interval not displayed.         Lab 08/17/23  0240 08/16/23  1638 08/16/23  0822 08/15/23  0502 08/14/23  0932 08/14/23  0231 08/13/23  0920 08/13/23  0310 08/12/23  0219 08/11/23  1517 08/11/23  1203 08/11/23  1126   SODIUM 139  --  136 137  --  134*  --  136 139 142 140  --    POTASSIUM 3.8 4.0 3.8 4.2 3.9 3.8   < > 3.8 4.2 3.8  3.8 3.7  --    CHLORIDE 102  --  101 100  --  98  --  100 101 104 103  --    CO2 27.0  --  28.0 28.0  --  28.0  --  24.0 25.0 25.0 23.0  --    ANION GAP 10.0  --  7.0 9.0  --  8.0  --  12.0 13.0 13.0 14.0  --    BUN 15  --  16 22  --  22  --  17 14 14 12  --    CREATININE 0.93  --  0.73* 0.89  --  1.16  --  1.14 1.23 1.07 1.07  --    EGFR 87.2  --  96.7 91.1  --  66.9  --  68.3 62.4 73.7 73.7  --    GLUCOSE 93  --  115* 112*  --  141*  --  158* 183* 136* 142*  --    CALCIUM 8.7  --  8.8 8.7  --  9.2  --  8.8 9.1 9.3 9.6  --    IONIZED CALCIUM  --   --   --   --   --   --   --   --   --   --   --  1.38*   MAGNESIUM  --   --   --   --   --   --   --   --  2.0 2.3 2.4  --    PHOSPHORUS  --   --   --   --   --   --   --   --  5.2* 2.2* 2.3*  --     < > = values in this interval not displayed.         Lab 08/12/23  0219 08/11/23  1517 08/11/23  1203   TOTAL PROTEIN 6.1  --   --    ALBUMIN 4.7 4.7 4.5   GLOBULIN 1.4  --   --    ALT (SGPT) 32  --   --    AST (SGOT) 66*  --   --    BILIRUBIN 0.7  --   --    ALK PHOS 39  --   --          Lab 08/12/23  0219 08/11/23  1126   PROTIME 14.8* 18.3*   INR 1.15* 1.50*                 Lab 08/11/23  1530 08/11/23  1132   PH, ARTERIAL 7.360 7.415   PCO2, ARTERIAL 42.6 39.4   PO2  ART 91.4 93.6   FIO2 40 100   HCO3 ART 24.0 25.2   BASE EXCESS ART -1.4* 0.6   CARBOXYHEMOGLOBIN 1.5 1.5     Brief Urine Lab Results  (Last result in the past 365 days)        Color   Clarity   Blood   Leuk Est   Nitrite   Protein   CREAT   Urine HCG        08/10/23 0147 Yellow   Clear   Negative   Negative   Negative   Negative                 Microbiology Results Abnormal       None          XR Chest 1 View    Result Date: 8/17/2023  XR CHEST 1 VW Date of Exam: 8/17/2023 3:50 AM EDT Indication: postop Comparison: 8/16/2023. Findings: Sternotomy. Unchanged enlarged cardiac silhouette. Similar scattered subsegmental atelectasis in both lungs. No focal consolidation. No pleural effusion or pneumothorax.     Impression: Impression: No significant change to the bilateral subsegmental atelectasis without new disease in the chest. Electronically Signed: Bryan Silva MD  8/17/2023 9:59 AM EDT  Workstation ID: [id number]    XR Chest 1 View    Result Date: 8/16/2023  XR CHEST 1 VW Date of Exam: 8/16/2023 4:00 AM EDT Indication: postop Comparison: 8/15/2023. Findings: There is mild linear atelectasis of the left lower lobe, similar. Previously noted right lung atelectasis has improved and is minimal. There are somewhat low lung volumes. There are no definite effusions. There is stable borderline cardiomegaly.     Impression: Impression: Mild left basilar atelectasis. Improved right lung atelectasis. Electronically Signed: Mercy Toth MD  8/16/2023 8:16 AM EDT  Workstation ID: [id number]     Results for orders placed during the hospital encounter of 08/09/23    Adult Transthoracic Echocardiogram Complete    Interpretation Summary    Left ventricular systolic function is normal. Estimated left ventricular EF = 70%    The cardiac valves are anatomically and functionally normal.    Estimated right ventricular systolic pressure from tricuspid regurgitation is normal (<35 mmHg).    Current medications:  Scheduled  Meds:acetaminophen, 650 mg, Oral, Q8H  amiodarone, 200 mg, Oral, Q8H   Followed by  [START ON 8/20/2023] amiodarone, 200 mg, Oral, Q12H   Followed by  [START ON 9/3/2023] amiodarone, 200 mg, Oral, Daily  aspirin, 81 mg, Oral, Daily  atorvastatin, 40 mg, Oral, Nightly  carvedilol, 12.5 mg, Oral, BID With Meals  clopidogrel, 75 mg, Oral, Daily  ferrous sulfate, 325 mg, Oral, Daily With Breakfast  gabapentin, 300 mg, Oral, Q12H  insulin detemir, 15 Units, Subcutaneous, Nightly  insulin lispro, 3-14 Units, Subcutaneous, 4x Daily AC & at Bedtime  pantoprazole, 40 mg, Oral, Q AM  pharmacy consult - MTM, , Does not apply, Daily  senna-docusate sodium, 2 tablet, Oral, BID  sodium chloride, 10 mL, Intravenous, Q12H      Continuous Infusions:   PRN Meds:.  albumin human    senna-docusate sodium **AND** polyethylene glycol **AND** bisacodyl **AND** bisacodyl    Calcium Replacement - Follow Nurse / BPA Driven Protocol    HYDROcodone-acetaminophen    ipratropium-albuterol    Magnesium Cardiology Dose Replacement - Follow Nurse / BPA Driven Protocol    nitroglycerin    ondansetron    Phosphorus Replacement - Follow Nurse / BPA Driven Protocol    Potassium Replacement - Follow Nurse / BPA Driven Protocol    sodium chloride    sodium chloride    Assessment & Plan     Active Hospital Problems    Diagnosis  POA    T2DM [E11.9]  Yes    HTN (hypertension) [I10]  Yes    Former smoker [Z87.891]  Not Applicable    Vapes nicotine containing substance [Z72.0]  Yes    GERD [K21.9]  Yes    MV CAD [I25.10]  Yes      Resolved Hospital Problems   No resolved problems to display.     Brief Hospital Course to date:  Dutch Arellano is a 73 y.o. male with PMH significant for HTN, HLD, DMII, former smoker (now vapes), liposarcoma s/p R AKA and CAD s/p stent x 5. Initially presented to [facility] ED 8/8/23 for evaluation of chest pain/fatigue x 2 weeks. LHC revealed MVCAD and he was transferred to [facility] on 8/3/23 for CABG  evaluation. He is s/p CABG x 4 and PO ligation by Dr. Louise on 8/11/23. Hospitalization complicated by post-op atrial fibrillation    Multivessel CAD  Hyperlipidemia   - s/p CABG x 4 and PO ligtation by Dr. Louise 8/11/23  - ASA / Plavix / statin and BB  - Post-operative care per CT surgery     Postop atrial fibrillation  - s/p IV Amiodarone, now on PO. Has converted to NSR  - AC per cardiology but anticipate will defer as was only in AF briefly and has PO clip     Post-op anemia  - Hgb 14.8 on admission, now stable in 8-9 range  - Add PO iron     HTN  - BP controlled. Continue Carvedilol 12.5mg BID     Non-insulin dependent DMII  - A1c 7.1%  - No specific therapy for DMII on home med list  - Good control here on Levemir 15 units QHS + SSI; will cont for rehab today     Expected Discharge Location and Transportation: SNF today per primary team  Expected Discharge Expected Discharge Date: 8/17/2023; Expected Discharge Time:      DVT prophylaxis: Mechanical DVT prophylaxis orders are present.     AM-PAC 6 Clicks Score (PT): 23 (08/17/23 0800)    CODE STATUS:   Code Status and Medical Interventions:   Ordered at: 08/10/23 0820     Code Status (Patient has no pulse and is not breathing):    CPR (Attempt to Resuscitate)     Medical Interventions (Patient has pulse or is breathing):    Full Support     Eusebia Oh, APRN  08/17/23

## 2023-08-17 NOTE — PLAN OF CARE
Goal Outcome Evaluation:  Plan of Care Reviewed With: patient        Progress: improving  Outcome Evaluation: Physical therapy treatment complete. The patient continues to require additional time to complete mobility. Patient improved tolerance to mobility. Patient increased ambulation distance compared to previous treatment session. The patient continues to present below baseline for functional mobility. The patient would continue to benefit from skilled PT to address strength, balance and activity tolerance deficits. Continue to current PT POC      Anticipated Discharge Disposition (PT): inpatient rehabilitation facility

## 2023-08-17 NOTE — PROGRESS NOTES
"  [facility] at [facility]  PROGRESS NOTE    Date of Admission: 8/9/2023  Date of Service: 08/17/23    Primary Care Physician: Provider, No Known    Chief Complaint: f/u HTN, HLD, brief post op Afib   Problem List:   MV CAD    T2DM    HTN (hypertension)    Former smoker    Vapes nicotine containing substance    GERD      Subjective      HPI: Resting comfortably, eager for discharge      Objective   Vitals: /63   Pulse 89   Temp 98.9 øF (37.2 øC) (Oral)   Resp 19   Ht 180.3 cm (71\")   Wt 88.5 kg (195 lb 3.2 oz)   SpO2 94%   BMI 27.22 kg/mý     Physical Exam:  General Appearance:   ú well developed  ú well nourished  Neck:  ú thyroid not enlarged  ú supple  Respiratory:  ú no respiratory distress  ú normal breath sounds  ú no rales  Cardiovascular:  ú no jugular venous distention  ú regular rhythm  ú apical impulse normal  ú S1 normal, S2 normal  ú no S3, no S4   ú no murmur  ú no rub, no thrill  ú carotid pulses normal; no bruit  ú pedal pulses normal  ú lower extremity edema: Right AKA, left 1+  Skin:   warm, dry      Results:  Results from last 7 days   Lab Units 08/17/23  0240 08/16/23  0822 08/15/23  0502 08/14/23  0231   WBC 10*3/mm3 8.91 7.48  --  10.07   HEMOGLOBIN g/dL 8.5* 8.8* 8.6* 8.6*   HEMATOCRIT % 24.5* 25.9* 25.2* 26.2*   PLATELETS 10*3/mm3 234 225  --  126*     Results from last 7 days   Lab Units 08/17/23  0240 08/16/23  1638 08/16/23  0822 08/15/23  0502   SODIUM mmol/L 139  --  136 137   POTASSIUM mmol/L 3.8 4.0 3.8 4.2   CHLORIDE mmol/L 102  --  101 100   CO2 mmol/L 27.0  --  28.0 28.0   BUN mg/dL 15  --  16 22   CREATININE mg/dL 0.93  --  0.73* 0.89   GLUCOSE mg/dL 93  --  115* 112*      Lab Results   Component Value Date    CHOL 192 08/10/2023    TRIG 474 (H) 08/10/2023    HDL 29 (L) 08/10/2023    LDL 86 08/10/2023    AST 66 (H) 08/12/2023    ALT 32 08/12/2023                     Results from last 7 days   Lab Units 08/12/23  0219 08/11/23  1126   PROTIME " Seconds 14.8* 18.3*   INR  1.15* 1.50*   APTT seconds  --  31.6                 Intake/Output Summary (Last 24 hours) at 8/17/2023 0838  Last data filed at 8/16/2023 1300  Gross per 24 hour   Intake 720 ml   Output --   Net 720 ml     I personally reviewed the patient's EKG/Telemetry data    Current Medications:  acetaminophen, 650 mg, Oral, Q8H  amiodarone, 200 mg, Oral, Q8H   Followed by  [START ON 8/20/2023] amiodarone, 200 mg, Oral, Q12H   Followed by  [START ON 9/3/2023] amiodarone, 200 mg, Oral, Daily  aspirin, 81 mg, Oral, Daily  atorvastatin, 40 mg, Oral, Nightly  carvedilol, 12.5 mg, Oral, BID With Meals  clopidogrel, 75 mg, Oral, Daily  ferrous sulfate, 325 mg, Oral, Daily With Breakfast  gabapentin, 300 mg, Oral, Q12H  hyaluronidase 150 units in NS 10 ml syringe, 150 Units, Subcutaneous, Once  insulin detemir, 15 Units, Subcutaneous, Nightly  insulin lispro, 3-14 Units, Subcutaneous, 4x Daily AC & at Bedtime  pantoprazole, 40 mg, Oral, Q AM  pharmacy consult - MTM, , Does not apply, Daily  senna-docusate sodium, 2 tablet, Oral, BID  sodium chloride, 10 mL, Intravenous, Q12H         Assessment and Plan:   MVCAD  - normal EF pre-op  - CABG x4 with Dr. Louise 8/11 (LIMA-LAD, SVG to diagonal, OM and RCA)  - PO clip with #35 Atricure clip  - continue ASA, statin      2. Hypertension   - on BB, overall currently well controlled  - consider ACE/ARB if needed as next agent will hold off for now     3. Hyperlipidemia   - LDL 86, Trig 474  - on Lipitor 40mg     4. DM2  - A1C 7.1%  - per intensivists     5. PAF  - amiodarone protocol initiated  - remains in NSR  - defer anticoagulation at this point given brief atrial fibrillation and recent left atrial appendage closure, confirmed by ELVIA, personally reviewed images  Home with amiodarone 200 mg twice daily for 7 days then 200 mg daily until follow-up with cardiology in [place]     SNF placement per case management.  Stable for discharge to rehab when bed  available       Electronically signed by Laureen Perez PA-C, 08/17/23, 8:39 AM EDT.    I have seen and examined the patient, performing a face-to-face diagnostic evaluation with plan of care reviewed and developed with the Advanced Practice Clinician and nursing staff. I have addended and modified the above history of present illness, physical examination, and assessment and plan to reflect my findings and impressions. > 50% of the combined encounter time was performed by Dr. Huggins.    Spenser Huggins MD, [facility], [facility]  08/17/23

## 2023-08-21 LAB
BASE EXCESS BLDA CALC-SCNC: -2 MMOL/L (ref -5–5)
BASE EXCESS BLDA CALC-SCNC: -2 MMOL/L (ref -5–5)
BASE EXCESS BLDA CALC-SCNC: 2 MMOL/L (ref -5–5)
BASE EXCESS BLDA CALC-SCNC: 3 MMOL/L (ref -5–5)
BASE EXCESS BLDA CALC-SCNC: 4 MMOL/L (ref -5–5)
BASE EXCESS BLDA CALC-SCNC: 4 MMOL/L (ref -5–5)
BASE EXCESS BLDA CALC-SCNC: 6 MMOL/L (ref -5–5)
CA-I BLDA-SCNC: 1.01 MMOL/L (ref 1.2–1.32)
CA-I BLDA-SCNC: 1.02 MMOL/L (ref 1.2–1.32)
CA-I BLDA-SCNC: 1.06 MMOL/L (ref 1.2–1.32)
CA-I BLDA-SCNC: 1.18 MMOL/L (ref 1.2–1.32)
CA-I BLDA-SCNC: 1.32 MMOL/L (ref 1.2–1.32)
CA-I BLDA-SCNC: 1.54 MMOL/L (ref 1.2–1.32)
CA-I BLDA-SCNC: 2.03 MMOL/L (ref 1.2–1.32)
CO2 BLDA-SCNC: 24 MMOL/L (ref 24–29)
CO2 BLDA-SCNC: 26 MMOL/L (ref 24–29)
CO2 BLDA-SCNC: 29 MMOL/L (ref 24–29)
CO2 BLDA-SCNC: 30 MMOL/L (ref 24–29)
CO2 BLDA-SCNC: 32 MMOL/L (ref 24–29)
GLUCOSE BLDC GLUCOMTR-MCNC: 156 MG/DL (ref 70–130)
GLUCOSE BLDC GLUCOMTR-MCNC: 157 MG/DL (ref 70–130)
GLUCOSE BLDC GLUCOMTR-MCNC: 161 MG/DL (ref 70–130)
GLUCOSE BLDC GLUCOMTR-MCNC: 172 MG/DL (ref 70–130)
GLUCOSE BLDC GLUCOMTR-MCNC: 195 MG/DL (ref 70–130)
GLUCOSE BLDC GLUCOMTR-MCNC: 200 MG/DL (ref 70–130)
GLUCOSE BLDC GLUCOMTR-MCNC: 202 MG/DL (ref 70–130)
HCO3 BLDA-SCNC: 23.1 MMOL/L (ref 22–26)
HCO3 BLDA-SCNC: 24.7 MMOL/L (ref 22–26)
HCO3 BLDA-SCNC: 27.5 MMOL/L (ref 22–26)
HCO3 BLDA-SCNC: 28.3 MMOL/L (ref 22–26)
HCO3 BLDA-SCNC: 28.6 MMOL/L (ref 22–26)
HCO3 BLDA-SCNC: 28.8 MMOL/L (ref 22–26)
HCO3 BLDA-SCNC: 30.7 MMOL/L (ref 22–26)
HCT VFR BLDA CALC: 25 % (ref 38–51)
HCT VFR BLDA CALC: 25 % (ref 38–51)
HCT VFR BLDA CALC: 27 % (ref 38–51)
HCT VFR BLDA CALC: 30 % (ref 38–51)
HCT VFR BLDA CALC: 30 % (ref 38–51)
HCT VFR BLDA CALC: 33 % (ref 38–51)
HCT VFR BLDA CALC: 39 % (ref 38–51)
HGB BLDA-MCNC: 10.2 G/DL (ref 12–17)
HGB BLDA-MCNC: 10.2 G/DL (ref 12–17)
HGB BLDA-MCNC: 11.2 G/DL (ref 12–17)
HGB BLDA-MCNC: 13.3 G/DL (ref 12–17)
HGB BLDA-MCNC: 8.5 G/DL (ref 12–17)
HGB BLDA-MCNC: 8.5 G/DL (ref 12–17)
HGB BLDA-MCNC: 9.2 G/DL (ref 12–17)
PCO2 BLDA: 41.4 MM HG (ref 35–45)
PCO2 BLDA: 42.9 MM HG (ref 35–45)
PCO2 BLDA: 45.8 MM HG (ref 35–45)
PCO2 BLDA: 45.9 MM HG (ref 35–45)
PCO2 BLDA: 48.6 MM HG (ref 35–45)
PCO2 BLDA: 49.4 MM HG (ref 35–45)
PCO2 BLDA: 50.1 MM HG (ref 35–45)
PH BLDA: 7.3 PH UNITS (ref 7.35–7.6)
PH BLDA: 7.35 PH UNITS (ref 7.35–7.6)
PH BLDA: 7.37 PH UNITS (ref 7.35–7.6)
PH BLDA: 7.38 PH UNITS (ref 7.35–7.6)
PH BLDA: 7.39 PH UNITS (ref 7.35–7.6)
PH BLDA: 7.43 PH UNITS (ref 7.35–7.6)
PH BLDA: 7.44 PH UNITS (ref 7.35–7.6)
PO2 BLDA: 106 MMHG (ref 80–105)
PO2 BLDA: 296 MMHG (ref 80–105)
PO2 BLDA: 309 MMHG (ref 80–105)
PO2 BLDA: 344 MMHG (ref 80–105)
PO2 BLDA: 39 MMHG (ref 80–105)
PO2 BLDA: 82 MMHG (ref 80–105)
PO2 BLDA: 94 MMHG (ref 80–105)
POTASSIUM BLDA-SCNC: 4 MMOL/L (ref 3.5–4.9)
POTASSIUM BLDA-SCNC: 4.2 MMOL/L (ref 3.5–4.9)
POTASSIUM BLDA-SCNC: 4.7 MMOL/L (ref 3.5–4.9)
POTASSIUM BLDA-SCNC: 5.1 MMOL/L (ref 3.5–4.9)
POTASSIUM BLDA-SCNC: 5.3 MMOL/L (ref 3.5–4.9)
POTASSIUM BLDA-SCNC: 5.4 MMOL/L (ref 3.5–4.9)
POTASSIUM BLDA-SCNC: 5.5 MMOL/L (ref 3.5–4.9)
SAO2 % BLDA: 100 % (ref 95–98)
SAO2 % BLDA: 72 % (ref 95–98)
SAO2 % BLDA: 96 % (ref 95–98)
SAO2 % BLDA: 97 % (ref 95–98)
SAO2 % BLDA: 97 % (ref 95–98)
SODIUM BLD-SCNC: 134 MMOL/L (ref 138–146)
SODIUM BLD-SCNC: 138 MMOL/L (ref 138–146)

## 2023-08-28 NOTE — PROGRESS NOTES
"[facility]  Heart and Valve Center    Chief Complaint  Post-op Open Heart Surgery    Subjective    History of Present Illness {CC  Problem List  Visit  Diagnosis   Encounters  Notes  Medications  Labs  Result Review Imaging  Media :23}     Dutch Arellano is a 73 y.o. male with diabetes, liposarcoma status post right AKA, hypertension, CAD status post multiple stents who presents today as a hospital referral for CAD.    Patient underwent CABG x4 and left atrial appendage ligation with Dr. Louise on 8/11/2023. Normal LVEF.  He had postoperative A-fib and was placed on amiodarone protocol.  Left atrial appendage closure confirmed by ELVIA. No need for anticoagulation. [facility]. He was discharged last week. . Denies shortness of breath. Had one episode of heart racing and occasional fluttering. He was started on furosemide 40mg BID and potassium by rehab center. Wife reports he had a chest xray because of a cough.  He reports his cough is improved.  He does have left lower extremity edema.  He admits that he does not elevate his leg           Objective     Vital Signs:   Vitals:    08/29/23 1325 08/29/23 1326 08/29/23 1327   BP: 120/58 122/68 126/66   BP Location: Right arm Left arm Left arm   Patient Position: Sitting Standing Sitting   Cuff Size: Adult Adult Adult   Pulse: 65 72 66   Resp:   18   Temp: 97.5 øF (36.4 øC) 97.5 øF (36.4 øC) 97.5 øF (36.4 øC)   TempSrc: Temporal Temporal Temporal   SpO2: 93% 92% 94%   Weight:   93.5 kg (206 lb 3.2 oz)   Height:   180.3 cm (71\")     Body mass index is 28.76 kg/mý.  Physical Exam  Vitals reviewed.   Constitutional:       Appearance: Normal appearance.   HENT:      Head: Normocephalic.   Neck:      Vascular: No carotid bruit.   Cardiovascular:      Rate and Rhythm: Normal rate and regular rhythm.      Pulses: Normal pulses.      Heart sounds: Normal heart sounds, S1 normal and S2 normal. No murmur " heard.  Pulmonary:      Effort: Pulmonary effort is normal. No respiratory distress.      Breath sounds: Normal breath sounds.   Chest:      Chest wall: No tenderness.   Abdominal:      General: Abdomen is flat.      Palpations: Abdomen is soft.   Musculoskeletal:      Cervical back: Neck supple.      Right lower leg: No edema.      Left lower leg: No edema.   Skin:     General: Skin is warm and dry.      Comments: Midsternal incision, MT sites are clean dry and intact with no drainage.  EVH sites x2 have staples present in the left lower extremity.  There is some mild erythema surrounding the sites   Neurological:      General: No focal deficit present.      Mental Status: He is alert and oriented to person, place, and time. Mental status is at baseline.   Psychiatric:         Mood and Affect: Mood normal.         Behavior: Behavior normal.         Thought Content: Thought content normal.            Result Review  Data Reviewed:{ Labs  Result Review  Imaging  Med Tab  Media :23}   POC Glucose Once (08/17/2023 11:39)  Basic Metabolic Panel (08/17/2023 02:40)  Potassium (08/16/2023 16:38)  ECG 12 Lead Drug Monitoring; Amiodarone (08/15/2023 05:38)   Adult Transthoracic Echocardiogram Complete (08/10/2023 16:51)   ECG 12 Lead (08/29/2023 13:32)          Assessment and Plan {CC Problem List  Visit Diagnosis  ROS  Review (Popup)  Health Maintenance  Quality  BestPractice  Medications  SmartSets  SnapShot Encounters  Media :23}   1. Postoperative atrial fibrillation  -Maintaining normal sinus rhythm.  QTc stable.  Continue amiodarone 4 mg daily.  -Status post left atrial appendage ligation  - ECG 12 Lead; Future  - Basic Metabolic Panel; Future  - proBNP; Future    2. Coronary artery disease involving native coronary artery of native heart without   - S/P CABG 8/11/23  -He does have some mild erythema of his left lower extremity, likely secondary to swelling.  I encouraged him to start elevating his leg.   We will do a venous duplex to rule out DVT due to swelling and calf tenderness.  I have advised his wife to call me if the erythema does not improve with elevation as it may represent some early onset of cellulitis    3. Primary hypertension  - Well controlled  - Continue carvedilol    4. Edema of left lower extremity  -Encourage elevation of left lower extremity and to call if erythema persists or worsens  - Duplex Venous Lower Extremity - Left CAR; Future  - Basic Metabolic Panel; Future  - proBNP; Future    5. Postoperative fluid overload  - Continue lasix 40mg BID and supplemental potassium  - Labs today  - Will try and obtain CXR from rehab center  - proBNP; Future    Keep scheduled follow up with [facility] and local cardiology    Follow Up {Instructions Charge Capture  Follow-up Communications :23}   Return if symptoms worsen or fail to improve.    Patient was given instructions and counseling regarding his condition or for health maintenance advice. Please see specific information pulled into the AVS if appropriate.  Advised to call the Heart and Valve Center with any questions, concerns, or worsening symptoms.

## 2023-08-29 ENCOUNTER — HOSPITAL ENCOUNTER (OUTPATIENT)
Dept: CARDIOLOGY | Facility: HOSPITAL | Age: 73
Discharge: HOME | End: 2023-08-29
Payer: MEDICARE

## 2023-08-29 ENCOUNTER — LAB (OUTPATIENT)
Dept: LAB | Facility: HOSPITAL | Age: 73
End: 2023-08-29
Payer: MEDICARE

## 2023-08-29 ENCOUNTER — OFFICE VISIT (OUTPATIENT)
Dept: CARDIOLOGY | Facility: HOSPITAL | Age: 73
End: 2023-08-29
Payer: MEDICARE

## 2023-08-29 VITALS
RESPIRATION RATE: 18 BRPM | DIASTOLIC BLOOD PRESSURE: 66 MMHG | SYSTOLIC BLOOD PRESSURE: 126 MMHG | TEMPERATURE: 97.5 F | OXYGEN SATURATION: 94 % | WEIGHT: 206.2 LBS | BODY MASS INDEX: 28.87 KG/M2 | HEIGHT: 71 IN | HEART RATE: 66 BPM

## 2023-08-29 DIAGNOSIS — I97.89 POSTOPERATIVE ATRIAL FIBRILLATION: ICD-10-CM

## 2023-08-29 DIAGNOSIS — R60.0 EDEMA OF LEFT LOWER EXTREMITY: ICD-10-CM

## 2023-08-29 DIAGNOSIS — E87.70 HYPERVOLEMIA, UNSPECIFIED HYPERVOLEMIA TYPE: ICD-10-CM

## 2023-08-29 DIAGNOSIS — I25.10 CORONARY ARTERY DISEASE INVOLVING NATIVE CORONARY ARTERY OF NATIVE HEART WITHOUT ANGINA PECTORIS: ICD-10-CM

## 2023-08-29 DIAGNOSIS — I48.91 POSTOPERATIVE ATRIAL FIBRILLATION: ICD-10-CM

## 2023-08-29 DIAGNOSIS — R06.02 SHORTNESS OF BREATH: ICD-10-CM

## 2023-08-29 DIAGNOSIS — I97.89 POSTOPERATIVE ATRIAL FIBRILLATION: Primary | ICD-10-CM

## 2023-08-29 DIAGNOSIS — I48.91 POSTOPERATIVE ATRIAL FIBRILLATION: Primary | ICD-10-CM

## 2023-08-29 DIAGNOSIS — I10 PRIMARY HYPERTENSION: Chronic | ICD-10-CM

## 2023-08-29 LAB
ANION GAP SERPL CALCULATED.3IONS-SCNC: 11.2 MMOL/L (ref 5–15)
BH CV LOW VAS RIGHT COMMON FEMORAL SPONT: 1
BH CV LOWER VASCULAR LEFT COMMON FEMORAL AUGMENT: NORMAL
BH CV LOWER VASCULAR LEFT COMMON FEMORAL COMPRESS: NORMAL
BH CV LOWER VASCULAR LEFT COMMON FEMORAL PHASIC: NORMAL
BH CV LOWER VASCULAR LEFT COMMON FEMORAL SPONT: NORMAL
BH CV LOWER VASCULAR LEFT DISTAL FEMORAL AUGMENT: NORMAL
BH CV LOWER VASCULAR LEFT DISTAL FEMORAL COMPRESS: NORMAL
BH CV LOWER VASCULAR LEFT DISTAL FEMORAL PHASIC: NORMAL
BH CV LOWER VASCULAR LEFT DISTAL FEMORAL SPONT: NORMAL
BH CV LOWER VASCULAR LEFT GASTRONEMIUS COMPRESS: NORMAL
BH CV LOWER VASCULAR LEFT LESSER SAPH COMPRESS: NORMAL
BH CV LOWER VASCULAR LEFT MID FEMORAL AUGMENT: NORMAL
BH CV LOWER VASCULAR LEFT MID FEMORAL COMPRESS: NORMAL
BH CV LOWER VASCULAR LEFT MID FEMORAL PHASIC: NORMAL
BH CV LOWER VASCULAR LEFT MID FEMORAL SPONT: NORMAL
BH CV LOWER VASCULAR LEFT PERONEAL AUGMENT: NORMAL
BH CV LOWER VASCULAR LEFT PERONEAL COMPRESS: NORMAL
BH CV LOWER VASCULAR LEFT POPLITEAL AUGMENT: NORMAL
BH CV LOWER VASCULAR LEFT POPLITEAL COMPRESS: NORMAL
BH CV LOWER VASCULAR LEFT POPLITEAL PHASIC: NORMAL
BH CV LOWER VASCULAR LEFT POPLITEAL SPONT: NORMAL
BH CV LOWER VASCULAR LEFT POSTERIOR TIBIAL AUGMENT: NORMAL
BH CV LOWER VASCULAR LEFT POSTERIOR TIBIAL COMPRESS: NORMAL
BH CV LOWER VASCULAR LEFT PROFUNDA FEMORAL AUGMENT: NORMAL
BH CV LOWER VASCULAR LEFT PROFUNDA FEMORAL PHASIC: NORMAL
BH CV LOWER VASCULAR LEFT PROFUNDA FEMORAL SPONT: NORMAL
BH CV LOWER VASCULAR LEFT PROXIMAL FEMORAL AUGMENT: NORMAL
BH CV LOWER VASCULAR LEFT PROXIMAL FEMORAL COMPRESS: NORMAL
BH CV LOWER VASCULAR LEFT PROXIMAL FEMORAL PHASIC: NORMAL
BH CV LOWER VASCULAR LEFT PROXIMAL FEMORAL SPONT: NORMAL
BH CV LOWER VASCULAR LEFT SAPHENOFEMORAL JUNCTION AUGMENT: NORMAL
BH CV LOWER VASCULAR LEFT SAPHENOFEMORAL JUNCTION COMPRESS: NORMAL
BH CV LOWER VASCULAR LEFT SAPHENOFEMORAL JUNCTION PHASIC: NORMAL
BH CV LOWER VASCULAR LEFT SAPHENOFEMORAL JUNCTION SPONT: NORMAL
BH CV LOWER VASCULAR RIGHT COMMON FEMORAL AUGMENT: NORMAL
BH CV LOWER VASCULAR RIGHT COMMON FEMORAL COMPRESS: NORMAL
BH CV LOWER VASCULAR RIGHT COMMON FEMORAL PHASIC: NORMAL
BH CV LOWER VASCULAR RIGHT COMMON FEMORAL SPONT: NORMAL
BH CV LOWER VASCULAR RIGHT EXTERNAL ILIAC AUGMENT: NORMAL
BH CV LOWER VASCULAR RIGHT EXTERNAL ILIAC COMPRESS: NORMAL
BH CV LOWER VASCULAR RIGHT EXTERNAL ILIAC PHASIC: NORMAL
BH CV LOWER VASCULAR RIGHT EXTERNAL ILIAC SPONT: NORMAL
BH CV VAS PRELIMINARY FINDINGS SCRIPTING: 1
BUN SERPL-MCNC: 12 MG/DL (ref 8–23)
BUN/CREAT SERPL: 10.3 (ref 7–25)
CALCIUM SPEC-SCNC: 9.4 MG/DL (ref 8.6–10.5)
CHLORIDE SERPL-SCNC: 99 MMOL/L (ref 98–107)
CO2 SERPL-SCNC: 28.8 MMOL/L (ref 22–29)
CREAT SERPL-MCNC: 1.16 MG/DL (ref 0.76–1.27)
EGFRCR SERPLBLD CKD-EPI 2021: 66.9 ML/MIN/1.73
GLUCOSE SERPL-MCNC: 100 MG/DL (ref 65–99)
NT-PROBNP SERPL-MCNC: 920 PG/ML (ref 0–900)
POTASSIUM SERPL-SCNC: 4.5 MMOL/L (ref 3.5–5.2)
QT INTERVAL: 468 MS
QTC INTERVAL: 497 MS
SODIUM SERPL-SCNC: 139 MMOL/L (ref 136–145)

## 2023-08-29 PROCEDURE — 93971 EXTREMITY STUDY: CPT

## 2023-08-29 PROCEDURE — 83880 ASSAY OF NATRIURETIC PEPTIDE: CPT

## 2023-08-29 PROCEDURE — 80048 BASIC METABOLIC PNL TOTAL CA: CPT

## 2023-08-29 PROCEDURE — 93005 ELECTROCARDIOGRAM TRACING: CPT | Performed by: NURSE PRACTITIONER

## 2023-08-29 PROCEDURE — 36415 COLL VENOUS BLD VENIPUNCTURE: CPT

## 2023-08-29 RX ORDER — OMEPRAZOLE 40 MG/1
40 CAPSULE, DELAYED RELEASE ORAL DAILY
COMMUNITY

## 2023-08-29 RX ORDER — POTASSIUM CHLORIDE 20 MEQ/1
20 TABLET, EXTENDED RELEASE ORAL DAILY
COMMUNITY

## 2023-08-29 RX ORDER — FUROSEMIDE 40 MG/1
40 TABLET ORAL 2 TIMES DAILY
COMMUNITY

## 2023-08-30 ENCOUNTER — TELEPHONE (OUTPATIENT)
Dept: CARDIOLOGY | Facility: HOSPITAL | Age: 73
End: 2023-08-30
Payer: MEDICARE

## 2023-08-30 NOTE — TELEPHONE ENCOUNTER
Called patient with lab and venous duplex results. Labs stable and advised to continue lasix and potassium. Discussed venous duplex results with Dr. Arenas who recommends that he go ahead and treat with eliquis for 3 months and repeat venous duplex in 3 months. Discussed with patient. Sent in eliquis. Currently on DAPT. Advised to discuss triple therapy at upcoming appt with local cardiologist in 2 weeks. He had no further questions or concerns

## 2023-08-31 LAB
QT INTERVAL: 436 MS
QT INTERVAL: 450 MS
QTC INTERVAL: 477 MS
QTC INTERVAL: 482 MS

## 2023-09-08 NOTE — PROGRESS NOTES
[facility] Cardiothoracic Surgery Office Follow Up Note     Date of Encounter: 2023     Name: uDtch Arellano  : 1950     Referred By: No ref. provider found  PCP: Provider, No Known    Chief Complaint:    Chief Complaint   Patient presents with    Post-op     S/p CABG X 4/LAAL on 2023.  Patient states he is doing well.        Subjective      History of Present Illness:    Dutch Arellano is a 73 y.o. male former smoker current vape user s/p CABG x4 vessels with PO L per Dr. Louise on 2023.  PMH: Type 2 diabetes (hemoglobin A1c preop 7.1%), liposarcoma s/p right AKA, HTN, CAD s/p PCI and CABG, family history CAD.  Postoperatively, patient developed atrial fibrillation treated with amiodarone protocol.  Patient converted to sinus rhythm.  No anticoagulation per cardiology due to PO L closure w/ CABG. patient transferred to SNF on POD #6 (given his history of AKA).  He returns today for scheduled 4-week postop visit.  He has had no readmissions or ER visits.  Patient states compliance with Plavix, beta-blocker and aspirin therapy.  However, he experienced side effects w/ Lipitor.  He is scheduled to see Cardiology 9/15/23.      Patient was seen in Heart/Valve Center on 23.  Post op hypervolemia was noted and Rx for lasix/potassium.  A Venous Duplex exam was ordered due to edema and redness LLE:  superficial thrombophlebitis less than 3 cm from saphenofemoral junction.  Dr. Arenas recommended anticoagulation x 3 months and repeat exam.      Review of Systems:  Review of Systems   Constitutional: Positive for weight loss. Negative for chills, decreased appetite, diaphoresis, fever, malaise/fatigue, night sweats and weight gain.   HENT: Negative.  Negative for hoarse voice.    Eyes: Negative.  Negative for blurred vision, double vision and visual disturbance.   Cardiovascular:  Positive for chest pain (left of incision). Negative for claudication, dyspnea on exertion, irregular  heartbeat, leg swelling, near-syncope, orthopnea, palpitations, paroxysmal nocturnal dyspnea and syncope.   Respiratory:  Positive for cough. Negative for hemoptysis, shortness of breath, sputum production and wheezing.    Hematologic/Lymphatic: Negative for adenopathy and bleeding problem. Does not bruise/bleed easily.   Skin:  Negative for color change, nail changes, poor wound healing and rash.   Musculoskeletal:  Positive for arthritis, gout (left great toe) and myalgias. Negative for back pain, falls and muscle cramps.   Gastrointestinal:  Positive for constipation. Negative for abdominal pain, dysphagia and heartburn.   Genitourinary: Negative.  Negative for flank pain.   Neurological: Negative.  Negative for brief paralysis, disturbances in coordination, dizziness, focal weakness, headaches, light-headedness, loss of balance, numbness, paresthesias, sensory change, vertigo and weakness.   Psychiatric/Behavioral:  Negative for depression and suicidal ideas. The patient has insomnia (difficulty sleeping on his back).    Allergic/Immunologic: Negative for persistent infections.     I have reviewed the following portions of the patient's history: problem list, current medications, allergies, past surgical history, past medical history, past social history, past family history, and ROS and confirm it's accurate.    Allergies:  No Known Allergies    Medications:      Current Outpatient Medications:     amiodarone (PACERONE) 200 MG tablet, Take 1 tablet by mouth 2 (Two) Times a Day for 7 days, THEN 1 tablet Daily for 30 days., Disp: 44 tablet, Rfl: 0    Apixaban Starter Pack tablet therapy pack, Take two 5 mg tablets by mouth every 12 hours for 7 days. Followed by one 5 mg tablet every 12 hours. (Dispense starter pack if available), Disp: 74 tablet, Rfl: 0    aspirin 81 MG EC tablet, Take 1 tablet by mouth Daily., Disp: , Rfl:     carvedilol (COREG) 25 MG tablet, Take 0.5 tablets by mouth 2 (Two) Times a Day With  Meals., Disp: 30 tablet, Rfl: 5    clopidogrel (PLAVIX) 75 MG tablet, Take 1 tablet by mouth Daily., Disp: 30 tablet, Rfl: 5    ferrous sulfate 325 (65 FE) MG tablet, Take 1 tablet by mouth Daily With Breakfast., Disp: , Rfl:     furosemide (LASIX) 40 MG tablet, Take 1 tablet by mouth 2 (Two) Times a Day., Disp: , Rfl:     gabapentin (NEURONTIN) 600 MG tablet, Take 1 tablet by mouth 2 (Two) Times a Day., Disp: , Rfl:     nitroglycerin (NITROSTAT) 0.4 MG SL tablet, Place 1 tablet under the tongue Every 5 (Five) Minutes As Needed for Chest Pain (Systolic BP Greater Than 100). Take no more than 3 doses in 15 minutes., Disp: 25 tablet, Rfl: 12    omeprazole (priLOSEC) 40 MG capsule, Take 1 capsule by mouth Daily., Disp: , Rfl:     potassium chloride (K-DUR,KLOR-CON) 20 MEQ CR tablet, Take 1 tablet by mouth Daily., Disp: , Rfl:     allopurinol (ZYLOPRIM) 100 MG tablet, Take 1 tablet by mouth Daily. (Patient not taking: Reported on 9/11/2023), Disp: , Rfl:     Omega-3 Fatty Acids (fish oil) 1000 MG capsule capsule, Take 2 capsules by mouth 2 (Two) Times a Day With Meals. (Patient not taking: Reported on 9/11/2023), Disp: , Rfl:     History:   Past Medical History:   Diagnosis Date    Coronary artery disease     Diabetes mellitus     Former smoker 08/11/2023    HTN (hypertension) 08/11/2023    Hypertension     Vapes nicotine containing substance 08/11/2023       Past Surgical History:   Procedure Laterality Date    CORONARY ARTERY BYPASS GRAFT N/A 08/11/2023    Procedure: MEDIAN STERNOTOMY, CORONARY ARTERY BYPASS GRAFTING X 4 UTILIZING LEFT INTERNAL MAMMARY ARTERY, ENDOSCOPIC VEIN HARVEST OF LEFT GREATER SAPHENOUS VEIN, LEFT ATRIAL APPENDAGE LIGATION, TRANSESOPHAGEAL ECHOCARDIOGRAM PER ANESTHESIA;  Surgeon: Spenser Louise MD;  Location: [facility];  Service: Cardiothoracic;  Laterality: N/A;    CORONARY STENT PLACEMENT      FINGER SURGERY Right     LEFT HEART CATH      LEG AMPUTATION Right        Social History  "    Socioeconomic History    Marital status:    Tobacco Use    Smoking status: Never    Smokeless tobacco: Never   Vaping Use    Vaping Use: Never used   Substance and Sexual Activity    Alcohol use: Never    Drug use: Never    Sexual activity: Defer        Family History   Problem Relation Age of Onset    No Known Problems Mother     Heart disease Father     Cancer Sister     Cancer Brother     No Known Problems Maternal Grandmother     No Known Problems Maternal Grandfather     No Known Problems Paternal Grandmother     No Known Problems Paternal Grandfather        Objective   Physical Exam:  Vitals:    09/11/23 1052   BP: 140/78   BP Location: Left arm   Patient Position: Sitting   Pulse: 80   Temp: 96.9 °F (36.1 °C)   SpO2: 95%   Weight: 93.6 kg (206 lb 6.4 oz)   Height: 180.3 cm (70.98\")  Comment: pt reported      Body mass index is 28.8 kg/m².    Physical Exam  Vitals reviewed.   Constitutional:       General: He is not in acute distress.     Appearance: He is not toxic-appearing.   HENT:      Head: Normocephalic and atraumatic.   Eyes:      General: Lids are normal.      Conjunctiva/sclera: Conjunctivae normal.      Pupils: Pupils are equal, round, and reactive to light.   Cardiovascular:      Rate and Rhythm: Normal rate and regular rhythm.      Pulses:           Dorsalis pedis pulses are 2+ on the left side. Right dorsalis pedis pulse not accessible.        Posterior tibial pulses are 2+ on the left side. Right posterior tibial pulse not accessible.      Heart sounds: S1 normal and S2 normal. No murmur heard.     Comments: Trace LLE edema @ lower leg.  Two open vein harvest sites w/ staples.  These were removed without dehiscence.  Scant bleeding (patient on Eliquis/asa/plavix).  Covered w/ bordered gauze.  Mild erythema adjacent to vein harvest sties without warmth, fluctuance.    Pulmonary:      Effort: Pulmonary effort is normal. No respiratory distress.      Breath sounds: Normal breath sounds. "   Chest:      Comments: Sternotomy incision well approximated without dehiscence, erythema, tenderness or induration.  MT sites healing well.  Suture removed from left lateral MT site.  Musculoskeletal:         General: Normal range of motion.      Cervical back: Normal range of motion and neck supple.      Left lower leg: Edema present.      Right Lower Extremity: Right leg is amputated above knee.   Feet:      Comments: RLE prosthesis and cane in use  Skin:     General: Skin is warm and dry.      Capillary Refill: Capillary refill takes less than 2 seconds.   Neurological:      General: No focal deficit present.      Mental Status: He is alert and oriented to person, place, and time.   Psychiatric:         Attention and Perception: Attention normal.         Mood and Affect: Mood normal.         Speech: Speech normal.         Behavior: Behavior normal. Behavior is cooperative.       Imaging/Labs:  CXR 9/11/23:  Personally reviewed  No PTX.  Sternotomy wires intact.  Cardiac silhouette appears WNL.  No significant pleural effusions or atelectasis.  Radiology review pending.      XR Chest 1 View    Result Date: 8/17/2023  Impression: No significant change to the bilateral subsegmental atelectasis without new disease in the chest. Electronically Signed: Bryan Silva MD  8/17/2023 9:59 AM EDT  Workstation ID: [id number]    XR Chest 1 View: 8/16/2023 (personally reviewed)  Impression: Mild left basilar atelectasis. Improved right lung atelectasis. Electronically Signed: Mercy Toth MD  8/16/2023 8:16 AM EDT  Workstation ID: [id number]      Duplex Venous Lower Extremity - Left CAR 8/29/23  Interpretation Summary    Left lower extremity superficial thrombophlebitis noted in the great saphenous (above knee) of uncertain chronicity. The finding can be considered equivalent to a deep vein thrombosis since it is less than 3 cm from the saphenofemoral junction.    All other left sided veins appeared negative for evidence of a  DVT and SVT in the areas visualized.  The left sided GSV below the knee was previously excised for prior bypass surgery.    Incidentally noted chronic appearing, nonocclusive right lower extremity deep vein thrombosis noted in the common femoral.      Assessment / Plan      Assessment / Plan:  1. Coronary artery disease s/p CABG 8/11/23  - 73 y.o. male former smoker current vape user s/p CABG x4 vessels with LAAL per Dr. Louise on 8/11/2023.    - PMH: Type 2 diabetes (hemoglobin A1c preop 7.1%), liposarcoma s/p right AKA, HTN, CAD s/p PCI and CABG, family history CAD.    - Patient developed post op atrial fibrillation treated w/ amiodarone protocol.  Patient converted to sinus rhythm prior to DC.  No anticoagulation per cardiology due to LAAL closure w/ CABG.   - Transferred to SNF on POD #6 (given his history of AKA).    - No readmissions or ER visits.    - States compliance with Plavix, beta-blocker and aspirin therapy.  However, he experienced side effects w/ Lipitor.   Advised discuss statin therapy with Cardiology @ follow up 9/15/23.   - Seen in [facility]ve Center on 8/29/23.  Post op hypervolemia treated w/ lasix/potassium.  Much improved  - Venous Duplex 8/29/23 due to edema and redness LLE:  superficial thrombophlebitis less than 3 cm from saphenofemoral junction.  Cardiology recommended anticoagulation x 3 months and repeat exam.  Patient to follow up with Dr. Diaz 9/15/23 to plan repeat study.  - Staples removed from LLE open vein harvest site.  Now healing well  - Sternum healing well w/o signs of infection or instability  - CXR free from pleural effusion or atelectasis  - Plans to participate in [facility] beginning in October  - Reviewed sternal precautions as outlined below in detail.  Patient verbalized understanding.    - Follow up in [facility] is PRN      Patient Education:  You may resume driving at 6 weeks from your surgery date.  Please plan to stop every 2 hours to  ambulate if driving or flying long distances.  No lifting over 10 lbs for 12 weeks (3 months).  After this time you can slowly increase your weight as tolerated.  You should not partake in any overhead activities or movements that involve twisting or jarring of your upper chest and torso such as (but not limited to) -- golfing, tennis, lawn mowing including zero turn mowers, use of lawn trimmers or edgers, shoveling, painting, vacuuming, mopping, sweeping, shooting a gun, etc for three months  Continue to keep your incisions clean and dry.  Use plain antibacterial soap (i.e. dial) and water to clean and pat dry.    Do no apply any lotions, creams, oils, powders, salves, or ointments directly to incisional sites.  Please watch for signs and symptoms of infection which may include but are not limited to: increased pain or tenderness around your incision, warmth at the site or fever, redness or red streaking, and foul smelling or appearing drainage.   If your incision opens up please contact our office.       Follow Up:   Return PRN @ request of Cardiology.   Or sooner for any further concerns or worsening sign and symptoms. If unable to reach us in the office please dial 911 or go to the nearest emergency department.      JANIE Danielle  [facility] Cardiothoracic Surgery    Time Spent: I spent 28 minutes caring for Dutch on this date of service. This time includes time spent by me in the following activities: preparing for the visit, reviewing tests, obtaining and/or reviewing a separately obtained history, performing a medically appropriate examination and/or evaluation, counseling and educating the patient/family/caregiver, documenting information in the medical record, independently interpreting results and communicating that information with the patient/family/caregiver, and care coordination.

## 2023-09-11 ENCOUNTER — OFFICE VISIT (OUTPATIENT)
Dept: CARDIAC SURGERY | Facility: CLINIC | Age: 73
End: 2023-09-11
Payer: MEDICARE

## 2023-09-11 VITALS
OXYGEN SATURATION: 95 % | SYSTOLIC BLOOD PRESSURE: 140 MMHG | DIASTOLIC BLOOD PRESSURE: 78 MMHG | WEIGHT: 206.4 LBS | TEMPERATURE: 96.9 F | BODY MASS INDEX: 28.9 KG/M2 | HEIGHT: 71 IN | HEART RATE: 80 BPM

## 2023-09-11 DIAGNOSIS — I25.10 CORONARY ARTERY DISEASE INVOLVING NATIVE CORONARY ARTERY OF NATIVE HEART WITHOUT ANGINA PECTORIS: Primary | ICD-10-CM

## 2023-09-11 PROCEDURE — 3078F DIAST BP <80 MM HG: CPT | Performed by: NURSE PRACTITIONER

## 2023-09-11 PROCEDURE — 1159F MED LIST DOCD IN RCRD: CPT | Performed by: NURSE PRACTITIONER

## 2023-09-11 PROCEDURE — 1160F RVW MEDS BY RX/DR IN RCRD: CPT | Performed by: NURSE PRACTITIONER

## 2023-09-11 PROCEDURE — 3077F SYST BP >= 140 MM HG: CPT | Performed by: NURSE PRACTITIONER

## 2023-09-11 PROCEDURE — 99024 POSTOP FOLLOW-UP VISIT: CPT | Performed by: NURSE PRACTITIONER

## 2023-09-15 ENCOUNTER — OFFICE VISIT (OUTPATIENT)
Dept: CARDIOLOGY | Facility: CLINIC | Age: 73
End: 2023-09-15
Payer: MEDICARE

## 2023-09-15 VITALS
OXYGEN SATURATION: 91 % | HEART RATE: 72 BPM | HEIGHT: 71 IN | DIASTOLIC BLOOD PRESSURE: 63 MMHG | SYSTOLIC BLOOD PRESSURE: 116 MMHG | BODY MASS INDEX: 29.18 KG/M2 | WEIGHT: 208.4 LBS

## 2023-09-15 DIAGNOSIS — E11.9 TYPE 2 DIABETES MELLITUS WITHOUT COMPLICATION, WITHOUT LONG-TERM CURRENT USE OF INSULIN: ICD-10-CM

## 2023-09-15 DIAGNOSIS — I25.10 CORONARY ARTERY DISEASE INVOLVING NATIVE CORONARY ARTERY OF NATIVE HEART WITHOUT ANGINA PECTORIS: Primary | ICD-10-CM

## 2023-09-15 DIAGNOSIS — Z87.891 FORMER SMOKER: ICD-10-CM

## 2023-09-15 DIAGNOSIS — Z89.611 RIGHT ABOVE-KNEE AMPUTEE: ICD-10-CM

## 2023-09-15 DIAGNOSIS — I25.118 CORONARY ARTERY DISEASE OF NATIVE ARTERY OF NATIVE HEART WITH STABLE ANGINA PECTORIS: ICD-10-CM

## 2023-09-15 DIAGNOSIS — I10 PRIMARY HYPERTENSION: Chronic | ICD-10-CM

## 2023-09-15 DIAGNOSIS — I48.0 PAROXYSMAL ATRIAL FIBRILLATION: ICD-10-CM

## 2023-09-15 DIAGNOSIS — E78.5 DYSLIPIDEMIA: ICD-10-CM

## 2023-09-15 PROBLEM — Z72.0 VAPES NICOTINE CONTAINING SUBSTANCE: Chronic | Status: RESOLVED | Noted: 2023-08-11 | Resolved: 2023-09-15

## 2023-09-15 RX ORDER — ICOSAPENT ETHYL 1000 MG/1
2 CAPSULE ORAL 2 TIMES DAILY WITH MEALS
Qty: 180 CAPSULE | Refills: 1 | Status: SHIPPED | OUTPATIENT
Start: 2023-09-15

## 2023-09-15 RX ORDER — FUROSEMIDE 40 MG/1
40 TABLET ORAL 2 TIMES DAILY
Qty: 45 TABLET | Refills: 1 | Status: SHIPPED | OUTPATIENT
Start: 2023-09-15

## 2023-09-15 RX ORDER — POTASSIUM CHLORIDE 20 MEQ/1
20 TABLET, EXTENDED RELEASE ORAL DAILY
Qty: 45 TABLET | Refills: 1 | Status: SHIPPED | OUTPATIENT
Start: 2023-09-15

## 2023-09-15 NOTE — PROGRESS NOTES
Subjective   Follow up, post CABG *4 , PAF  Dutch Arellano is a 73 y.o. male who presents to day for Follow-up (Here for f/u s/p CABG per Dr. Louise), Coronary Artery Disease, and Hypertension.    CHIEF COMPLIANT  Chief Complaint   Patient presents with    Follow-up     Here for f/u s/p CABG per Dr. Louise    Coronary Artery Disease    Hypertension     Problem List:  CAD  1.1 CABG x4, UTILIZING LEFT INTERNAL MAMMARY ARTERY, ENDOSCOPIC VEIN HARVEST OF LEFT GREATER SAPHENOUS VEIN, LEFT ATRIAL APPENDAGE LIGATION, TRANSESOPHAGEAL ECHOCARDIOGRAM PER ANESTHESIA per Dr. Louise on 8-  2. New onset post CABG a-fib (LEFT ATRIAL APPENDAGE DONE AT TIME OF CABG)  3. HTN  4. Former smoker      Problem List Items Addressed This Visit          Cardiac and Vasculature    HTN (hypertension) (Chronic)    CAD s/p CABG 8/11/2023 L AA L closure - Primary    Overview     Added automatically from request for surgery [id number]         MV CAD    Overview     S/P multiple stents         Paroxysmal atrial fibrillation    Dyslipidemia       Endocrine and Metabolic    Type 2 diabetes mellitus without complication, without long-term current use of insulin       Musculoskeletal and Injuries    Right above-knee amputee       Tobacco    Former smoker       HPI    He underwent CABG x4 which was complicated with postoperative atrial fibrillation he was started on amiodarone he has left atrial appendage patient and this was confirmed by [facility] since the surgery he is a little bit slow for rehab because of the problem with the amputation he has to use his arms a little bit but he is going through the rehab he still having some pain around his scar but otherwise no significant shortness of breath edema he occasionally gets palpitations but usually short-lived                    PRIOR MEDS  Current Outpatient Medications on File Prior to Visit   Medication Sig Dispense Refill    amiodarone (PACERONE) 200 MG tablet Take 1 tablet by mouth 2 (Two)  Times a Day for 7 days, THEN 1 tablet Daily for 30 days. (Patient taking differently: Takes 200 mg po daily) 44 tablet 0    Apixaban Starter Pack tablet therapy pack Take two 5 mg tablets by mouth every 12 hours for 7 days. Followed by one 5 mg tablet every 12 hours. (Dispense starter pack if available) (Patient taking differently: Take two 5 mg tablets by mouth every 12 hours for 7 days. Followed by one 5 mg tablet every 12 hours. (Dispense starter pack if available)) 74 tablet 0    aspirin 81 MG EC tablet Take 1 tablet by mouth Daily.      carvedilol (COREG) 25 MG tablet Take 0.5 tablets by mouth 2 (Two) Times a Day With Meals. 30 tablet 5    clopidogrel (PLAVIX) 75 MG tablet Take 1 tablet by mouth Daily. 30 tablet 5    ferrous sulfate 325 (65 FE) MG tablet Take 1 tablet by mouth Daily With Breakfast.      furosemide (LASIX) 40 MG tablet Take 1 tablet by mouth 2 (Two) Times a Day.      gabapentin (NEURONTIN) 600 MG tablet Take 1 tablet by mouth 2 (Two) Times a Day.      omeprazole (priLOSEC) 40 MG capsule Take 1 capsule by mouth Daily.      potassium chloride (K-DUR,KLOR-CON) 20 MEQ CR tablet Take 1 tablet by mouth Daily.      nitroglycerin (NITROSTAT) 0.4 MG SL tablet Place 1 tablet under the tongue Every 5 (Five) Minutes As Needed for Chest Pain (Systolic BP Greater Than 100). Take no more than 3 doses in 15 minutes. (Patient not taking: Reported on 9/15/2023) 25 tablet 12    [DISCONTINUED] allopurinol (ZYLOPRIM) 100 MG tablet Take 1 tablet by mouth Daily.      [DISCONTINUED] Omega-3 Fatty Acids (fish oil) 1000 MG capsule capsule Take 2 capsules by mouth 2 (Two) Times a Day With Meals.       No current facility-administered medications on file prior to visit.       ALLERGIES  Crestor [rosuvastatin], Lipitor [atorvastatin], Other, Pravastatin, and Repatha [evolocumab]    HISTORY  Past Medical History:   Diagnosis Date    Coronary artery disease     Diabetes mellitus     Former smoker 08/11/2023    HTN  "(hypertension) 08/11/2023    Hypertension     Vapes nicotine containing substance 08/11/2023       Social History     Socioeconomic History    Marital status:    Tobacco Use    Smoking status: Never    Smokeless tobacco: Never   Vaping Use    Vaping Use: Never used   Substance and Sexual Activity    Alcohol use: Never    Drug use: Never    Sexual activity: Defer       Family History   Problem Relation Age of Onset    No Known Problems Mother     Heart disease Father     Cancer Sister     Cancer Brother     No Known Problems Maternal Grandmother     No Known Problems Maternal Grandfather     No Known Problems Paternal Grandmother     No Known Problems Paternal Grandfather        Review of Systems   Constitutional: Negative.    HENT:  Positive for hearing loss.    Eyes:  Positive for visual disturbance (glasses prn).   Respiratory: Negative.     Cardiovascular:  Positive for palpitations and leg swelling (LLE). Negative for chest pain.   Gastrointestinal: Negative.    Endocrine: Negative.    Genitourinary: Negative.    Musculoskeletal:  Positive for arthralgias, gait problem (ambulates with cane) and myalgias.   Skin: Negative.    Allergic/Immunologic: Negative.    Neurological:  Negative for dizziness, syncope and light-headedness.   Hematological:  Bruises/bleeds easily.   Psychiatric/Behavioral: Negative.       Objective     VITALS: /63 (BP Location: Left arm, Patient Position: Sitting)   Pulse 72   Ht 180.3 cm (70.98\")   Wt 94.5 kg (208 lb 6.4 oz)   SpO2 91%   BMI 29.08 kg/m²     LABS:   Lab Results (most recent)       None            IMAGING:   XR Chest 2 View    Result Date: 9/11/2023  Impression: Minor residual left midlung and basilar scarring or atelectasis. Electronically Signed: Satnam Dobson MD  9/11/2023 4:01 PM EDT  Workstation ID: [id number]    XR Chest 1 View    Result Date: 8/17/2023  Impression: No significant change to the bilateral subsegmental atelectasis without new disease in the " chest. Electronically Signed: Bryan Silva MD  8/17/2023 9:59 AM EDT  Workstation ID: [id number]    XR Chest 1 View    Result Date: 8/16/2023  Impression: Mild left basilar atelectasis. Improved right lung atelectasis. Electronically Signed: Mercy Toth MD  8/16/2023 8:16 AM EDT  Workstation ID: [id number]    XR Chest 1 View    Result Date: 8/15/2023  Impression: No discrete pneumothorax after removal of lines/tubes with similar scattered subsegmental atelectasis and no new disease in the chest. Electronically Signed: Bryan Silva MD  8/15/2023 10:00 AM EDT  Workstation ID: [id number]    XR Chest 1 View    Result Date: 8/13/2023  Impression: 1.Postoperative changes are noted. The left chest tube and mediastinal drains are stable in position. 2.Mild atelectasis within the lung bases. 3.Stable positioning of the right IJ venous sheath and central line. Electronically Signed: Pako Ojeda  8/13/2023 8:32 AM EDT  Workstation ID: [id number]    XR Chest 1 View    Result Date: 8/12/2023  Impression: Postsurgical chest with similar trace bilateral pleural effusions and bibasilar atelectasis after extubation. Electronically Signed: Bryan Silva MD  8/12/2023 8:10 AM EDT  Workstation ID: [id number]    XR Chest 1 View    Result Date: 8/11/2023  Impression: Postoperative changes with various tubes and catheters in place as noted. Mild bibasilar atelectasis, greatest on the left. Electronically Signed: Mercy Toth MD  8/11/2023 12:03 PM EDT  Workstation ID: [id number]    XR Chest 1 View    Result Date: 8/10/2023  Impression: No acute process. Electronically Signed: Shyam Dominguez  8/10/2023 7:42 AM EDT  Workstation ID: [id number]    XR Lost Needle / Instrument    Result Date: 8/11/2023  Impression: Postsurgical changes of CABG. No unexplained or unexpected foreign body or medical device. Electronically Signed: Yuniel Machuca  8/11/2023 11:07 AM EDT  Workstation ID: [id number]      EXAM:  Physical Exam  Constitutional:        Appearance: He is well-developed.   HENT:      Head: Normocephalic and atraumatic.   Eyes:      Pupils: Pupils are equal, round, and reactive to light.   Neck:      Thyroid: No thyromegaly.      Vascular: No JVD.   Cardiovascular:      Rate and Rhythm: Normal rate and regular rhythm.      Chest Wall: PMI is not displaced.      Pulses: Normal pulses.      Heart sounds: Normal heart sounds, S1 normal and S2 normal. No murmur heard.    No friction rub. No gallop.      Comments: Healed thoracotomy scar  Pulmonary:      Effort: Pulmonary effort is normal. No respiratory distress.      Breath sounds: Normal breath sounds. No stridor. No wheezing or rales.   Chest:      Chest wall: No tenderness.   Abdominal:      General: Bowel sounds are normal. There is no distension.      Palpations: Abdomen is soft. There is no mass.      Tenderness: There is no abdominal tenderness. There is no guarding or rebound.   Musculoskeletal:      Cervical back: Neck supple. No edema.   Skin:     General: Skin is warm and dry.      Coloration: Skin is not pale.      Findings: No erythema or rash.      Comments: Right AKA   Neurological:      Mental Status: He is alert and oriented to person, place, and time.      Cranial Nerves: No cranial nerve deficit.      Coordination: Coordination normal.   Psychiatric:         Behavior: Behavior normal.       Procedure   Procedures       Assessment & Plan     Diagnoses and all orders for this visit:    1. Coronary artery disease involving native coronary artery of native heart without angina pectoris (Primary)    2. Primary hypertension    3. Coronary artery disease of native artery of native heart with stable angina pectoris    4. Former smoker    5. Type 2 diabetes mellitus without complication, without long-term current use of insulin    6. Paroxysmal atrial fibrillation    7. Right above-knee amputee    8. Dyslipidemia    1.  S/p CABG he is recovering well he is continue with the rehab  2.  He had  left atrial appendage ligation with a [facility] confirming the adequacy so he is not anticoagulated currently he is normal sinus rhythm I am going to continue the amiodarone for total of 3 months after surgery and then we will try to reassess and if he is maintaining sinus rhythm possibly can discontinue the medication  3.  His blood pressure is well controlled we will continue current management 4.  I reviewed his lipid profile from August 10 and that is elevated triglycerides LDL and low HDL we talked about that he needs to get better diabetes school control he was intolerant to statin and he was taking PCSK9 inhibitor but for some reason this was discontinued I am going to start him again on leqvio  4.  I will strongly advised him to talk to his primary care physician for better lipid control I am also going to start him on Vascepa as his triglycerides were significantly elevated to 474  5.  He quit his smoking last month advised him never to go smoke again  6.  Has a history of liposarcoma of the right lower extremity      No follow-ups on file.      Advance Care Planning   ACP discussion was held with the patient during this visit. Patient does not have an advance directive, declines further assistance.             MEDS ORDERED DURING VISIT:  No orders of the defined types were placed in this encounter.      As always, Provider, No Known  I appreciate very much the opportunity to participate in the cardiovascular care of your patients. Please do not hesitate to call me with any questions with regards to Dutch PAYNE Adan evaluation and management.         This document has been electronically signed by Ilir Diaz MD  September 15, 2023 11:53 EDT    This note is dictated utilizing voice recognition software.

## 2023-09-18 ENCOUNTER — TELEPHONE (OUTPATIENT)
Dept: PHARMACY | Facility: HOSPITAL | Age: 73
End: 2023-09-18
Payer: MEDICARE

## 2023-09-18 NOTE — TELEPHONE ENCOUNTER
Filled out Leqvio full support service application to see if Leqvio would be approved through the patient's medical or pharmacy benefit. Patient does not have part d coverage; therefore, it is not covered through pharmacy benefit. Leqvio is covered through part a & b but has a 20% out of pocket. [facility] service center was unable to provide benefits for [facility] coverage. [facility]  will need to perform a cost benefits and get auth approval to schedule administration with their facility. [facility].

## 2024-01-25 ENCOUNTER — TELEPHONE (OUTPATIENT)
Dept: CARDIOLOGY | Facility: CLINIC | Age: 74
End: 2024-01-25
Payer: MEDICARE

## 2024-01-25 NOTE — TELEPHONE ENCOUNTER
Called [phone] and advised patient of BW due before his FU in Feb. [facility] [place].

## (undated) DEVICE — PROXIMATE RH ROTATING HEAD SKIN STAPLERS (35 WIDE) CONTAINS 35 STAINLESS STEEL STAPLES: Brand: PROXIMATE

## (undated) DEVICE — TOWEL,OR,DSP,ST,BLUE,STD,4/PK,20PK/CS: Brand: MEDLINE

## (undated) DEVICE — GLV SURG BIOGEL LTX PF 7 1/2

## (undated) DEVICE — FLTR RESERV PERFUS INTERSEPT 02 STRL

## (undated) DEVICE — BLD SCLPL BEAVR MINI STR 2BVL 180D LF

## (undated) DEVICE — Device: Brand: PERFECTCUT ROTATING AORTIC PUNCH

## (undated) DEVICE — NDL PERC 1PRT THNWALL W/BASEPLT 18G 7CM

## (undated) DEVICE — EZ GLIDE AORTIC CANNULA: Brand: EDWARDS LIFESCIENCES EZ GLIDE AORTIC CANNULA

## (undated) DEVICE — SUT MONOCRYL PLS ANTIB UND 3/0  PS1 27IN

## (undated) DEVICE — AVID DUAL STAGE VENOUS DRAINAGE CANNULA: Brand: AVID DUAL STAGE VENOUS DRAINAGE CANNULA

## (undated) DEVICE — PK ATS CUST W CARDIOTOMY RESEVOIR

## (undated) DEVICE — 3M™ MEDIPORE™ H SOFT CLOTH SURGICAL TAPE, 2863, 3 IN X 10 YD, 12/CASE: Brand: 3M™ MEDIPORE™

## (undated) DEVICE — CVR PROB ULTRASND/TRANSD W/GEL LNG 18X250CM STRL

## (undated) DEVICE — SUT PROLN 4/0 RB1 D/A 36IN 8557H

## (undated) DEVICE — TBG SXN INTRACARD RIDGID FLUT 24F .25X13IN A/

## (undated) DEVICE — TBG SXN RIGD MINI/SUCKER 9F 4.75IN

## (undated) DEVICE — PENCL ROCKRSWCH MEGADYNE W/HOLSTR 10FT SS

## (undated) DEVICE — CLTH CLENS READYCLEANSE PERI CARE PK/5

## (undated) DEVICE — PK HEART OPN 10

## (undated) DEVICE — SUT SILK 4/0 TIES 18IN A183H

## (undated) DEVICE — CONNECT Y INTERSEPT W/LL 3/8 X 3/8 X 3/8IN

## (undated) DEVICE — SYS VASOVIEW HEMOPRO ENDOSCOPIC HARVST VESL

## (undated) DEVICE — SUT PROLN 3/0 SH D/A 36IN 8522H

## (undated) DEVICE — SUT SILK 0/0 CT2 18IN C027D

## (undated) DEVICE — ANTIBACTERIAL UNDYED BRAIDED (POLYGLACTIN 910), SYNTHETIC ABSORBABLE SUTURE: Brand: COATED VICRYL

## (undated) DEVICE — STERILE PVP: Brand: MEDLINE INDUSTRIES, INC.

## (undated) DEVICE — PK PERFUS CUST W/CARDIOPLEGIA

## (undated) DEVICE — SENSR CERBRL O2 PK/2

## (undated) DEVICE — SUT PROLN 7/0 CV BV1 24IN 8304H BX/36

## (undated) DEVICE — TRAP FLD MINIVAC MEGADYNE 100ML

## (undated) DEVICE — CATHETER,URETHRAL,REDRUBBER,STERILE,22FR: Brand: MEDLINE

## (undated) DEVICE — Device

## (undated) DEVICE — TBG PENCL TELESCP MEGADYNE SMOKE EVAC 10FT

## (undated) DEVICE — LEVEL SENSORS PADS ARE USED TO ATTACH THE LEVEL SENSORS TO A HARD SHELL RESERVOIR. INCLUDES COUPLING GEL.: Brand: TERUMO® ADVANCED PERFUSION SYSTEM 1

## (undated) DEVICE — GUIDE SELECT ATRICLIP

## (undated) DEVICE — SUCTION CANISTER 2500CC: Brand: DEROYAL

## (undated) DEVICE — SUT PDS 1 CTX 36IN VIO PDP371T

## (undated) DEVICE — OASIS DRAIN, SINGLE, INLINE & ATS COMPATIBLE: Brand: OASIS

## (undated) DEVICE — GLV SURG BIOGEL LTX PF 8

## (undated) DEVICE — ADAPT/Y PERFUS DLP FML/LUER COLR/CODE/CLMP 8.9AND25.4CM

## (undated) DEVICE — AVANTI + 4F STD W/GW: Brand: AVANTI

## (undated) DEVICE — TUBING, SUCTION, 1/4" X 10', STRAIGHT: Brand: MEDLINE

## (undated) DEVICE — SUT PROLN 6/0 C1 D/A 30IN 8706H

## (undated) DEVICE — CANN AORT ROOT DLP VNT 14G 7F

## (undated) DEVICE — PAD ARMBRD SURG CONVOL 7.5X20X2IN

## (undated) DEVICE — ELECTRD BLD EZ CLN STD 2.5IN

## (undated) DEVICE — CANN VESL DLP 1WY BLNT/TP 3MM

## (undated) DEVICE — SUT PROLN 4/0 SH D/A 36IN 8521H

## (undated) DEVICE — PATIENT RETURN ELECTRODE, SINGLE-USE, CONTACT QUALITY MONITORING, ADULT, WITH 9FT CORD, FOR PATIENTS WEIGING OVER 33LBS. (15KG): Brand: MEGADYNE

## (undated) DEVICE — SUT SILK 2 SUTUPAK TIE 60IN SA8H 2STRAND

## (undated) DEVICE — 12 FOOT DISPOSABLE EXTENSION CABLE WITH SAFE CONNECT / SCREW-DOWN